# Patient Record
Sex: FEMALE | Race: WHITE | NOT HISPANIC OR LATINO | Employment: UNEMPLOYED | RURAL
[De-identification: names, ages, dates, MRNs, and addresses within clinical notes are randomized per-mention and may not be internally consistent; named-entity substitution may affect disease eponyms.]

---

## 2017-02-16 ENCOUNTER — HISTORICAL (OUTPATIENT)
Dept: ADMINISTRATIVE | Facility: HOSPITAL | Age: 52
End: 2017-02-16

## 2017-02-21 LAB
LAB AP CLINICAL INFORMATION: NORMAL
LAB AP GENERAL CAT - HISTORICAL: NORMAL
LAB AP INTERPRETATION/RESULT - HISTORICAL: NEGATIVE
LAB AP SPECIMEN ADEQUACY - HISTORICAL: NORMAL
LAB AP SPECIMEN SUBMITTED - HISTORICAL: NORMAL

## 2019-05-16 ENCOUNTER — HISTORICAL (OUTPATIENT)
Dept: ADMINISTRATIVE | Facility: HOSPITAL | Age: 54
End: 2019-05-16

## 2019-05-21 LAB
LAB AP CLINICAL INFORMATION: NORMAL
LAB AP COMMENTS: NORMAL
LAB AP GENERAL CAT - HISTORICAL: NORMAL
LAB AP INTERPRETATION/RESULT - HISTORICAL: NEGATIVE
LAB AP SPECIMEN ADEQUACY - HISTORICAL: NORMAL
LAB AP SPECIMEN SUBMITTED - HISTORICAL: NORMAL

## 2020-10-09 ENCOUNTER — HISTORICAL (OUTPATIENT)
Dept: ADMINISTRATIVE | Facility: HOSPITAL | Age: 55
End: 2020-10-09

## 2020-10-10 LAB
FLUAV AG UPPER RESP QL IA.RAPID: NEGATIVE
FLUBV AG UPPER RESP QL IA.RAPID: NEGATIVE
SARS-COV+SARS-COV-2 AG RESP QL IA.RAPID: NEGATIVE

## 2020-11-30 ENCOUNTER — HISTORICAL (OUTPATIENT)
Dept: ADMINISTRATIVE | Facility: HOSPITAL | Age: 55
End: 2020-11-30

## 2020-12-14 ENCOUNTER — HISTORICAL (OUTPATIENT)
Dept: ADMINISTRATIVE | Facility: HOSPITAL | Age: 55
End: 2020-12-14

## 2020-12-21 ENCOUNTER — HISTORICAL (OUTPATIENT)
Dept: ADMINISTRATIVE | Facility: HOSPITAL | Age: 55
End: 2020-12-21

## 2021-01-06 ENCOUNTER — HISTORICAL (OUTPATIENT)
Dept: ADMINISTRATIVE | Facility: HOSPITAL | Age: 56
End: 2021-01-06

## 2021-01-21 ENCOUNTER — HISTORICAL (OUTPATIENT)
Dept: ADMINISTRATIVE | Facility: HOSPITAL | Age: 56
End: 2021-01-21

## 2021-01-21 LAB — GLUCOSE SERPL-MCNC: 95 MG/DL (ref 70–105)

## 2021-05-12 ENCOUNTER — HISTORICAL (OUTPATIENT)
Dept: ADMINISTRATIVE | Facility: HOSPITAL | Age: 56
End: 2021-05-12

## 2021-09-03 DIAGNOSIS — Z79.4 TYPE 2 DIABETES MELLITUS WITHOUT COMPLICATION, WITH LONG-TERM CURRENT USE OF INSULIN: ICD-10-CM

## 2021-09-03 DIAGNOSIS — E78.5 HYPERLIPIDEMIA, UNSPECIFIED HYPERLIPIDEMIA TYPE: Primary | ICD-10-CM

## 2021-09-03 DIAGNOSIS — E11.9 TYPE 2 DIABETES MELLITUS WITHOUT COMPLICATION, WITH LONG-TERM CURRENT USE OF INSULIN: ICD-10-CM

## 2021-09-03 RX ORDER — METHOCARBAMOL 500 MG/1
500 TABLET, FILM COATED ORAL 3 TIMES DAILY PRN
COMMUNITY
Start: 2021-06-16 | End: 2023-01-27 | Stop reason: SDUPTHER

## 2021-09-03 RX ORDER — ATORVASTATIN CALCIUM 20 MG/1
20 TABLET, FILM COATED ORAL DAILY
COMMUNITY
Start: 2021-05-30 | End: 2021-09-03 | Stop reason: SDUPTHER

## 2021-09-03 RX ORDER — MELOXICAM 7.5 MG/1
7.5 TABLET ORAL DAILY
COMMUNITY
Start: 2021-07-28 | End: 2022-07-28

## 2021-09-03 RX ORDER — CITALOPRAM 40 MG/1
40 TABLET, FILM COATED ORAL DAILY
COMMUNITY
Start: 2021-08-13 | End: 2021-09-26 | Stop reason: SDUPTHER

## 2021-09-03 RX ORDER — METFORMIN HYDROCHLORIDE 1000 MG/1
1000 TABLET ORAL 2 TIMES DAILY
COMMUNITY
Start: 2021-07-11 | End: 2021-09-03 | Stop reason: SDUPTHER

## 2021-09-03 RX ORDER — GABAPENTIN 300 MG/1
CAPSULE ORAL
COMMUNITY
Start: 2021-08-13 | End: 2022-01-28

## 2021-09-03 RX ORDER — LEVOTHYROXINE SODIUM 112 UG/1
112 TABLET ORAL DAILY
COMMUNITY
Start: 2021-08-23 | End: 2022-01-28 | Stop reason: SDUPTHER

## 2021-09-06 RX ORDER — METFORMIN HYDROCHLORIDE 1000 MG/1
1000 TABLET ORAL 2 TIMES DAILY
Qty: 60 TABLET | Refills: 0 | Status: SHIPPED | OUTPATIENT
Start: 2021-09-06 | End: 2021-11-04 | Stop reason: SDUPTHER

## 2021-09-06 RX ORDER — ATORVASTATIN CALCIUM 20 MG/1
20 TABLET, FILM COATED ORAL DAILY
Qty: 30 TABLET | Refills: 1 | Status: SHIPPED | OUTPATIENT
Start: 2021-09-06 | End: 2021-11-04 | Stop reason: SDUPTHER

## 2021-11-04 ENCOUNTER — PATIENT MESSAGE (OUTPATIENT)
Dept: FAMILY MEDICINE | Facility: CLINIC | Age: 56
End: 2021-11-04

## 2021-11-04 DIAGNOSIS — Z79.4 TYPE 2 DIABETES MELLITUS WITHOUT COMPLICATION, WITH LONG-TERM CURRENT USE OF INSULIN: ICD-10-CM

## 2021-11-04 DIAGNOSIS — E11.9 TYPE 2 DIABETES MELLITUS WITHOUT COMPLICATION, WITH LONG-TERM CURRENT USE OF INSULIN: ICD-10-CM

## 2021-11-04 DIAGNOSIS — E78.5 HYPERLIPIDEMIA, UNSPECIFIED HYPERLIPIDEMIA TYPE: ICD-10-CM

## 2021-11-04 RX ORDER — METFORMIN HYDROCHLORIDE 1000 MG/1
1000 TABLET ORAL 2 TIMES DAILY
Qty: 60 TABLET | Refills: 1 | Status: SHIPPED | OUTPATIENT
Start: 2021-11-04 | End: 2022-01-28 | Stop reason: SDUPTHER

## 2021-11-04 RX ORDER — ATORVASTATIN CALCIUM 20 MG/1
20 TABLET, FILM COATED ORAL DAILY
Qty: 30 TABLET | Refills: 1 | Status: SHIPPED | OUTPATIENT
Start: 2021-11-04 | End: 2022-01-28 | Stop reason: SDUPTHER

## 2021-12-02 DIAGNOSIS — Z12.31 ENCOUNTER FOR SCREENING MAMMOGRAM FOR MALIGNANT NEOPLASM OF BREAST: Primary | ICD-10-CM

## 2022-01-28 ENCOUNTER — OFFICE VISIT (OUTPATIENT)
Dept: FAMILY MEDICINE | Facility: CLINIC | Age: 57
End: 2022-01-28
Payer: COMMERCIAL

## 2022-01-28 VITALS
BODY MASS INDEX: 28.19 KG/M2 | RESPIRATION RATE: 18 BRPM | OXYGEN SATURATION: 98 % | HEIGHT: 68 IN | HEART RATE: 78 BPM | WEIGHT: 186 LBS | DIASTOLIC BLOOD PRESSURE: 75 MMHG | SYSTOLIC BLOOD PRESSURE: 115 MMHG | TEMPERATURE: 98 F

## 2022-01-28 DIAGNOSIS — E03.9 ACQUIRED HYPOTHYROIDISM: ICD-10-CM

## 2022-01-28 DIAGNOSIS — Z79.4 TYPE 2 DIABETES MELLITUS WITHOUT COMPLICATION, WITH LONG-TERM CURRENT USE OF INSULIN: ICD-10-CM

## 2022-01-28 DIAGNOSIS — E11.9 TYPE 2 DIABETES MELLITUS WITHOUT COMPLICATION, WITH LONG-TERM CURRENT USE OF INSULIN: ICD-10-CM

## 2022-01-28 DIAGNOSIS — F32.A DEPRESSION, UNSPECIFIED DEPRESSION TYPE: Primary | ICD-10-CM

## 2022-01-28 DIAGNOSIS — E78.5 HYPERLIPIDEMIA, UNSPECIFIED HYPERLIPIDEMIA TYPE: ICD-10-CM

## 2022-01-28 LAB
ALBUMIN SERPL BCP-MCNC: 4.1 G/DL (ref 3.5–5)
ALBUMIN/GLOB SERPL: 1.1 {RATIO}
ALP SERPL-CCNC: 111 U/L (ref 46–118)
ALT SERPL W P-5'-P-CCNC: 23 U/L (ref 13–56)
ANION GAP SERPL CALCULATED.3IONS-SCNC: 9 MMOL/L (ref 7–16)
AST SERPL W P-5'-P-CCNC: 12 U/L (ref 15–37)
BASOPHILS # BLD AUTO: 0.08 K/UL (ref 0–0.2)
BASOPHILS NFR BLD AUTO: 0.9 % (ref 0–1)
BILIRUB SERPL-MCNC: 0.4 MG/DL (ref 0–1.2)
BUN SERPL-MCNC: 20 MG/DL (ref 7–18)
BUN/CREAT SERPL: 25 (ref 6–20)
CALCIUM SERPL-MCNC: 9 MG/DL (ref 8.5–10.1)
CHLORIDE SERPL-SCNC: 107 MMOL/L (ref 98–107)
CHOLEST SERPL-MCNC: 216 MG/DL (ref 0–200)
CHOLEST/HDLC SERPL: 5 {RATIO}
CO2 SERPL-SCNC: 26 MMOL/L (ref 21–32)
CREAT SERPL-MCNC: 0.79 MG/DL (ref 0.55–1.02)
CREAT UR-MCNC: 73 MG/DL (ref 28–219)
DIFFERENTIAL METHOD BLD: ABNORMAL
EOSINOPHIL # BLD AUTO: 0.46 K/UL (ref 0–0.5)
EOSINOPHIL NFR BLD AUTO: 4.9 % (ref 1–4)
ERYTHROCYTE [DISTWIDTH] IN BLOOD BY AUTOMATED COUNT: 12.4 % (ref 11.5–14.5)
EST. AVERAGE GLUCOSE BLD GHB EST-MCNC: 90 MG/DL
GLOBULIN SER-MCNC: 3.8 G/DL (ref 2–4)
GLUCOSE SERPL-MCNC: 84 MG/DL (ref 74–106)
HBA1C MFR BLD HPLC: 5.3 % (ref 4.5–6.6)
HCT VFR BLD AUTO: 41.5 % (ref 38–47)
HDLC SERPL-MCNC: 43 MG/DL (ref 40–60)
HGB BLD-MCNC: 14.1 G/DL (ref 12–16)
IMM GRANULOCYTES # BLD AUTO: 0.07 K/UL (ref 0–0.04)
IMM GRANULOCYTES NFR BLD: 0.7 % (ref 0–0.4)
LDLC SERPL CALC-MCNC: 105 MG/DL
LDLC/HDLC SERPL: 2.4 {RATIO}
LYMPHOCYTES # BLD AUTO: 2.87 K/UL (ref 1–4.8)
LYMPHOCYTES NFR BLD AUTO: 30.6 % (ref 27–41)
MCH RBC QN AUTO: 29.7 PG (ref 27–31)
MCHC RBC AUTO-ENTMCNC: 34 G/DL (ref 32–36)
MCV RBC AUTO: 87.6 FL (ref 80–96)
MICROALBUMIN UR-MCNC: <0.5 MG/DL (ref 0–2.8)
MICROALBUMIN/CREAT RATIO PNL UR: <6.8 MG/G (ref 0–30)
MONOCYTES # BLD AUTO: 0.53 K/UL (ref 0–0.8)
MONOCYTES NFR BLD AUTO: 5.7 % (ref 2–6)
MPC BLD CALC-MCNC: 10.5 FL (ref 9.4–12.4)
NEUTROPHILS # BLD AUTO: 5.37 K/UL (ref 1.8–7.7)
NEUTROPHILS NFR BLD AUTO: 57.2 % (ref 53–65)
NONHDLC SERPL-MCNC: 173 MG/DL
NRBC # BLD AUTO: 0 X10E3/UL
NRBC, AUTO (.00): 0 %
PLATELET # BLD AUTO: 300 K/UL (ref 150–400)
POTASSIUM SERPL-SCNC: 4.2 MMOL/L (ref 3.5–5.1)
PROT SERPL-MCNC: 7.9 G/DL (ref 6.4–8.2)
RBC # BLD AUTO: 4.74 M/UL (ref 4.2–5.4)
SODIUM SERPL-SCNC: 138 MMOL/L (ref 136–145)
TRIGL SERPL-MCNC: 339 MG/DL (ref 35–150)
TSH SERPL DL<=0.005 MIU/L-ACNC: 1.99 UIU/ML (ref 0.36–3.74)
VLDLC SERPL-MCNC: 68 MG/DL
WBC # BLD AUTO: 9.38 K/UL (ref 4.5–11)

## 2022-01-28 PROCEDURE — 80053 COMPREHEN METABOLIC PANEL: CPT | Mod: ,,, | Performed by: CLINICAL MEDICAL LABORATORY

## 2022-01-28 PROCEDURE — 82570 ASSAY OF URINE CREATININE: CPT | Mod: ,,, | Performed by: CLINICAL MEDICAL LABORATORY

## 2022-01-28 PROCEDURE — 84443 ASSAY THYROID STIM HORMONE: CPT | Mod: ,,, | Performed by: CLINICAL MEDICAL LABORATORY

## 2022-01-28 PROCEDURE — 83036 HEMOGLOBIN GLYCOSYLATED A1C: CPT | Mod: ,,, | Performed by: CLINICAL MEDICAL LABORATORY

## 2022-01-28 PROCEDURE — 80053 COMPREHENSIVE METABOLIC PANEL: ICD-10-PCS | Mod: ,,, | Performed by: CLINICAL MEDICAL LABORATORY

## 2022-01-28 PROCEDURE — 82570 MICROALBUMIN / CREATININE RATIO URINE: ICD-10-PCS | Mod: ,,, | Performed by: CLINICAL MEDICAL LABORATORY

## 2022-01-28 PROCEDURE — 85025 CBC WITH DIFFERENTIAL: ICD-10-PCS | Mod: ,,, | Performed by: CLINICAL MEDICAL LABORATORY

## 2022-01-28 PROCEDURE — 99214 OFFICE O/P EST MOD 30 MIN: CPT | Mod: ,,, | Performed by: FAMILY MEDICINE

## 2022-01-28 PROCEDURE — 82043 MICROALBUMIN / CREATININE RATIO URINE: ICD-10-PCS | Mod: ,,, | Performed by: CLINICAL MEDICAL LABORATORY

## 2022-01-28 PROCEDURE — 84443 TSH: ICD-10-PCS | Mod: ,,, | Performed by: CLINICAL MEDICAL LABORATORY

## 2022-01-28 PROCEDURE — 80061 LIPID PANEL: ICD-10-PCS | Mod: ,,, | Performed by: CLINICAL MEDICAL LABORATORY

## 2022-01-28 PROCEDURE — 82043 UR ALBUMIN QUANTITATIVE: CPT | Mod: ,,, | Performed by: CLINICAL MEDICAL LABORATORY

## 2022-01-28 PROCEDURE — 99214 PR OFFICE/OUTPT VISIT, EST, LEVL IV, 30-39 MIN: ICD-10-PCS | Mod: ,,, | Performed by: FAMILY MEDICINE

## 2022-01-28 PROCEDURE — 83036 HEMOGLOBIN A1C: ICD-10-PCS | Mod: ,,, | Performed by: CLINICAL MEDICAL LABORATORY

## 2022-01-28 PROCEDURE — 85025 COMPLETE CBC W/AUTO DIFF WBC: CPT | Mod: ,,, | Performed by: CLINICAL MEDICAL LABORATORY

## 2022-01-28 PROCEDURE — 80061 LIPID PANEL: CPT | Mod: ,,, | Performed by: CLINICAL MEDICAL LABORATORY

## 2022-01-28 RX ORDER — METFORMIN HYDROCHLORIDE 1000 MG/1
1000 TABLET ORAL 2 TIMES DAILY
Qty: 180 TABLET | Refills: 1 | Status: SHIPPED | OUTPATIENT
Start: 2022-01-28 | End: 2023-01-27 | Stop reason: SDUPTHER

## 2022-01-28 RX ORDER — GABAPENTIN 300 MG/1
300 CAPSULE ORAL 2 TIMES DAILY
COMMUNITY
End: 2023-01-27 | Stop reason: SDUPTHER

## 2022-01-28 RX ORDER — CITALOPRAM 40 MG/1
40 TABLET, FILM COATED ORAL DAILY
Qty: 90 TABLET | Refills: 1 | Status: SHIPPED | OUTPATIENT
Start: 2022-01-28 | End: 2022-07-28 | Stop reason: SDUPTHER

## 2022-01-28 RX ORDER — LEVOTHYROXINE SODIUM 112 UG/1
112 TABLET ORAL DAILY
Qty: 90 TABLET | Refills: 1 | Status: SHIPPED | OUTPATIENT
Start: 2022-01-28 | End: 2022-07-28 | Stop reason: SDUPTHER

## 2022-01-28 RX ORDER — ATORVASTATIN CALCIUM 20 MG/1
20 TABLET, FILM COATED ORAL DAILY
Qty: 90 TABLET | Refills: 1 | Status: SHIPPED | OUTPATIENT
Start: 2022-01-28 | End: 2022-02-02 | Stop reason: CLARIF

## 2022-01-28 NOTE — PROGRESS NOTES
"New Clinic Note    Ene Clemons is a 56 y.o. female     CC:   Chief Complaint   Patient presents with    Annual Exam    Diabetes    Hyperlipidemia    Anxiety     Stated she takes Celexa for her "Hot flashes" and needs renewal on all her prescriptions sent to [facility]. Patient is not fasting this am for labs.     Medication Refill        Subjective    History of Present Illness HPI   She is here for evaluation of chronic medical problems. She needs refills.     Current Outpatient Medications:     gabapentin (NEURONTIN) 300 MG capsule, Take 300 mg by mouth 2 (two) times a day., Disp: , Rfl:     meloxicam (MOBIC) 7.5 MG tablet, Take 7.5 mg by mouth once daily. For 30 days, Disp: , Rfl:     methocarbamoL (ROBAXIN) 500 MG Tab, Take 500 mg by mouth 3 (three) times daily as needed., Disp: , Rfl:     atorvastatin (LIPITOR) 20 MG tablet, Take 1 tablet (20 mg total) by mouth once daily., Disp: 90 tablet, Rfl: 1    citalopram (CELEXA) 40 MG tablet, Take 1 tablet (40 mg total) by mouth once daily., Disp: 90 tablet, Rfl: 1    EUTHYROX 112 mcg tablet, Take 1 tablet (112 mcg total) by mouth once daily., Disp: 90 tablet, Rfl: 1    metFORMIN (GLUCOPHAGE) 1000 MG tablet, Take 1 tablet (1,000 mg total) by mouth 2 (two) times daily., Disp: 180 tablet, Rfl: 1     Past Medical History:   Diagnosis Date    Arthritis     Depression     Hyperlipidemia     Hypothyroidism     Thyroid disease         Family History   Problem Relation Age of Onset    No Known Problems Mother     Arthritis Father     Heart disease Father     Cancer Father     No Known Problems Sister         Past Surgical History:   Procedure Laterality Date    ADENOIDECTOMY      CHOLECYSTECTOMY      HYSTERECTOMY      TONSILLECTOMY          Review of Systems   Constitutional: Negative for fatigue and fever.   HENT: Negative for ear pain, postnasal drip, rhinorrhea and sinus pressure/congestion.    Respiratory: Negative for cough and shortness of " "breath.    Cardiovascular: Negative for chest pain.   Gastrointestinal: Negative for abdominal pain, diarrhea, nausea and vomiting.   Genitourinary: Negative for dysuria.   Neurological: Negative for headaches.        /75 (BP Location: Left arm, Patient Position: Sitting, BP Method: Large (Automatic))   Pulse 78   Temp 98.2 °F (36.8 °C) (Oral)   Resp 18   Ht 5' 8" (1.727 m)   Wt 84.4 kg (186 lb)   SpO2 98%   BMI 28.28 kg/m²      Physical Exam  HENT:      Head: Normocephalic and atraumatic.   Cardiovascular:      Rate and Rhythm: Normal rate and regular rhythm.   Pulmonary:      Effort: Pulmonary effort is normal.      Breath sounds: Normal breath sounds.   Neurological:      Mental Status: She is alert and oriented to person, place, and time.   Psychiatric:         Mood and Affect: Mood normal.         Behavior: Behavior normal.          Assessment and Plan      ICD-10-CM ICD-9-CM   1. Depression, unspecified depression type  F32.A 311   2. Hyperlipidemia, unspecified hyperlipidemia type  E78.5 272.4   3. Type 2 diabetes mellitus without complication, with long-term current use of insulin  E11.9 250.00    Z79.4 V58.67   4. Acquired hypothyroidism  E03.9 244.9        Problem List Items Addressed This Visit    None     Visit Diagnoses     Depression, unspecified depression type    -  Primary    Relevant Medications    citalopram (CELEXA) 40 MG tablet    Hyperlipidemia, unspecified hyperlipidemia type        Relevant Medications    atorvastatin (LIPITOR) 20 MG tablet    Type 2 diabetes mellitus without complication, with long-term current use of insulin        Relevant Medications    metFORMIN (GLUCOPHAGE) 1000 MG tablet    Other Relevant Orders    Comprehensive Metabolic Panel    CBC Auto Differential    Lipid Panel    Hemoglobin A1C    Microalbumin/Creatinine Ratio, Urine    Acquired hypothyroidism        Relevant Medications    EUTHYROX 112 mcg tablet    Other Relevant Orders    TSH           Patient " Instructions   1. Check glucose outside of clinic, records numbers, and bring to follow up visit.   2. Take medications as directed.   3. Eat a diabetic diet  4. Cardiovascular exercise at least 3 times per week.          Follow up in about 6 months (around 7/28/2022).

## 2022-02-02 DIAGNOSIS — E78.5 HYPERLIPIDEMIA, UNSPECIFIED HYPERLIPIDEMIA TYPE: Primary | ICD-10-CM

## 2022-02-02 RX ORDER — SIMVASTATIN 10 MG/1
10 TABLET, FILM COATED ORAL NIGHTLY
Qty: 90 TABLET | Refills: 1 | Status: SHIPPED | OUTPATIENT
Start: 2022-02-02 | End: 2022-07-28 | Stop reason: SDUPTHER

## 2022-02-16 RX ORDER — CITALOPRAM 40 MG/1
TABLET, FILM COATED ORAL
Qty: 30 TABLET | Refills: 0 | OUTPATIENT
Start: 2022-02-16

## 2022-07-28 ENCOUNTER — OFFICE VISIT (OUTPATIENT)
Dept: FAMILY MEDICINE | Facility: CLINIC | Age: 57
End: 2022-07-28
Payer: COMMERCIAL

## 2022-07-28 VITALS
DIASTOLIC BLOOD PRESSURE: 82 MMHG | SYSTOLIC BLOOD PRESSURE: 123 MMHG | OXYGEN SATURATION: 99 % | RESPIRATION RATE: 18 BRPM | WEIGHT: 194 LBS | BODY MASS INDEX: 29.5 KG/M2 | HEART RATE: 70 BPM | TEMPERATURE: 98 F

## 2022-07-28 DIAGNOSIS — E03.9 ACQUIRED HYPOTHYROIDISM: ICD-10-CM

## 2022-07-28 DIAGNOSIS — Z79.4 TYPE 2 DIABETES MELLITUS WITHOUT COMPLICATION, WITH LONG-TERM CURRENT USE OF INSULIN: Primary | Chronic | ICD-10-CM

## 2022-07-28 DIAGNOSIS — E78.5 HYPERLIPIDEMIA, UNSPECIFIED HYPERLIPIDEMIA TYPE: ICD-10-CM

## 2022-07-28 DIAGNOSIS — E11.9 TYPE 2 DIABETES MELLITUS WITHOUT COMPLICATION, WITH LONG-TERM CURRENT USE OF INSULIN: Primary | Chronic | ICD-10-CM

## 2022-07-28 DIAGNOSIS — F32.A DEPRESSION, UNSPECIFIED DEPRESSION TYPE: ICD-10-CM

## 2022-07-28 LAB
EST. AVERAGE GLUCOSE BLD GHB EST-MCNC: 104 MG/DL
HBA1C MFR BLD HPLC: 5.7 % (ref 4.5–6.6)

## 2022-07-28 PROCEDURE — 3044F PR MOST RECENT HEMOGLOBIN A1C LEVEL <7.0%: ICD-10-PCS | Mod: CPTII,,, | Performed by: FAMILY MEDICINE

## 2022-07-28 PROCEDURE — 3066F PR DOCUMENTATION OF TREATMENT FOR NEPHROPATHY: ICD-10-PCS | Mod: CPTII,,, | Performed by: FAMILY MEDICINE

## 2022-07-28 PROCEDURE — 99214 OFFICE O/P EST MOD 30 MIN: CPT | Mod: ,,, | Performed by: FAMILY MEDICINE

## 2022-07-28 PROCEDURE — 3079F DIAST BP 80-89 MM HG: CPT | Mod: CPTII,,, | Performed by: FAMILY MEDICINE

## 2022-07-28 PROCEDURE — 3008F BODY MASS INDEX DOCD: CPT | Mod: CPTII,,, | Performed by: FAMILY MEDICINE

## 2022-07-28 PROCEDURE — 1160F RVW MEDS BY RX/DR IN RCRD: CPT | Mod: CPTII,,, | Performed by: FAMILY MEDICINE

## 2022-07-28 PROCEDURE — 3061F NEG MICROALBUMINURIA REV: CPT | Mod: CPTII,,, | Performed by: FAMILY MEDICINE

## 2022-07-28 PROCEDURE — 3044F HG A1C LEVEL LT 7.0%: CPT | Mod: CPTII,,, | Performed by: FAMILY MEDICINE

## 2022-07-28 PROCEDURE — 83036 HEMOGLOBIN GLYCOSYLATED A1C: CPT | Mod: ,,, | Performed by: CLINICAL MEDICAL LABORATORY

## 2022-07-28 PROCEDURE — 1160F PR REVIEW ALL MEDS BY PRESCRIBER/CLIN PHARMACIST DOCUMENTED: ICD-10-PCS | Mod: CPTII,,, | Performed by: FAMILY MEDICINE

## 2022-07-28 PROCEDURE — 3061F PR NEG MICROALBUMINURIA RESULT DOCUMENTED/REVIEW: ICD-10-PCS | Mod: CPTII,,, | Performed by: FAMILY MEDICINE

## 2022-07-28 PROCEDURE — 3079F PR MOST RECENT DIASTOLIC BLOOD PRESSURE 80-89 MM HG: ICD-10-PCS | Mod: CPTII,,, | Performed by: FAMILY MEDICINE

## 2022-07-28 PROCEDURE — 3074F PR MOST RECENT SYSTOLIC BLOOD PRESSURE < 130 MM HG: ICD-10-PCS | Mod: CPTII,,, | Performed by: FAMILY MEDICINE

## 2022-07-28 PROCEDURE — 3008F PR BODY MASS INDEX (BMI) DOCUMENTED: ICD-10-PCS | Mod: CPTII,,, | Performed by: FAMILY MEDICINE

## 2022-07-28 PROCEDURE — 3074F SYST BP LT 130 MM HG: CPT | Mod: CPTII,,, | Performed by: FAMILY MEDICINE

## 2022-07-28 PROCEDURE — 1159F PR MEDICATION LIST DOCUMENTED IN MEDICAL RECORD: ICD-10-PCS | Mod: CPTII,,, | Performed by: FAMILY MEDICINE

## 2022-07-28 PROCEDURE — 3066F NEPHROPATHY DOC TX: CPT | Mod: CPTII,,, | Performed by: FAMILY MEDICINE

## 2022-07-28 PROCEDURE — 1159F MED LIST DOCD IN RCRD: CPT | Mod: CPTII,,, | Performed by: FAMILY MEDICINE

## 2022-07-28 PROCEDURE — 99214 PR OFFICE/OUTPT VISIT, EST, LEVL IV, 30-39 MIN: ICD-10-PCS | Mod: ,,, | Performed by: FAMILY MEDICINE

## 2022-07-28 PROCEDURE — 83036 HEMOGLOBIN A1C: ICD-10-PCS | Mod: ,,, | Performed by: CLINICAL MEDICAL LABORATORY

## 2022-07-28 RX ORDER — CITALOPRAM 40 MG/1
40 TABLET, FILM COATED ORAL DAILY
Qty: 90 TABLET | Refills: 1 | Status: SHIPPED | OUTPATIENT
Start: 2022-07-28 | End: 2023-01-27 | Stop reason: SDUPTHER

## 2022-07-28 RX ORDER — SIMVASTATIN 10 MG/1
10 TABLET, FILM COATED ORAL NIGHTLY
Qty: 90 TABLET | Refills: 1 | Status: SHIPPED | OUTPATIENT
Start: 2022-07-28 | End: 2023-01-27 | Stop reason: SDUPTHER

## 2022-07-28 RX ORDER — LEVOTHYROXINE SODIUM 112 UG/1
112 TABLET ORAL DAILY
Qty: 90 TABLET | Refills: 1 | Status: SHIPPED | OUTPATIENT
Start: 2022-07-28 | End: 2023-01-27 | Stop reason: SDUPTHER

## 2022-07-28 RX ORDER — HYDROCODONE BITARTRATE AND ACETAMINOPHEN 7.5; 325 MG/1; MG/1
1 TABLET ORAL 2 TIMES DAILY PRN
COMMUNITY
Start: 2022-06-13

## 2022-07-28 RX ORDER — CELECOXIB 200 MG/1
200 CAPSULE ORAL DAILY
COMMUNITY
Start: 2022-05-26 | End: 2023-01-27 | Stop reason: SDUPTHER

## 2022-07-28 NOTE — PROGRESS NOTES
New Clinic Note    Ene Clemons is a 56 y.o. female     CC:   Chief Complaint   Patient presents with    Follow-up     Pt is here for 6 mth folow up and medication refill        Subjective    History of Present Illness HPI   Patient is here for evaluation of chronic medical problems.  She needs refills.  She is tolerating her medications.    Current Outpatient Medications:     celecoxib (CELEBREX) 200 MG capsule, Take 200 mg by mouth once daily., Disp: , Rfl:     gabapentin (NEURONTIN) 300 MG capsule, Take 300 mg by mouth 2 (two) times a day., Disp: , Rfl:     HYDROcodone-acetaminophen (NORCO) 7.5-325 mg per tablet, Take 1 tablet by mouth 2 (two) times daily as needed., Disp: , Rfl:     metFORMIN (GLUCOPHAGE) 1000 MG tablet, Take 1 tablet (1,000 mg total) by mouth 2 (two) times daily., Disp: 180 tablet, Rfl: 1    methocarbamoL (ROBAXIN) 500 MG Tab, Take 500 mg by mouth 3 (three) times daily as needed., Disp: , Rfl:     citalopram (CELEXA) 40 MG tablet, Take 1 tablet (40 mg total) by mouth once daily., Disp: 90 tablet, Rfl: 1    EUTHYROX 112 mcg tablet, Take 1 tablet (112 mcg total) by mouth once daily., Disp: 90 tablet, Rfl: 1    simvastatin (ZOCOR) 10 MG tablet, Take 1 tablet (10 mg total) by mouth every evening., Disp: 90 tablet, Rfl: 1     Past Medical History:   Diagnosis Date    Arthritis     Depression     Hyperlipidemia     Hypothyroidism     Thyroid disease         Family History   Problem Relation Age of Onset    No Known Problems Mother     Arthritis Father     Heart disease Father     Cancer Father     No Known Problems Sister         Past Surgical History:   Procedure Laterality Date    ADENOIDECTOMY      CHOLECYSTECTOMY      HYSTERECTOMY      TONSILLECTOMY          Review of Systems   Constitutional: Negative for fatigue and fever.   HENT: Negative for ear pain, postnasal drip, rhinorrhea and sinus pressure/congestion.    Respiratory: Negative for cough and shortness of breath.     Cardiovascular: Negative for chest pain.   Gastrointestinal: Negative for abdominal pain, diarrhea, nausea and vomiting.   Genitourinary: Negative for dysuria.   Neurological: Negative for headaches.        /82 (BP Location: Left arm, Patient Position: Sitting, BP Method: Large (Automatic))   Pulse 70   Temp 98 °F (36.7 °C)   Resp 18   Wt 88 kg (194 lb)   SpO2 99%   BMI 29.50 kg/m²      Physical Exam  HENT:      Head: Normocephalic and atraumatic.   Cardiovascular:      Rate and Rhythm: Normal rate and regular rhythm.   Pulmonary:      Effort: Pulmonary effort is normal.      Breath sounds: Normal breath sounds.   Neurological:      Mental Status: She is alert and oriented to person, place, and time.   Psychiatric:         Mood and Affect: Mood normal.         Behavior: Behavior normal.          Assessment and Plan      ICD-10-CM ICD-9-CM   1. Type 2 diabetes mellitus without complication, with long-term current use of insulin  E11.9 250.00    Z79.4 V58.67   2. Hyperlipidemia, unspecified hyperlipidemia type  E78.5 272.4   3. Acquired hypothyroidism  E03.9 244.9   4. Depression, unspecified depression type  F32.A 311        Problem List Items Addressed This Visit        Psychiatric    Depression (Chronic)    Relevant Medications    citalopram (CELEXA) 40 MG tablet       Cardiac/Vascular    Hyperlipidemia (Chronic)    Relevant Medications    simvastatin (ZOCOR) 10 MG tablet       Endocrine    Type 2 diabetes mellitus without complication, with long-term current use of insulin - Primary (Chronic)    Relevant Orders    Hemoglobin A1C    Acquired hypothyroidism (Chronic)    Relevant Medications    EUTHYROX 112 mcg tablet           Patient Instructions   1. Check glucose outside of clinic, records numbers, and bring to follow up visit.   2. Take medications as directed.   3. Eat a diabetic diet  4. Cardiovascular exercise at least 3 times per week.         Follow up in about 6 months (around 1/28/2023).

## 2022-08-08 ENCOUNTER — HOSPITAL ENCOUNTER (OUTPATIENT)
Dept: RADIOLOGY | Facility: HOSPITAL | Age: 57
Discharge: HOME | End: 2022-08-08
Attending: FAMILY MEDICINE
Payer: COMMERCIAL

## 2022-08-08 ENCOUNTER — OFFICE VISIT (OUTPATIENT)
Dept: FAMILY MEDICINE | Facility: CLINIC | Age: 57
End: 2022-08-08
Payer: COMMERCIAL

## 2022-08-08 VITALS
TEMPERATURE: 99 F | DIASTOLIC BLOOD PRESSURE: 85 MMHG | RESPIRATION RATE: 18 BRPM | OXYGEN SATURATION: 95 % | WEIGHT: 192 LBS | HEIGHT: 68 IN | SYSTOLIC BLOOD PRESSURE: 131 MMHG | HEART RATE: 88 BPM | BODY MASS INDEX: 29.1 KG/M2

## 2022-08-08 DIAGNOSIS — J40 BRONCHITIS: Primary | ICD-10-CM

## 2022-08-08 DIAGNOSIS — R05.9 COUGH: ICD-10-CM

## 2022-08-08 DIAGNOSIS — Z20.822 COUGH WITH EXPOSURE TO COVID-19 VIRUS: ICD-10-CM

## 2022-08-08 DIAGNOSIS — R05.8 COUGH WITH EXPOSURE TO COVID-19 VIRUS: ICD-10-CM

## 2022-08-08 LAB
CTP QC/QA: YES
FLUAV AG NPH QL: NEGATIVE
FLUBV AG NPH QL: NEGATIVE
SARS-COV-2 AG RESP QL IA.RAPID: NEGATIVE

## 2022-08-08 PROCEDURE — 3066F PR DOCUMENTATION OF TREATMENT FOR NEPHROPATHY: ICD-10-PCS | Mod: CPTII,,, | Performed by: FAMILY MEDICINE

## 2022-08-08 PROCEDURE — 3008F BODY MASS INDEX DOCD: CPT | Mod: CPTII,,, | Performed by: FAMILY MEDICINE

## 2022-08-08 PROCEDURE — 71046 X-RAY EXAM CHEST 2 VIEWS: CPT | Mod: TC

## 2022-08-08 PROCEDURE — 87428 POCT SARS-COV2 (COVID) WITH FLU ANTIGEN: ICD-10-PCS | Mod: QW,,, | Performed by: FAMILY MEDICINE

## 2022-08-08 PROCEDURE — 3079F DIAST BP 80-89 MM HG: CPT | Mod: CPTII,,, | Performed by: FAMILY MEDICINE

## 2022-08-08 PROCEDURE — 3061F PR NEG MICROALBUMINURIA RESULT DOCUMENTED/REVIEW: ICD-10-PCS | Mod: CPTII,,, | Performed by: FAMILY MEDICINE

## 2022-08-08 PROCEDURE — 1160F PR REVIEW ALL MEDS BY PRESCRIBER/CLIN PHARMACIST DOCUMENTED: ICD-10-PCS | Mod: CPTII,,, | Performed by: FAMILY MEDICINE

## 2022-08-08 PROCEDURE — 3044F HG A1C LEVEL LT 7.0%: CPT | Mod: CPTII,,, | Performed by: FAMILY MEDICINE

## 2022-08-08 PROCEDURE — 99213 PR OFFICE/OUTPT VISIT, EST, LEVL III, 20-29 MIN: ICD-10-PCS | Mod: 25,,, | Performed by: FAMILY MEDICINE

## 2022-08-08 PROCEDURE — 3066F NEPHROPATHY DOC TX: CPT | Mod: CPTII,,, | Performed by: FAMILY MEDICINE

## 2022-08-08 PROCEDURE — 1159F MED LIST DOCD IN RCRD: CPT | Mod: CPTII,,, | Performed by: FAMILY MEDICINE

## 2022-08-08 PROCEDURE — 1159F PR MEDICATION LIST DOCUMENTED IN MEDICAL RECORD: ICD-10-PCS | Mod: CPTII,,, | Performed by: FAMILY MEDICINE

## 2022-08-08 PROCEDURE — 3075F PR MOST RECENT SYSTOLIC BLOOD PRESS GE 130-139MM HG: ICD-10-PCS | Mod: CPTII,,, | Performed by: FAMILY MEDICINE

## 2022-08-08 PROCEDURE — 3075F SYST BP GE 130 - 139MM HG: CPT | Mod: CPTII,,, | Performed by: FAMILY MEDICINE

## 2022-08-08 PROCEDURE — 3061F NEG MICROALBUMINURIA REV: CPT | Mod: CPTII,,, | Performed by: FAMILY MEDICINE

## 2022-08-08 PROCEDURE — 3008F PR BODY MASS INDEX (BMI) DOCUMENTED: ICD-10-PCS | Mod: CPTII,,, | Performed by: FAMILY MEDICINE

## 2022-08-08 PROCEDURE — 3044F PR MOST RECENT HEMOGLOBIN A1C LEVEL <7.0%: ICD-10-PCS | Mod: CPTII,,, | Performed by: FAMILY MEDICINE

## 2022-08-08 PROCEDURE — 87428 SARSCOV & INF VIR A&B AG IA: CPT | Mod: QW,,, | Performed by: FAMILY MEDICINE

## 2022-08-08 PROCEDURE — 96372 PR INJECTION,THERAP/PROPH/DIAG2ST, IM OR SUBCUT: ICD-10-PCS | Mod: ,,, | Performed by: FAMILY MEDICINE

## 2022-08-08 PROCEDURE — 99213 OFFICE O/P EST LOW 20 MIN: CPT | Mod: 25,,, | Performed by: FAMILY MEDICINE

## 2022-08-08 PROCEDURE — 3079F PR MOST RECENT DIASTOLIC BLOOD PRESSURE 80-89 MM HG: ICD-10-PCS | Mod: CPTII,,, | Performed by: FAMILY MEDICINE

## 2022-08-08 PROCEDURE — 1160F RVW MEDS BY RX/DR IN RCRD: CPT | Mod: CPTII,,, | Performed by: FAMILY MEDICINE

## 2022-08-08 PROCEDURE — 96372 THER/PROPH/DIAG INJ SC/IM: CPT | Mod: ,,, | Performed by: FAMILY MEDICINE

## 2022-08-08 RX ORDER — AZITHROMYCIN 250 MG/1
TABLET, FILM COATED ORAL
Qty: 6 TABLET | Refills: 0 | Status: SHIPPED | OUTPATIENT
Start: 2022-08-08 | End: 2022-08-17 | Stop reason: ALTCHOICE

## 2022-08-08 RX ORDER — DEXAMETHASONE SODIUM PHOSPHATE 4 MG/ML
4 INJECTION, SOLUTION INTRA-ARTICULAR; INTRALESIONAL; INTRAMUSCULAR; INTRAVENOUS; SOFT TISSUE
Status: COMPLETED | OUTPATIENT
Start: 2022-08-08 | End: 2022-08-08

## 2022-08-08 RX ORDER — METHYLPREDNISOLONE ACETATE 40 MG/ML
40 INJECTION, SUSPENSION INTRA-ARTICULAR; INTRALESIONAL; INTRAMUSCULAR; SOFT TISSUE
Status: COMPLETED | OUTPATIENT
Start: 2022-08-08 | End: 2022-08-08

## 2022-08-08 RX ORDER — ALBUTEROL SULFATE 90 UG/1
2 AEROSOL, METERED RESPIRATORY (INHALATION) EVERY 6 HOURS PRN
Qty: 18 G | Refills: 0 | Status: SHIPPED | OUTPATIENT
Start: 2022-08-08 | End: 2022-11-10

## 2022-08-08 RX ADMIN — DEXAMETHASONE SODIUM PHOSPHATE 4 MG: 4 INJECTION, SOLUTION INTRA-ARTICULAR; INTRALESIONAL; INTRAMUSCULAR; INTRAVENOUS; SOFT TISSUE at 03:08

## 2022-08-08 RX ADMIN — METHYLPREDNISOLONE ACETATE 40 MG: 40 INJECTION, SUSPENSION INTRA-ARTICULAR; INTRALESIONAL; INTRAMUSCULAR; SOFT TISSUE at 03:08

## 2022-08-17 ENCOUNTER — HOSPITAL ENCOUNTER (OUTPATIENT)
Dept: RADIOLOGY | Facility: HOSPITAL | Age: 57
Discharge: HOME | End: 2022-08-17
Attending: FAMILY MEDICINE
Payer: COMMERCIAL

## 2022-08-17 ENCOUNTER — OFFICE VISIT (OUTPATIENT)
Dept: FAMILY MEDICINE | Facility: CLINIC | Age: 57
End: 2022-08-17
Payer: COMMERCIAL

## 2022-08-17 VITALS
WEIGHT: 191 LBS | DIASTOLIC BLOOD PRESSURE: 76 MMHG | SYSTOLIC BLOOD PRESSURE: 129 MMHG | BODY MASS INDEX: 28.95 KG/M2 | OXYGEN SATURATION: 96 % | TEMPERATURE: 99 F | RESPIRATION RATE: 18 BRPM | HEART RATE: 71 BPM | HEIGHT: 68 IN

## 2022-08-17 DIAGNOSIS — R05.8 PRODUCTIVE COUGH: ICD-10-CM

## 2022-08-17 DIAGNOSIS — R07.9 CHEST PAIN, UNSPECIFIED TYPE: ICD-10-CM

## 2022-08-17 DIAGNOSIS — J40 BRONCHITIS: Primary | ICD-10-CM

## 2022-08-17 PROCEDURE — 3044F PR MOST RECENT HEMOGLOBIN A1C LEVEL <7.0%: ICD-10-PCS | Mod: CPTII,,, | Performed by: FAMILY MEDICINE

## 2022-08-17 PROCEDURE — 3008F PR BODY MASS INDEX (BMI) DOCUMENTED: ICD-10-PCS | Mod: CPTII,,, | Performed by: FAMILY MEDICINE

## 2022-08-17 PROCEDURE — 3061F PR NEG MICROALBUMINURIA RESULT DOCUMENTED/REVIEW: ICD-10-PCS | Mod: CPTII,,, | Performed by: FAMILY MEDICINE

## 2022-08-17 PROCEDURE — 1159F MED LIST DOCD IN RCRD: CPT | Mod: CPTII,,, | Performed by: FAMILY MEDICINE

## 2022-08-17 PROCEDURE — 3008F BODY MASS INDEX DOCD: CPT | Mod: CPTII,,, | Performed by: FAMILY MEDICINE

## 2022-08-17 PROCEDURE — 3078F DIAST BP <80 MM HG: CPT | Mod: CPTII,,, | Performed by: FAMILY MEDICINE

## 2022-08-17 PROCEDURE — 99213 PR OFFICE/OUTPT VISIT, EST, LEVL III, 20-29 MIN: ICD-10-PCS | Mod: ,,, | Performed by: FAMILY MEDICINE

## 2022-08-17 PROCEDURE — 71046 X-RAY EXAM CHEST 2 VIEWS: CPT | Mod: TC

## 2022-08-17 PROCEDURE — 1159F PR MEDICATION LIST DOCUMENTED IN MEDICAL RECORD: ICD-10-PCS | Mod: CPTII,,, | Performed by: FAMILY MEDICINE

## 2022-08-17 PROCEDURE — 3061F NEG MICROALBUMINURIA REV: CPT | Mod: CPTII,,, | Performed by: FAMILY MEDICINE

## 2022-08-17 PROCEDURE — 1160F PR REVIEW ALL MEDS BY PRESCRIBER/CLIN PHARMACIST DOCUMENTED: ICD-10-PCS | Mod: CPTII,,, | Performed by: FAMILY MEDICINE

## 2022-08-17 PROCEDURE — 3074F SYST BP LT 130 MM HG: CPT | Mod: CPTII,,, | Performed by: FAMILY MEDICINE

## 2022-08-17 PROCEDURE — 3074F PR MOST RECENT SYSTOLIC BLOOD PRESSURE < 130 MM HG: ICD-10-PCS | Mod: CPTII,,, | Performed by: FAMILY MEDICINE

## 2022-08-17 PROCEDURE — 3044F HG A1C LEVEL LT 7.0%: CPT | Mod: CPTII,,, | Performed by: FAMILY MEDICINE

## 2022-08-17 PROCEDURE — 99213 OFFICE O/P EST LOW 20 MIN: CPT | Mod: ,,, | Performed by: FAMILY MEDICINE

## 2022-08-17 PROCEDURE — 3066F PR DOCUMENTATION OF TREATMENT FOR NEPHROPATHY: ICD-10-PCS | Mod: CPTII,,, | Performed by: FAMILY MEDICINE

## 2022-08-17 PROCEDURE — 3066F NEPHROPATHY DOC TX: CPT | Mod: CPTII,,, | Performed by: FAMILY MEDICINE

## 2022-08-17 PROCEDURE — 1160F RVW MEDS BY RX/DR IN RCRD: CPT | Mod: CPTII,,, | Performed by: FAMILY MEDICINE

## 2022-08-17 PROCEDURE — 3078F PR MOST RECENT DIASTOLIC BLOOD PRESSURE < 80 MM HG: ICD-10-PCS | Mod: CPTII,,, | Performed by: FAMILY MEDICINE

## 2022-08-17 RX ORDER — PREDNISONE 10 MG/1
10 TABLET ORAL DAILY
Qty: 5 TABLET | Refills: 0 | Status: SHIPPED | OUTPATIENT
Start: 2022-08-17 | End: 2022-11-10

## 2022-08-17 RX ORDER — CEFUROXIME AXETIL 500 MG/1
500 TABLET ORAL EVERY 12 HOURS
Qty: 20 TABLET | Refills: 0 | Status: SHIPPED | OUTPATIENT
Start: 2022-08-17 | End: 2022-11-10

## 2022-08-17 RX ORDER — FLUCONAZOLE 150 MG/1
150 TABLET ORAL DAILY
Qty: 2 TABLET | Refills: 0 | Status: SHIPPED | OUTPATIENT
Start: 2022-08-17 | End: 2022-08-18

## 2022-08-17 NOTE — PROGRESS NOTES
New Clinic Note    Ene Clemons is a 56 y.o. female     CC:   Chief Complaint   Patient presents with    Shortness of Breath     Stated she took a steroid shot last visit and  completed a ZPACK and felt better for about three days but her chest issues since then are getting worse and she thinks she may have pneumonia.Stated she never had pneumonia in the past. Productive green cough. Stated after ZPACK it was thick and clear and now she is back to green. Hurts in chest/back all the way through.     Headache     Bilateral earache and fullness and bad headache still.    Ear Fullness    Vaginitis     Stated after taking the Z PACK she has a raging yeast infection and OTC Monistat is not working and request Diflucan.         Subjective    History of Present Illness HPI   Patient complains of 2 weeks of shortness of breath and cough.  He has tried a Z-Jean-Paul and a Decadron shot with some relief.  Patient reports that her cough has become productive.  She wanted to be re-evaluated.    Current Outpatient Medications:     albuterol (VENTOLIN HFA) 90 mcg/actuation inhaler, Inhale 2 puffs into the lungs every 6 (six) hours as needed for Wheezing. Rescue, Disp: 18 g, Rfl: 0    celecoxib (CELEBREX) 200 MG capsule, Take 200 mg by mouth once daily., Disp: , Rfl:     citalopram (CELEXA) 40 MG tablet, Take 1 tablet (40 mg total) by mouth once daily., Disp: 90 tablet, Rfl: 1    EUTHYROX 112 mcg tablet, Take 1 tablet (112 mcg total) by mouth once daily., Disp: 90 tablet, Rfl: 1    gabapentin (NEURONTIN) 300 MG capsule, Take 300 mg by mouth 2 (two) times a day., Disp: , Rfl:     HYDROcodone-acetaminophen (NORCO) 7.5-325 mg per tablet, Take 1 tablet by mouth 2 (two) times daily as needed., Disp: , Rfl:     metFORMIN (GLUCOPHAGE) 1000 MG tablet, Take 1 tablet (1,000 mg total) by mouth 2 (two) times daily., Disp: 180 tablet, Rfl: 1    methocarbamoL (ROBAXIN) 500 MG Tab, Take 500 mg by mouth 3 (three) times daily as needed., Disp: , Rfl:  "    simvastatin (ZOCOR) 10 MG tablet, Take 1 tablet (10 mg total) by mouth every evening., Disp: 90 tablet, Rfl: 1    cefUROXime (CEFTIN) 500 MG tablet, Take 1 tablet (500 mg total) by mouth every 12 (twelve) hours., Disp: 20 tablet, Rfl: 0    predniSONE (DELTASONE) 10 MG tablet, Take 1 tablet (10 mg total) by mouth once daily., Disp: 5 tablet, Rfl: 0     Past Medical History:   Diagnosis Date    Arthritis     Depression     Hyperlipidemia     Hypothyroidism     Thyroid disease         Family History   Problem Relation Age of Onset    No Known Problems Mother     Arthritis Father     Heart disease Father     Cancer Father     No Known Problems Sister         Past Surgical History:   Procedure Laterality Date    ADENOIDECTOMY      CHOLECYSTECTOMY      HYSTERECTOMY      TONSILLECTOMY          Review of Systems   Constitutional:  Negative for fatigue and fever.   HENT:  Positive for nasal congestion, postnasal drip, rhinorrhea and sinus pressure/congestion. Negative for ear pain.    Respiratory:  Positive for cough and chest tightness. Negative for shortness of breath.    Cardiovascular:  Negative for chest pain.   Gastrointestinal:  Negative for abdominal pain, diarrhea, nausea and vomiting.   Genitourinary:  Negative for dysuria.   Neurological:  Negative for headaches.      /76 (BP Location: Left arm, Patient Position: Sitting, BP Method: Large (Automatic))   Pulse 71   Temp 99.1 °F (37.3 °C) (Oral)   Resp 18   Ht 5' 8" (1.727 m)   Wt 86.6 kg (191 lb)   SpO2 96%   BMI 29.04 kg/m²      Physical Exam  HENT:      Head: Normocephalic and atraumatic.      Nose: Rhinorrhea present.      Mouth/Throat:      Pharynx: Oropharynx is clear.   Cardiovascular:      Rate and Rhythm: Normal rate and regular rhythm.   Pulmonary:      Effort: Pulmonary effort is normal.      Breath sounds: Wheezing present.   Neurological:      Mental Status: She is alert and oriented to person, place, and time.   Psychiatric:         " Mood and Affect: Mood normal.         Behavior: Behavior normal.        Assessment and Plan      ICD-10-CM ICD-9-CM   1. Bronchitis  J40 490   2. Productive cough  R05.8 786.2   3. Chest pain, unspecified type  R07.9 786.50        Problem List Items Addressed This Visit    None  Visit Diagnoses       Bronchitis    -  Primary    Relevant Medications    predniSONE (DELTASONE) 10 MG tablet    cefUROXime (CEFTIN) 500 MG tablet    Productive cough        Relevant Orders    X-Ray Chest PA And Lateral (Completed)    Chest pain, unspecified type        Relevant Orders    X-Ray Chest PA And Lateral (Completed)             Patient Instructions   Take medications as directed  Monitor temperature, if fever begins, tylenol or ibuprofen can be used as long as you have no history of abnormal reactions to these medications. Follow the instructions on the bottle or contact clinic with questions.   Please contact clinic if symptoms begin to get worse.   Report to the ER if you feel your symptoms are severe and life threatening.       Follow up if symptoms worsen or fail to improve.

## 2022-11-10 ENCOUNTER — OFFICE VISIT (OUTPATIENT)
Dept: FAMILY MEDICINE | Facility: CLINIC | Age: 57
End: 2022-11-10
Payer: COMMERCIAL

## 2022-11-10 VITALS
HEART RATE: 71 BPM | OXYGEN SATURATION: 98 % | WEIGHT: 191 LBS | TEMPERATURE: 99 F | RESPIRATION RATE: 18 BRPM | BODY MASS INDEX: 28.95 KG/M2 | DIASTOLIC BLOOD PRESSURE: 81 MMHG | SYSTOLIC BLOOD PRESSURE: 128 MMHG | HEIGHT: 68 IN

## 2022-11-10 DIAGNOSIS — J02.9 SORE THROAT: ICD-10-CM

## 2022-11-10 DIAGNOSIS — N39.0 URINARY TRACT INFECTION WITHOUT HEMATURIA, SITE UNSPECIFIED: ICD-10-CM

## 2022-11-10 DIAGNOSIS — Z12.31 ENCOUNTER FOR SCREENING MAMMOGRAM FOR MALIGNANT NEOPLASM OF BREAST: ICD-10-CM

## 2022-11-10 DIAGNOSIS — J02.9 PHARYNGITIS, UNSPECIFIED ETIOLOGY: Primary | ICD-10-CM

## 2022-11-10 DIAGNOSIS — R82.90 CLOUDY URINE: ICD-10-CM

## 2022-11-10 PROBLEM — Z90.710 HISTORY OF TOTAL ABDOMINAL HYSTERECTOMY: Status: ACTIVE | Noted: 2022-11-10

## 2022-11-10 PROBLEM — Z90.710 HISTORY OF TOTAL ABDOMINAL HYSTERECTOMY: Chronic | Status: ACTIVE | Noted: 2022-11-10

## 2022-11-10 PROBLEM — Z85.42 HISTORY OF ENDOMETRIAL CANCER: Status: ACTIVE | Noted: 2022-11-10

## 2022-11-10 PROBLEM — Z85.42 HISTORY OF ENDOMETRIAL CANCER: Chronic | Status: ACTIVE | Noted: 2022-11-10

## 2022-11-10 LAB
BILIRUB SERPL-MCNC: NEGATIVE MG/DL
BLOOD URINE, POC: NEGATIVE
COLOR, POC UA: YELLOW
CTP QC/QA: YES
CTP QC/QA: YES
FLUAV AG NPH QL: NEGATIVE
FLUBV AG NPH QL: NEGATIVE
GLUCOSE UR QL STRIP: NEGATIVE
KETONES UR QL STRIP: NEGATIVE
LEUKOCYTE ESTERASE URINE, POC: ABNORMAL
NITRITE, POC UA: NEGATIVE
PH, POC UA: 5.5
PROTEIN, POC: NEGATIVE
S PYO RRNA THROAT QL PROBE: NEGATIVE
SARS-COV-2 AG RESP QL IA.RAPID: NEGATIVE
SPECIFIC GRAVITY, POC UA: 1.02
UROBILINOGEN, POC UA: 0.2

## 2022-11-10 PROCEDURE — 87086 URINE CULTURE/COLONY COUNT: CPT | Mod: ,,, | Performed by: CLINICAL MEDICAL LABORATORY

## 2022-11-10 PROCEDURE — 87880 STREP A ASSAY W/OPTIC: CPT | Mod: QW,,, | Performed by: FAMILY MEDICINE

## 2022-11-10 PROCEDURE — 1159F PR MEDICATION LIST DOCUMENTED IN MEDICAL RECORD: ICD-10-PCS | Mod: CPTII,,, | Performed by: FAMILY MEDICINE

## 2022-11-10 PROCEDURE — 87428 POCT SARS-COV2 (COVID) WITH FLU ANTIGEN: ICD-10-PCS | Mod: QW,,, | Performed by: FAMILY MEDICINE

## 2022-11-10 PROCEDURE — 3066F NEPHROPATHY DOC TX: CPT | Mod: CPTII,,, | Performed by: FAMILY MEDICINE

## 2022-11-10 PROCEDURE — 81003 POCT URINALYSIS W/O SCOPE: ICD-10-PCS | Mod: QW,,, | Performed by: FAMILY MEDICINE

## 2022-11-10 PROCEDURE — 3079F DIAST BP 80-89 MM HG: CPT | Mod: CPTII,,, | Performed by: FAMILY MEDICINE

## 2022-11-10 PROCEDURE — 3044F PR MOST RECENT HEMOGLOBIN A1C LEVEL <7.0%: ICD-10-PCS | Mod: CPTII,,, | Performed by: FAMILY MEDICINE

## 2022-11-10 PROCEDURE — 87880 POCT RAPID STREP A: ICD-10-PCS | Mod: QW,,, | Performed by: FAMILY MEDICINE

## 2022-11-10 PROCEDURE — 3008F PR BODY MASS INDEX (BMI) DOCUMENTED: ICD-10-PCS | Mod: CPTII,,, | Performed by: FAMILY MEDICINE

## 2022-11-10 PROCEDURE — 1160F PR REVIEW ALL MEDS BY PRESCRIBER/CLIN PHARMACIST DOCUMENTED: ICD-10-PCS | Mod: CPTII,,, | Performed by: FAMILY MEDICINE

## 2022-11-10 PROCEDURE — 81003 URINALYSIS AUTO W/O SCOPE: CPT | Mod: QW,,, | Performed by: FAMILY MEDICINE

## 2022-11-10 PROCEDURE — 3044F HG A1C LEVEL LT 7.0%: CPT | Mod: CPTII,,, | Performed by: FAMILY MEDICINE

## 2022-11-10 PROCEDURE — 87428 SARSCOV & INF VIR A&B AG IA: CPT | Mod: QW,,, | Performed by: FAMILY MEDICINE

## 2022-11-10 PROCEDURE — 3061F NEG MICROALBUMINURIA REV: CPT | Mod: CPTII,,, | Performed by: FAMILY MEDICINE

## 2022-11-10 PROCEDURE — 3074F SYST BP LT 130 MM HG: CPT | Mod: CPTII,,, | Performed by: FAMILY MEDICINE

## 2022-11-10 PROCEDURE — 3008F BODY MASS INDEX DOCD: CPT | Mod: CPTII,,, | Performed by: FAMILY MEDICINE

## 2022-11-10 PROCEDURE — 3079F PR MOST RECENT DIASTOLIC BLOOD PRESSURE 80-89 MM HG: ICD-10-PCS | Mod: CPTII,,, | Performed by: FAMILY MEDICINE

## 2022-11-10 PROCEDURE — 3061F PR NEG MICROALBUMINURIA RESULT DOCUMENTED/REVIEW: ICD-10-PCS | Mod: CPTII,,, | Performed by: FAMILY MEDICINE

## 2022-11-10 PROCEDURE — 3066F PR DOCUMENTATION OF TREATMENT FOR NEPHROPATHY: ICD-10-PCS | Mod: CPTII,,, | Performed by: FAMILY MEDICINE

## 2022-11-10 PROCEDURE — 1160F RVW MEDS BY RX/DR IN RCRD: CPT | Mod: CPTII,,, | Performed by: FAMILY MEDICINE

## 2022-11-10 PROCEDURE — 87086 CULTURE, URINE: ICD-10-PCS | Mod: ,,, | Performed by: CLINICAL MEDICAL LABORATORY

## 2022-11-10 PROCEDURE — 1159F MED LIST DOCD IN RCRD: CPT | Mod: CPTII,,, | Performed by: FAMILY MEDICINE

## 2022-11-10 PROCEDURE — 99214 PR OFFICE/OUTPT VISIT, EST, LEVL IV, 30-39 MIN: ICD-10-PCS | Mod: ,,, | Performed by: FAMILY MEDICINE

## 2022-11-10 PROCEDURE — 3074F PR MOST RECENT SYSTOLIC BLOOD PRESSURE < 130 MM HG: ICD-10-PCS | Mod: CPTII,,, | Performed by: FAMILY MEDICINE

## 2022-11-10 PROCEDURE — 99214 OFFICE O/P EST MOD 30 MIN: CPT | Mod: ,,, | Performed by: FAMILY MEDICINE

## 2022-11-10 RX ORDER — AMOXICILLIN 875 MG/1
875 TABLET, FILM COATED ORAL EVERY 12 HOURS
Qty: 14 TABLET | Refills: 0 | Status: SHIPPED | OUTPATIENT
Start: 2022-11-10 | End: 2023-01-27 | Stop reason: ALTCHOICE

## 2022-11-10 NOTE — PROGRESS NOTES
New Clinic Note    Ene Clemons is a 57 y.o. female     CC:   Chief Complaint   Patient presents with    Sore Throat     Complain of a red sore throat for 48 hours and right ear discomfort. Stated she was exposed to someone sick Monday night and wants to make sure she does not have flu or strep.     Urinary Tract Infection     Stated she saw Dr Acosta with [facility] and her Presumptive Urine Drug Screen Test showed she had a possible UTI and was instructed to follow up with her PCP.         Subjective  Patient complains of sore throat and ear pain for 2 days. She denies fever, cough or congestion. She complains of urinary frequency. She has not taken anything for her symptoms.     Diabetes, type 2:    Checks glucose outside of clinic:yes  Keeps log of glucoses: no  Eats a diabetic diet: no  Regular cardiovascular exercise: no  Monitors feet: yes  Most recent HbA1c values:   Hemoglobin A1C   Date Value Ref Range Status   07/28/2022 5.7 4.5 - 6.6 % Final     Comment:       Normal:               <5.7%  Pre-Diabetic:       5.7% to 6.4%  Diabetic:             >6.4%  Diabetic Goal:     <7%   01/28/2022 5.3 4.5 - 6.6 % Final     Comment:       Normal:               <5.7%  Pre-Diabetic:       5.7% to 6.4%  Diabetic:             >6.4%  Diabetic Goal:     <7%      Takes an aspirin: no  On a statin: yes         Current Outpatient Medications:     celecoxib (CELEBREX) 200 MG capsule, Take 200 mg by mouth once daily., Disp: , Rfl:     citalopram (CELEXA) 40 MG tablet, Take 1 tablet (40 mg total) by mouth once daily., Disp: 90 tablet, Rfl: 1    EUTHYROX 112 mcg tablet, Take 1 tablet (112 mcg total) by mouth once daily., Disp: 90 tablet, Rfl: 1    gabapentin (NEURONTIN) 300 MG capsule, Take 300 mg by mouth 2 (two) times a day., Disp: , Rfl:     HYDROcodone-acetaminophen (NORCO) 7.5-325 mg per tablet, Take 1 tablet by mouth 2 (two) times daily as needed., Disp: , Rfl:     metFORMIN (GLUCOPHAGE) 1000 MG tablet, Take 1 tablet  "(1,000 mg total) by mouth 2 (two) times daily., Disp: 180 tablet, Rfl: 1    methocarbamoL (ROBAXIN) 500 MG Tab, Take 500 mg by mouth 3 (three) times daily as needed., Disp: , Rfl:     simvastatin (ZOCOR) 10 MG tablet, Take 1 tablet (10 mg total) by mouth every evening., Disp: 90 tablet, Rfl: 1    amoxicillin (AMOXIL) 875 MG tablet, Take 1 tablet (875 mg total) by mouth every 12 (twelve) hours., Disp: 14 tablet, Rfl: 0     Past Medical History:   Diagnosis Date    Arthritis     Depression     Hyperlipidemia     Hypothyroidism     Thyroid disease         Family History   Problem Relation Age of Onset    No Known Problems Mother     Arthritis Father     Heart disease Father     Cancer Father     No Known Problems Sister         Past Surgical History:   Procedure Laterality Date    ADENOIDECTOMY      CHOLECYSTECTOMY      HYSTERECTOMY      TONSILLECTOMY          Review of Systems   Constitutional:  Negative for fatigue and fever.   HENT:  Positive for nasal congestion, ear pain, sore throat and trouble swallowing. Negative for drooling, ear discharge, postnasal drip, rhinorrhea and sinus pressure/congestion.    Respiratory:  Negative for cough, shortness of breath and stridor.    Cardiovascular:  Negative for chest pain.   Gastrointestinal:  Negative for abdominal pain, diarrhea, nausea and vomiting.   Genitourinary:  Negative for dysuria.   Musculoskeletal:  Positive for neck pain.   Neurological:  Positive for headaches.      /81 (BP Location: Left arm, Patient Position: Sitting, BP Method: Large (Automatic))   Pulse 71   Temp 98.8 °F (37.1 °C) (Oral)   Resp 18   Ht 5' 8" (1.727 m)   Wt 86.6 kg (191 lb)   SpO2 98%   BMI 29.04 kg/m²      Physical Exam  HENT:      Head: Normocephalic and atraumatic.      Mouth/Throat:      Pharynx: Posterior oropharyngeal erythema present.   Cardiovascular:      Rate and Rhythm: Normal rate and regular rhythm.   Pulmonary:      Effort: Pulmonary effort is normal.      Breath " sounds: Normal breath sounds.   Neurological:      Mental Status: She is alert and oriented to person, place, and time.   Psychiatric:         Mood and Affect: Mood normal.         Behavior: Behavior normal.        Assessment and Plan      ICD-10-CM ICD-9-CM   1. Pharyngitis, unspecified etiology  J02.9 462   2. Sore throat  J02.9 462   3. Cloudy urine  R82.90 791.9   4. Urinary tract infection without hematuria, site unspecified  N39.0 599.0   5. Encounter for screening mammogram for malignant neoplasm of breast  Z12.31 V76.12        Problem List Items Addressed This Visit    None  Visit Diagnoses       Pharyngitis, unspecified etiology    -  Primary    Relevant Medications    amoxicillin (AMOXIL) 875 MG tablet    Sore throat        Cloudy urine        Relevant Orders    Urine culture    Urinary tract infection without hematuria, site unspecified        Encounter for screening mammogram for malignant neoplasm of breast        Relevant Orders    Mammo Digital Screening Bilat             Patient Instructions   Take medications as directed  Monitor temperature, if fever begins, tylenol or ibuprofen can be used as long as you have no history of abnormal reactions to these medications. Follow the instructions on the bottle or contact clinic with questions.   Please contact clinic if symptoms begin to get worse.   Report to the ER if you feel your symptoms are severe and life threatening.       Follow up if symptoms worsen or fail to improve.

## 2022-11-12 LAB — UA COMPLETE W REFLEX CULTURE PNL UR: NORMAL

## 2022-11-30 ENCOUNTER — OFFICE VISIT (OUTPATIENT)
Dept: FAMILY MEDICINE | Facility: CLINIC | Age: 57
End: 2022-11-30
Payer: COMMERCIAL

## 2022-11-30 VITALS
OXYGEN SATURATION: 98 % | SYSTOLIC BLOOD PRESSURE: 124 MMHG | WEIGHT: 196.38 LBS | DIASTOLIC BLOOD PRESSURE: 80 MMHG | TEMPERATURE: 98 F | HEART RATE: 78 BPM | RESPIRATION RATE: 17 BRPM | BODY MASS INDEX: 29.86 KG/M2

## 2022-11-30 DIAGNOSIS — K57.92 DIVERTICULITIS: Primary | ICD-10-CM

## 2022-11-30 DIAGNOSIS — R10.30 LOWER ABDOMINAL PAIN: ICD-10-CM

## 2022-11-30 LAB
BILIRUB SERPL-MCNC: NORMAL MG/DL
BLOOD URINE, POC: NEGATIVE
COLOR, POC UA: YELLOW
GLUCOSE UR QL STRIP: NEGATIVE
KETONES UR QL STRIP: NEGATIVE
LEUKOCYTE ESTERASE URINE, POC: NORMAL
NITRITE, POC UA: NEGATIVE
PH, POC UA: 5.5
PROTEIN, POC: NEGATIVE
SPECIFIC GRAVITY, POC UA: 1.02
UROBILINOGEN, POC UA: 0.2

## 2022-11-30 PROCEDURE — 87086 CULTURE, URINE: ICD-10-PCS | Mod: ,,, | Performed by: CLINICAL MEDICAL LABORATORY

## 2022-11-30 PROCEDURE — 3044F HG A1C LEVEL LT 7.0%: CPT | Mod: CPTII,,, | Performed by: FAMILY MEDICINE

## 2022-11-30 PROCEDURE — 3079F PR MOST RECENT DIASTOLIC BLOOD PRESSURE 80-89 MM HG: ICD-10-PCS | Mod: CPTII,,, | Performed by: FAMILY MEDICINE

## 2022-11-30 PROCEDURE — 3079F DIAST BP 80-89 MM HG: CPT | Mod: CPTII,,, | Performed by: FAMILY MEDICINE

## 2022-11-30 PROCEDURE — 99213 OFFICE O/P EST LOW 20 MIN: CPT | Mod: ,,, | Performed by: FAMILY MEDICINE

## 2022-11-30 PROCEDURE — 1159F PR MEDICATION LIST DOCUMENTED IN MEDICAL RECORD: ICD-10-PCS | Mod: CPTII,,, | Performed by: FAMILY MEDICINE

## 2022-11-30 PROCEDURE — 3061F NEG MICROALBUMINURIA REV: CPT | Mod: CPTII,,, | Performed by: FAMILY MEDICINE

## 2022-11-30 PROCEDURE — 3074F PR MOST RECENT SYSTOLIC BLOOD PRESSURE < 130 MM HG: ICD-10-PCS | Mod: CPTII,,, | Performed by: FAMILY MEDICINE

## 2022-11-30 PROCEDURE — 1159F MED LIST DOCD IN RCRD: CPT | Mod: CPTII,,, | Performed by: FAMILY MEDICINE

## 2022-11-30 PROCEDURE — 87086 URINE CULTURE/COLONY COUNT: CPT | Mod: ,,, | Performed by: CLINICAL MEDICAL LABORATORY

## 2022-11-30 PROCEDURE — 3044F PR MOST RECENT HEMOGLOBIN A1C LEVEL <7.0%: ICD-10-PCS | Mod: CPTII,,, | Performed by: FAMILY MEDICINE

## 2022-11-30 PROCEDURE — 1160F PR REVIEW ALL MEDS BY PRESCRIBER/CLIN PHARMACIST DOCUMENTED: ICD-10-PCS | Mod: CPTII,,, | Performed by: FAMILY MEDICINE

## 2022-11-30 PROCEDURE — 3008F BODY MASS INDEX DOCD: CPT | Mod: CPTII,,, | Performed by: FAMILY MEDICINE

## 2022-11-30 PROCEDURE — 81003 POCT URINALYSIS W/O SCOPE: ICD-10-PCS | Mod: QW,,, | Performed by: FAMILY MEDICINE

## 2022-11-30 PROCEDURE — 1160F RVW MEDS BY RX/DR IN RCRD: CPT | Mod: CPTII,,, | Performed by: FAMILY MEDICINE

## 2022-11-30 PROCEDURE — 3074F SYST BP LT 130 MM HG: CPT | Mod: CPTII,,, | Performed by: FAMILY MEDICINE

## 2022-11-30 PROCEDURE — 3066F NEPHROPATHY DOC TX: CPT | Mod: CPTII,,, | Performed by: FAMILY MEDICINE

## 2022-11-30 PROCEDURE — 3066F PR DOCUMENTATION OF TREATMENT FOR NEPHROPATHY: ICD-10-PCS | Mod: CPTII,,, | Performed by: FAMILY MEDICINE

## 2022-11-30 PROCEDURE — 3008F PR BODY MASS INDEX (BMI) DOCUMENTED: ICD-10-PCS | Mod: CPTII,,, | Performed by: FAMILY MEDICINE

## 2022-11-30 PROCEDURE — 81003 URINALYSIS AUTO W/O SCOPE: CPT | Mod: QW,,, | Performed by: FAMILY MEDICINE

## 2022-11-30 PROCEDURE — 99213 PR OFFICE/OUTPT VISIT, EST, LEVL III, 20-29 MIN: ICD-10-PCS | Mod: ,,, | Performed by: FAMILY MEDICINE

## 2022-11-30 PROCEDURE — 3061F PR NEG MICROALBUMINURIA RESULT DOCUMENTED/REVIEW: ICD-10-PCS | Mod: CPTII,,, | Performed by: FAMILY MEDICINE

## 2022-11-30 RX ORDER — FLUCONAZOLE 150 MG/1
150 TABLET ORAL DAILY
Qty: 2 TABLET | Refills: 0 | Status: SHIPPED | OUTPATIENT
Start: 2022-11-30 | End: 2022-12-01

## 2022-11-30 RX ORDER — METRONIDAZOLE 500 MG/1
500 TABLET ORAL EVERY 12 HOURS
Qty: 14 TABLET | Refills: 0 | Status: SHIPPED | OUTPATIENT
Start: 2022-11-30 | End: 2023-01-27 | Stop reason: ALTCHOICE

## 2022-11-30 RX ORDER — CIPROFLOXACIN 500 MG/1
500 TABLET ORAL EVERY 12 HOURS
Qty: 14 TABLET | Refills: 0 | Status: SHIPPED | OUTPATIENT
Start: 2022-11-30 | End: 2023-01-27 | Stop reason: ALTCHOICE

## 2022-11-30 NOTE — PROGRESS NOTES
New Clinic Note    Ene Clemons is a 57 y.o. female     CC:   Chief Complaint   Patient presents with    Abdominal Pain     Patient states she has been having lower abdominal pain for about 5 days. States its 8/10. Patient have been taking Ibuprofen and Simethicone for it. Medication have not worked for it. States she has pain on both side but its mainly in the lower abdominal on the left side and radiating to lower back.    Medication Problem     Patient states you put her on amoxicillin last visit and she believe she recieve a yeast infection from the antibiotics. She doesn't no if that's the reason she having lower abdominal pain or not.     Breast Problem     Patient states both of her breast are tender and nipples are very hard.         Subjective    History of Present Illness   Patient complains of left lower quadrant pain for 5 days. She reports that the pain radiates to her back. She complains of fever and diarrhea. She has taken Pepto without relief.     Current Outpatient Medications:     celecoxib (CELEBREX) 200 MG capsule, Take 200 mg by mouth once daily., Disp: , Rfl:     citalopram (CELEXA) 40 MG tablet, Take 1 tablet (40 mg total) by mouth once daily., Disp: 90 tablet, Rfl: 1    EUTHYROX 112 mcg tablet, Take 1 tablet (112 mcg total) by mouth once daily., Disp: 90 tablet, Rfl: 1    gabapentin (NEURONTIN) 300 MG capsule, Take 300 mg by mouth 2 (two) times a day., Disp: , Rfl:     HYDROcodone-acetaminophen (NORCO) 7.5-325 mg per tablet, Take 1 tablet by mouth 2 (two) times daily as needed., Disp: , Rfl:     metFORMIN (GLUCOPHAGE) 1000 MG tablet, Take 1 tablet (1,000 mg total) by mouth 2 (two) times daily., Disp: 180 tablet, Rfl: 1    methocarbamoL (ROBAXIN) 500 MG Tab, Take 500 mg by mouth 3 (three) times daily as needed., Disp: , Rfl:     simvastatin (ZOCOR) 10 MG tablet, Take 1 tablet (10 mg total) by mouth every evening., Disp: 90 tablet, Rfl: 1    acyclovir (ZOVIRAX) 400 MG tablet, Take 1 tablet  (400 mg total) by mouth 2 (two) times daily., Disp: 60 tablet, Rfl: 11    amoxicillin (AMOXIL) 875 MG tablet, Take 1 tablet (875 mg total) by mouth every 12 (twelve) hours. (Patient not taking: Reported on 11/30/2022), Disp: 14 tablet, Rfl: 0    ciprofloxacin HCl (CIPRO) 500 MG tablet, Take 1 tablet (500 mg total) by mouth every 12 (twelve) hours. (Patient not taking: Reported on 12/13/2022), Disp: 14 tablet, Rfl: 0    metroNIDAZOLE (FLAGYL) 500 MG tablet, Take 1 tablet (500 mg total) by mouth every 12 (twelve) hours. (Patient not taking: Reported on 12/13/2022), Disp: 14 tablet, Rfl: 0     Past Medical History:   Diagnosis Date    Arthritis     Depression     Hyperlipidemia     Hypothyroidism     Thyroid disease         Family History   Problem Relation Age of Onset    No Known Problems Mother     Arthritis Father     Heart disease Father     Cancer Father     No Known Problems Sister         Past Surgical History:   Procedure Laterality Date    ADENOIDECTOMY      CHOLECYSTECTOMY      HYSTERECTOMY      TONSILLECTOMY      TOTAL THYROIDECTOMY          Review of Systems   Constitutional:  Positive for fever. Negative for fatigue.   HENT:  Negative for ear pain, postnasal drip, rhinorrhea and sinus pressure/congestion.    Respiratory:  Negative for cough and shortness of breath.    Cardiovascular:  Negative for chest pain.   Gastrointestinal:  Positive for abdominal pain, diarrhea and nausea. Negative for constipation and vomiting.   Genitourinary:  Negative for dysuria, frequency and hematuria.   Musculoskeletal:  Positive for arthralgias and myalgias.   Neurological:  Positive for headaches.      /80 (BP Location: Left arm, Patient Position: Sitting, BP Method: Large (Manual))   Pulse 78   Temp 98.4 °F (36.9 °C) (Oral)   Resp 17   Wt 89.1 kg (196 lb 6.4 oz)   SpO2 98%   BMI 29.86 kg/m²      Physical Exam  HENT:      Head: Normocephalic and atraumatic.      Mouth/Throat:      Pharynx: Oropharynx is clear.    Cardiovascular:      Rate and Rhythm: Normal rate and regular rhythm.   Pulmonary:      Effort: Pulmonary effort is normal.      Breath sounds: Normal breath sounds.   Abdominal:      General: Abdomen is flat. Bowel sounds are normal.      Palpations: Abdomen is soft.      Tenderness: There is abdominal tenderness in the left lower quadrant.   Neurological:      Mental Status: She is alert and oriented to person, place, and time.   Psychiatric:         Mood and Affect: Mood normal.         Behavior: Behavior normal.        Assessment and Plan      ICD-10-CM ICD-9-CM   1. Diverticulitis  K57.92 562.11   2. Lower abdominal pain  R10.30 789.09        Problem List Items Addressed This Visit    None  Visit Diagnoses       Diverticulitis    -  Primary    Relevant Medications    metroNIDAZOLE (FLAGYL) 500 MG tablet    ciprofloxacin HCl (CIPRO) 500 MG tablet    Lower abdominal pain        Relevant Orders    Urine culture (Completed)    POCT URINALYSIS W/O SCOPE (Completed)             Patient Instructions   Take meds as directed.   Drink plenty of fluids. [place] diet if tolerated.  If unable to keep fluids down, return to clinic or go to ER.       Follow up if symptoms worsen or fail to improve.

## 2022-12-01 LAB — CRC RECOMMENDATION EXT: NORMAL

## 2022-12-02 LAB — UA COMPLETE W REFLEX CULTURE PNL UR: NORMAL

## 2022-12-12 ENCOUNTER — PATIENT MESSAGE (OUTPATIENT)
Dept: FAMILY MEDICINE | Facility: CLINIC | Age: 57
End: 2022-12-12
Payer: COMMERCIAL

## 2022-12-13 ENCOUNTER — OFFICE VISIT (OUTPATIENT)
Dept: OBSTETRICS AND GYNECOLOGY | Facility: CLINIC | Age: 57
End: 2022-12-13
Payer: COMMERCIAL

## 2022-12-13 VITALS
DIASTOLIC BLOOD PRESSURE: 86 MMHG | SYSTOLIC BLOOD PRESSURE: 135 MMHG | HEART RATE: 92 BPM | BODY MASS INDEX: 30.31 KG/M2 | RESPIRATION RATE: 17 BRPM | OXYGEN SATURATION: 99 % | HEIGHT: 68 IN | WEIGHT: 200 LBS

## 2022-12-13 DIAGNOSIS — A60.00 GENITAL HERPES SIMPLEX, UNSPECIFIED SITE: ICD-10-CM

## 2022-12-13 DIAGNOSIS — R68.82 DECREASED LIBIDO: ICD-10-CM

## 2022-12-13 DIAGNOSIS — Z76.0 MEDICATION REFILL: ICD-10-CM

## 2022-12-13 DIAGNOSIS — Z01.419 WOMEN'S ANNUAL ROUTINE GYNECOLOGICAL EXAMINATION: Primary | ICD-10-CM

## 2022-12-13 DIAGNOSIS — Z11.51 SCREENING FOR HPV (HUMAN PAPILLOMAVIRUS): ICD-10-CM

## 2022-12-13 DIAGNOSIS — Z12.72 VAGINAL PAP SMEAR: ICD-10-CM

## 2022-12-13 PROCEDURE — 3008F BODY MASS INDEX DOCD: CPT | Mod: CPTII,,, | Performed by: ADVANCED PRACTICE MIDWIFE

## 2022-12-13 PROCEDURE — 3075F SYST BP GE 130 - 139MM HG: CPT | Mod: CPTII,,, | Performed by: ADVANCED PRACTICE MIDWIFE

## 2022-12-13 PROCEDURE — 3079F PR MOST RECENT DIASTOLIC BLOOD PRESSURE 80-89 MM HG: ICD-10-PCS | Mod: CPTII,,, | Performed by: ADVANCED PRACTICE MIDWIFE

## 2022-12-13 PROCEDURE — 3066F PR DOCUMENTATION OF TREATMENT FOR NEPHROPATHY: ICD-10-PCS | Mod: CPTII,,, | Performed by: ADVANCED PRACTICE MIDWIFE

## 2022-12-13 PROCEDURE — 87624 HUMAN PAPILLOMAVIRUS (HPV): ICD-10-PCS | Mod: ,,, | Performed by: CLINICAL MEDICAL LABORATORY

## 2022-12-13 PROCEDURE — 88142 CYTOPATH C/V THIN LAYER: CPT | Mod: TC,GCY | Performed by: ADVANCED PRACTICE MIDWIFE

## 2022-12-13 PROCEDURE — 3066F NEPHROPATHY DOC TX: CPT | Mod: CPTII,,, | Performed by: ADVANCED PRACTICE MIDWIFE

## 2022-12-13 PROCEDURE — 3008F PR BODY MASS INDEX (BMI) DOCUMENTED: ICD-10-PCS | Mod: CPTII,,, | Performed by: ADVANCED PRACTICE MIDWIFE

## 2022-12-13 PROCEDURE — 99386 PR PREVENTIVE VISIT,NEW,40-64: ICD-10-PCS | Mod: ,,, | Performed by: ADVANCED PRACTICE MIDWIFE

## 2022-12-13 PROCEDURE — 3075F PR MOST RECENT SYSTOLIC BLOOD PRESS GE 130-139MM HG: ICD-10-PCS | Mod: CPTII,,, | Performed by: ADVANCED PRACTICE MIDWIFE

## 2022-12-13 PROCEDURE — 3079F DIAST BP 80-89 MM HG: CPT | Mod: CPTII,,, | Performed by: ADVANCED PRACTICE MIDWIFE

## 2022-12-13 PROCEDURE — 87624 HPV HI-RISK TYP POOLED RSLT: CPT | Mod: ,,, | Performed by: CLINICAL MEDICAL LABORATORY

## 2022-12-13 PROCEDURE — 99386 PREV VISIT NEW AGE 40-64: CPT | Mod: ,,, | Performed by: ADVANCED PRACTICE MIDWIFE

## 2022-12-13 PROCEDURE — 3044F PR MOST RECENT HEMOGLOBIN A1C LEVEL <7.0%: ICD-10-PCS | Mod: CPTII,,, | Performed by: ADVANCED PRACTICE MIDWIFE

## 2022-12-13 PROCEDURE — 3061F NEG MICROALBUMINURIA REV: CPT | Mod: CPTII,,, | Performed by: ADVANCED PRACTICE MIDWIFE

## 2022-12-13 PROCEDURE — 1159F PR MEDICATION LIST DOCUMENTED IN MEDICAL RECORD: ICD-10-PCS | Mod: CPTII,,, | Performed by: ADVANCED PRACTICE MIDWIFE

## 2022-12-13 PROCEDURE — 3061F PR NEG MICROALBUMINURIA RESULT DOCUMENTED/REVIEW: ICD-10-PCS | Mod: CPTII,,, | Performed by: ADVANCED PRACTICE MIDWIFE

## 2022-12-13 PROCEDURE — 3044F HG A1C LEVEL LT 7.0%: CPT | Mod: CPTII,,, | Performed by: ADVANCED PRACTICE MIDWIFE

## 2022-12-13 PROCEDURE — 1159F MED LIST DOCD IN RCRD: CPT | Mod: CPTII,,, | Performed by: ADVANCED PRACTICE MIDWIFE

## 2022-12-13 RX ORDER — ACYCLOVIR 400 MG/1
400 TABLET ORAL 2 TIMES DAILY
Qty: 60 TABLET | Refills: 11 | Status: SHIPPED | OUTPATIENT
Start: 2022-12-13 | End: 2023-12-13

## 2022-12-13 NOTE — PROGRESS NOTES
Patient ID: Ene Clemons is a 57 y.o. female     Chief Complaint:   Chief Complaint   Patient presents with    Annual Exam       HPI: Ene Clemons is a 57 y.o. female who presents for well woman exam with Pap. She has had a hysterectomy d/t endometrial cancer. Last Pap done by BRITTNI Monroy 5/17/2019. C/O decreased libido, was told to not take HRT d/t increased cancer risk, currently on Celexa for management of menopausal symptoms. Will review non-hormonal management option to improve libido and refer if needed. Requesting refill on acyclovir which she uses at least once yearly for herpes outbreak.    LMP: No LMP recorded. Patient has had a hysterectomy.  Sexually active: yes, , declines STD testing  Contraceptive: n/a  Mammogram: Completed 12/9/2022 at [facility]    Past Medical History:   Diagnosis Date    Arthritis     Depression     Hyperlipidemia     Hypothyroidism     Thyroid disease        Past Surgical History:   Procedure Laterality Date    ADENOIDECTOMY      CHOLECYSTECTOMY      HYSTERECTOMY      TONSILLECTOMY      TOTAL THYROIDECTOMY         Social History     Socioeconomic History    Marital status:      Spouse name: Hector    Number of children: 0   Occupational History    Occupation: retired   Tobacco Use    Smoking status: Every Day     Types: Cigarettes     Passive exposure: Current    Smokeless tobacco: Never   Substance and Sexual Activity    Alcohol use: Not Currently    Drug use: Never    Sexual activity: Yes     Partners: Male     Social Determinants of Health     Financial Resource Strain: Low Risk     Difficulty of Paying Living Expenses: Not hard at all   Food Insecurity: No Food Insecurity    Worried About Running Out of Food in the Last Year: Never true    Ran Out of Food in the Last Year: Never true   Transportation Needs: No Transportation Needs    Lack of Transportation (Medical): No    Lack of Transportation (Non-Medical): No  "  Physical Activity: Inactive    Days of Exercise per Week: 0 days    Minutes of Exercise per Session: 0 min   Stress: No Stress Concern Present    Feeling of Stress : Not at all   Social Connections: Moderately Isolated    Frequency of Communication with Friends and Family: More than three times a week    Frequency of Social Gatherings with Friends and Family: More than three times a week    Attends Denominational Services: Never    Active Member of Clubs or Organizations: No    Attends Club or Organization Meetings: Never    Marital Status:    Housing Stability: Low Risk     Unable to Pay for Housing in the Last Year: No    Number of Places Lived in the Last Year: 0    Unstable Housing in the Last Year: No       Family History   Problem Relation Age of Onset    No Known Problems Mother     Arthritis Father     Heart disease Father     Cancer Father     No Known Problems Sister        OB History          0    Para   0    Term   0       0    AB   0    Living   0         SAB   0    IAB   0    Ectopic   0    Multiple   0    Live Births   0                 /86 (BP Location: Right arm)   Pulse 92   Resp 17   Ht 5' 8" (1.727 m)   Wt 90.7 kg (200 lb)   SpO2 99%   BMI 30.41 kg/m²     Wt Readings from Last 3 Encounters:   22 90.7 kg (200 lb)   22 89.1 kg (196 lb 6.4 oz)   11/10/22 86.6 kg (191 lb)        ROS:  Review of Systems   Constitutional: Negative.    Eyes: Negative.    Respiratory: Negative.     Cardiovascular: Negative.    Gastrointestinal: Negative.    Endocrine: Negative.    Genitourinary:  Positive for decreased libido.   Musculoskeletal: Negative.    Integumentary:  Negative.   Neurological: Negative.    Hematological: Negative.    Psychiatric/Behavioral: Negative.     Breast: negative.       PHYSICAL EXAM:  Physical Exam  Constitutional:       Appearance: Normal appearance. She is obese.   HENT:      Head: Normocephalic.      Nose: Nose normal.   Eyes: "      Extraocular Movements: Extraocular movements intact.   Cardiovascular:      Rate and Rhythm: Normal rate and regular rhythm.      Pulses: Normal pulses.      Heart sounds: Normal heart sounds.   Pulmonary:      Effort: Pulmonary effort is normal.      Breath sounds: Normal breath sounds.   Abdominal:      Palpations: Abdomen is soft.      Hernia: There is no hernia in the left inguinal area or right inguinal area.   Genitourinary:     General: Normal vulva.      Exam position: Lithotomy position.      Pubic Area: No rash.       Labia:         Right: No rash.         Left: No rash.       Urethra: No prolapse.      Vagina: Normal.      Uterus: Absent.       Adnexa:         Right: No mass or tenderness.          Left: No mass or tenderness.        Comments: Uterus with cervix surgically absent. Mild vaginal atrophy noted.   Musculoskeletal:         General: Normal range of motion.      Cervical back: Normal range of motion and neck supple.   Skin:     General: Skin is warm and dry.   Neurological:      Mental Status: She is alert and oriented to person, place, and time.   Psychiatric:         Mood and Affect: Mood normal.         Behavior: Behavior normal.         Thought Content: Thought content normal.         Judgment: Judgment normal.        Assessment:  Women's annual routine gynecological examination  -     ThinPrep Pap Test; Future; Expected date: 12/13/2022    Vaginal Pap smear  -     ThinPrep Pap Test; Future; Expected date: 12/13/2022    Screening for HPV (human papillomavirus)  -     ThinPrep Pap Test; Future; Expected date: 12/13/2022    Genital herpes simplex, unspecified site  -     acyclovir (ZOVIRAX) 400 MG tablet; Take 1 tablet (400 mg total) by mouth 2 (two) times daily.  Dispense: 60 tablet; Refill: 11    Medication refill  -     acyclovir (ZOVIRAX) 400 MG tablet; Take 1 tablet (400 mg total) by mouth 2 (two) times daily.  Dispense: 60 tablet; Refill: 11    BMI 30.0-30.9,adult    Decreased  libido          ICD-10-CM ICD-9-CM    1. Women's annual routine gynecological examination  Z01.419 V72.31 ThinPrep Pap Test      ThinPrep Pap Test      2. Vaginal Pap smear  Z12.72 V76.47 ThinPrep Pap Test      ThinPrep Pap Test      3. Screening for HPV (human papillomavirus)  Z11.51 V73.81 ThinPrep Pap Test      ThinPrep Pap Test      4. Genital herpes simplex, unspecified site  A60.00 054.10 acyclovir (ZOVIRAX) 400 MG tablet      5. Medication refill  Z76.0 V68.1 acyclovir (ZOVIRAX) 400 MG tablet      6. BMI 30.0-30.9,adult  Z68.30 V85.30       7. Decreased libido  R68.82 799.81           Plan:  Annual exam completed. Vag Pap collected  Will call with results  Patient was counseled today on ASCCP Pap guidelines and recommendations for yearly pelvic exams   Encouraged healthy dietary choices, increasing water intake, and exercising at least 3 x weekly  RX acyclovir refills sent as requested  Will send for gyn consult if desires further care for decreased libido  Discussed Bonafide product hormone free Ristela    Follow up in about 1 year (around 12/13/2023) for annual gyn exam.

## 2022-12-14 PROBLEM — R68.82 DECREASED LIBIDO: Status: ACTIVE | Noted: 2022-12-14

## 2022-12-15 LAB
GH SERPL-MCNC: NORMAL NG/ML
INSULIN SERPL-ACNC: NORMAL U[IU]/ML
LAB AP CLINICAL INFORMATION: NORMAL
LAB AP COMMENTS: NORMAL
LAB AP GYN INTERPRETATION: NEGATIVE
LAB AP PAP DISCLAIMER COMMENTS: NORMAL
RENIN PLAS-CCNC: NORMAL NG/ML/H

## 2022-12-19 LAB
HPV 16: NEGATIVE
HPV 18: NEGATIVE
HPV OTHER: NEGATIVE

## 2022-12-23 NOTE — PATIENT INSTRUCTIONS
Take meds as directed.   Drink plenty of fluids. Mobile diet if tolerated.  If unable to keep fluids down, return to clinic or go to ER.

## 2022-12-30 LAB — BCS RECOMMENDATION EXT: NORMAL

## 2023-01-27 ENCOUNTER — OFFICE VISIT (OUTPATIENT)
Dept: FAMILY MEDICINE | Facility: CLINIC | Age: 58
End: 2023-01-27
Payer: COMMERCIAL

## 2023-01-27 VITALS
BODY MASS INDEX: 29.73 KG/M2 | RESPIRATION RATE: 17 BRPM | HEIGHT: 68 IN | HEART RATE: 72 BPM | WEIGHT: 196.19 LBS | TEMPERATURE: 98 F | DIASTOLIC BLOOD PRESSURE: 76 MMHG | OXYGEN SATURATION: 95 % | SYSTOLIC BLOOD PRESSURE: 128 MMHG

## 2023-01-27 DIAGNOSIS — E03.9 ACQUIRED HYPOTHYROIDISM: ICD-10-CM

## 2023-01-27 DIAGNOSIS — Z79.4 TYPE 2 DIABETES MELLITUS WITHOUT COMPLICATION, WITH LONG-TERM CURRENT USE OF INSULIN: Primary | ICD-10-CM

## 2023-01-27 DIAGNOSIS — E78.2 MIXED HYPERLIPIDEMIA: ICD-10-CM

## 2023-01-27 DIAGNOSIS — F33.9 RECURRENT MAJOR DEPRESSIVE DISORDER, REMISSION STATUS UNSPECIFIED: ICD-10-CM

## 2023-01-27 DIAGNOSIS — E11.9 TYPE 2 DIABETES MELLITUS WITHOUT COMPLICATION, WITH LONG-TERM CURRENT USE OF INSULIN: Primary | ICD-10-CM

## 2023-01-27 LAB
ALBUMIN SERPL BCP-MCNC: 4.2 G/DL (ref 3.5–5)
ALBUMIN/GLOB SERPL: 1.1 {RATIO}
ALP SERPL-CCNC: 115 U/L (ref 46–118)
ALT SERPL W P-5'-P-CCNC: 26 U/L (ref 13–56)
ANION GAP SERPL CALCULATED.3IONS-SCNC: 6 MMOL/L (ref 7–16)
AST SERPL W P-5'-P-CCNC: 11 U/L (ref 15–37)
BASOPHILS # BLD AUTO: 0.1 K/UL (ref 0–0.2)
BASOPHILS NFR BLD AUTO: 1.2 % (ref 0–1)
BILIRUB SERPL-MCNC: 0.5 MG/DL (ref ?–1.2)
BUN SERPL-MCNC: 18 MG/DL (ref 7–18)
BUN/CREAT SERPL: 20 (ref 6–20)
CALCIUM SERPL-MCNC: 10.5 MG/DL (ref 8.5–10.1)
CHLORIDE SERPL-SCNC: 107 MMOL/L (ref 98–107)
CHOLEST SERPL-MCNC: 173 MG/DL (ref 0–200)
CHOLEST/HDLC SERPL: 3.8 {RATIO}
CO2 SERPL-SCNC: 31 MMOL/L (ref 21–32)
CREAT SERPL-MCNC: 0.88 MG/DL (ref 0.55–1.02)
CREAT UR-MCNC: 138 MG/DL (ref 28–219)
DIFFERENTIAL METHOD BLD: ABNORMAL
EGFR (NO RACE VARIABLE) (RUSH/TITUS): 76 ML/MIN/1.73M²
EOSINOPHIL # BLD AUTO: 0.49 K/UL (ref 0–0.5)
EOSINOPHIL NFR BLD AUTO: 5.6 % (ref 1–4)
ERYTHROCYTE [DISTWIDTH] IN BLOOD BY AUTOMATED COUNT: 12.8 % (ref 11.5–14.5)
EST. AVERAGE GLUCOSE BLD GHB EST-MCNC: 114 MG/DL
GLOBULIN SER-MCNC: 3.9 G/DL (ref 2–4)
GLUCOSE SERPL-MCNC: 110 MG/DL (ref 74–106)
HBA1C MFR BLD HPLC: 6 % (ref 4.5–6.6)
HCT VFR BLD AUTO: 43.8 % (ref 38–47)
HDLC SERPL-MCNC: 46 MG/DL (ref 40–60)
HGB BLD-MCNC: 14.2 G/DL (ref 12–16)
IMM GRANULOCYTES # BLD AUTO: 0.05 K/UL (ref 0–0.04)
IMM GRANULOCYTES NFR BLD: 0.6 % (ref 0–0.4)
LDLC SERPL CALC-MCNC: 86 MG/DL
LDLC/HDLC SERPL: 1.9 {RATIO}
LYMPHOCYTES # BLD AUTO: 2.22 K/UL (ref 1–4.8)
LYMPHOCYTES NFR BLD AUTO: 25.6 % (ref 27–41)
MCH RBC QN AUTO: 28.9 PG (ref 27–31)
MCHC RBC AUTO-ENTMCNC: 32.4 G/DL (ref 32–36)
MCV RBC AUTO: 89 FL (ref 80–96)
MICROALBUMIN UR-MCNC: 0.8 MG/DL (ref 0–2.8)
MICROALBUMIN/CREAT RATIO PNL UR: 5.8 MG/G (ref 0–30)
MONOCYTES # BLD AUTO: 0.58 K/UL (ref 0–0.8)
MONOCYTES NFR BLD AUTO: 6.7 % (ref 2–6)
MPC BLD CALC-MCNC: 11.4 FL (ref 9.4–12.4)
NEUTROPHILS # BLD AUTO: 5.24 K/UL (ref 1.8–7.7)
NEUTROPHILS NFR BLD AUTO: 60.3 % (ref 53–65)
NONHDLC SERPL-MCNC: 127 MG/DL
NRBC # BLD AUTO: 0 X10E3/UL
NRBC, AUTO (.00): 0 %
PLATELET # BLD AUTO: 268 K/UL (ref 150–400)
POTASSIUM SERPL-SCNC: 4.1 MMOL/L (ref 3.5–5.1)
PROT SERPL-MCNC: 8.1 G/DL (ref 6.4–8.2)
RBC # BLD AUTO: 4.92 M/UL (ref 4.2–5.4)
SODIUM SERPL-SCNC: 140 MMOL/L (ref 136–145)
TRIGL SERPL-MCNC: 205 MG/DL (ref 35–150)
TSH SERPL DL<=0.005 MIU/L-ACNC: 2.69 UIU/ML (ref 0.36–3.74)
VLDLC SERPL-MCNC: 41 MG/DL
WBC # BLD AUTO: 8.68 K/UL (ref 4.5–11)

## 2023-01-27 PROCEDURE — 82043 UR ALBUMIN QUANTITATIVE: CPT | Mod: ,,, | Performed by: CLINICAL MEDICAL LABORATORY

## 2023-01-27 PROCEDURE — 90471 FLU VACCINE (QUAD) GREATER THAN OR EQUAL TO 3YO PRESERVATIVE FREE IM: ICD-10-PCS | Mod: ,,, | Performed by: FAMILY MEDICINE

## 2023-01-27 PROCEDURE — 82570 MICROALBUMIN / CREATININE RATIO URINE: ICD-10-PCS | Mod: ,,, | Performed by: CLINICAL MEDICAL LABORATORY

## 2023-01-27 PROCEDURE — 3078F DIAST BP <80 MM HG: CPT | Mod: ,,, | Performed by: FAMILY MEDICINE

## 2023-01-27 PROCEDURE — 1160F RVW MEDS BY RX/DR IN RCRD: CPT | Mod: ,,, | Performed by: FAMILY MEDICINE

## 2023-01-27 PROCEDURE — 80061 LIPID PANEL: ICD-10-PCS | Mod: ,,, | Performed by: CLINICAL MEDICAL LABORATORY

## 2023-01-27 PROCEDURE — 80061 LIPID PANEL: CPT | Mod: ,,, | Performed by: CLINICAL MEDICAL LABORATORY

## 2023-01-27 PROCEDURE — 3074F SYST BP LT 130 MM HG: CPT | Mod: ,,, | Performed by: FAMILY MEDICINE

## 2023-01-27 PROCEDURE — 3074F PR MOST RECENT SYSTOLIC BLOOD PRESSURE < 130 MM HG: ICD-10-PCS | Mod: ,,, | Performed by: FAMILY MEDICINE

## 2023-01-27 PROCEDURE — 1160F PR REVIEW ALL MEDS BY PRESCRIBER/CLIN PHARMACIST DOCUMENTED: ICD-10-PCS | Mod: ,,, | Performed by: FAMILY MEDICINE

## 2023-01-27 PROCEDURE — 82043 MICROALBUMIN / CREATININE RATIO URINE: ICD-10-PCS | Mod: ,,, | Performed by: CLINICAL MEDICAL LABORATORY

## 2023-01-27 PROCEDURE — 83036 HEMOGLOBIN A1C: ICD-10-PCS | Mod: ,,, | Performed by: CLINICAL MEDICAL LABORATORY

## 2023-01-27 PROCEDURE — 1159F PR MEDICATION LIST DOCUMENTED IN MEDICAL RECORD: ICD-10-PCS | Mod: ,,, | Performed by: FAMILY MEDICINE

## 2023-01-27 PROCEDURE — 90686 FLU VACCINE (QUAD) GREATER THAN OR EQUAL TO 3YO PRESERVATIVE FREE IM: ICD-10-PCS | Mod: ,,, | Performed by: FAMILY MEDICINE

## 2023-01-27 PROCEDURE — 82570 ASSAY OF URINE CREATININE: CPT | Mod: ,,, | Performed by: CLINICAL MEDICAL LABORATORY

## 2023-01-27 PROCEDURE — 90686 IIV4 VACC NO PRSV 0.5 ML IM: CPT | Mod: ,,, | Performed by: FAMILY MEDICINE

## 2023-01-27 PROCEDURE — 80050 GENERAL HEALTH PANEL: CPT | Mod: ,,, | Performed by: CLINICAL MEDICAL LABORATORY

## 2023-01-27 PROCEDURE — 99214 PR OFFICE/OUTPT VISIT, EST, LEVL IV, 30-39 MIN: ICD-10-PCS | Mod: 25,,, | Performed by: FAMILY MEDICINE

## 2023-01-27 PROCEDURE — 83036 HEMOGLOBIN GLYCOSYLATED A1C: CPT | Mod: ,,, | Performed by: CLINICAL MEDICAL LABORATORY

## 2023-01-27 PROCEDURE — 3008F BODY MASS INDEX DOCD: CPT | Mod: ,,, | Performed by: FAMILY MEDICINE

## 2023-01-27 PROCEDURE — 3078F PR MOST RECENT DIASTOLIC BLOOD PRESSURE < 80 MM HG: ICD-10-PCS | Mod: ,,, | Performed by: FAMILY MEDICINE

## 2023-01-27 PROCEDURE — 3008F PR BODY MASS INDEX (BMI) DOCUMENTED: ICD-10-PCS | Mod: ,,, | Performed by: FAMILY MEDICINE

## 2023-01-27 PROCEDURE — 1159F MED LIST DOCD IN RCRD: CPT | Mod: ,,, | Performed by: FAMILY MEDICINE

## 2023-01-27 PROCEDURE — 80050 PR  GENERAL HEALTH PANEL: ICD-10-PCS | Mod: ,,, | Performed by: CLINICAL MEDICAL LABORATORY

## 2023-01-27 PROCEDURE — 99214 OFFICE O/P EST MOD 30 MIN: CPT | Mod: 25,,, | Performed by: FAMILY MEDICINE

## 2023-01-27 PROCEDURE — 90471 IMMUNIZATION ADMIN: CPT | Mod: ,,, | Performed by: FAMILY MEDICINE

## 2023-01-27 RX ORDER — METHOCARBAMOL 500 MG/1
500 TABLET, FILM COATED ORAL 3 TIMES DAILY PRN
Qty: 90 TABLET | Refills: 1 | Status: SHIPPED | OUTPATIENT
Start: 2023-01-27

## 2023-01-27 RX ORDER — LEVOTHYROXINE SODIUM 112 UG/1
112 TABLET ORAL DAILY
Qty: 90 TABLET | Refills: 1 | Status: SHIPPED | OUTPATIENT
Start: 2023-01-27 | End: 2023-07-17 | Stop reason: SDUPTHER

## 2023-01-27 RX ORDER — GABAPENTIN 300 MG/1
300 CAPSULE ORAL 2 TIMES DAILY
Qty: 180 CAPSULE | Refills: 1 | Status: SHIPPED | OUTPATIENT
Start: 2023-01-27 | End: 2023-10-20 | Stop reason: SDUPTHER

## 2023-01-27 RX ORDER — CITALOPRAM 40 MG/1
40 TABLET, FILM COATED ORAL DAILY
Qty: 90 TABLET | Refills: 1 | Status: SHIPPED | OUTPATIENT
Start: 2023-01-27 | End: 2023-07-17 | Stop reason: SDUPTHER

## 2023-01-27 RX ORDER — METFORMIN HYDROCHLORIDE 1000 MG/1
1000 TABLET ORAL 2 TIMES DAILY
Qty: 180 TABLET | Refills: 1 | Status: SHIPPED | OUTPATIENT
Start: 2023-01-27 | End: 2024-01-06 | Stop reason: SDUPTHER

## 2023-01-27 RX ORDER — SIMVASTATIN 10 MG/1
10 TABLET, FILM COATED ORAL NIGHTLY
Qty: 90 TABLET | Refills: 1 | Status: SHIPPED | OUTPATIENT
Start: 2023-01-27 | End: 2023-07-17 | Stop reason: SDUPTHER

## 2023-01-27 RX ORDER — CELECOXIB 200 MG/1
200 CAPSULE ORAL DAILY
Qty: 90 CAPSULE | Refills: 1 | Status: SHIPPED | OUTPATIENT
Start: 2023-01-27 | End: 2023-10-20 | Stop reason: SDUPTHER

## 2023-01-27 NOTE — PROGRESS NOTES
New Clinic Note    Ene Clemons is a 57 y.o. female     CC:   Chief Complaint   Patient presents with    Follow-up     Patient in for 6 month follow up.         Subjective    History of Present Illness HPI   Patient is here for evaluation of chronic medical problems. She needs refills. She is tolerating her medications well.     Current Outpatient Medications:     HYDROcodone-acetaminophen (NORCO) 7.5-325 mg per tablet, Take 1 tablet by mouth 2 (two) times daily as needed., Disp: , Rfl:     acyclovir (ZOVIRAX) 400 MG tablet, Take 1 tablet (400 mg total) by mouth 2 (two) times daily., Disp: 60 tablet, Rfl: 11    celecoxib (CELEBREX) 200 MG capsule, Take 1 capsule (200 mg total) by mouth once daily., Disp: 90 capsule, Rfl: 1    citalopram (CELEXA) 40 MG tablet, Take 1 tablet (40 mg total) by mouth once daily., Disp: 90 tablet, Rfl: 1    EUTHYROX 112 mcg tablet, Take 1 tablet (112 mcg total) by mouth once daily., Disp: 90 tablet, Rfl: 1    gabapentin (NEURONTIN) 300 MG capsule, Take 1 capsule (300 mg total) by mouth 2 (two) times a day., Disp: 180 capsule, Rfl: 1    metFORMIN (GLUCOPHAGE) 1000 MG tablet, Take 1 tablet (1,000 mg total) by mouth 2 (two) times daily., Disp: 180 tablet, Rfl: 1    methocarbamoL (ROBAXIN) 500 MG Tab, Take 1 tablet (500 mg total) by mouth 3 (three) times daily as needed., Disp: 90 tablet, Rfl: 1    simvastatin (ZOCOR) 10 MG tablet, Take 1 tablet (10 mg total) by mouth every evening., Disp: 90 tablet, Rfl: 1     Past Medical History:   Diagnosis Date    Arthritis     Depression     Hyperlipidemia     Hypothyroidism     Thyroid disease         Family History   Problem Relation Age of Onset    No Known Problems Mother     Arthritis Father     Heart disease Father     Cancer Father     No Known Problems Sister         Past Surgical History:   Procedure Laterality Date    ADENOIDECTOMY      CHOLECYSTECTOMY      HYSTERECTOMY      TONSILLECTOMY      TOTAL THYROIDECTOMY          Review of Systems  "  Constitutional:  Negative for fatigue and fever.   HENT:  Negative for ear pain, postnasal drip, rhinorrhea and sinus pressure/congestion.    Respiratory:  Negative for cough and shortness of breath.    Cardiovascular:  Negative for chest pain.   Gastrointestinal:  Negative for abdominal pain, diarrhea, nausea and vomiting.   Genitourinary:  Negative for dysuria.   Neurological:  Negative for headaches.      /76 (BP Location: Left arm, Patient Position: Sitting, BP Method: Large (Manual))   Pulse 72   Temp 98.4 °F (36.9 °C) (Oral)   Resp 17   Ht 5' 8" (1.727 m)   Wt 89 kg (196 lb 3.2 oz)   SpO2 95%   BMI 29.83 kg/m²      Physical Exam  HENT:      Head: Normocephalic and atraumatic.      Mouth/Throat:      Pharynx: Oropharynx is clear.   Cardiovascular:      Rate and Rhythm: Normal rate and regular rhythm.   Pulmonary:      Effort: Pulmonary effort is normal.      Breath sounds: Normal breath sounds.   Neurological:      Mental Status: She is alert and oriented to person, place, and time.   Psychiatric:         Mood and Affect: Mood normal.         Behavior: Behavior normal.        Assessment and Plan      ICD-10-CM ICD-9-CM   1. Type 2 diabetes mellitus without complication, with long-term current use of insulin  E11.9 250.00    Z79.4 V58.67   2. Acquired hypothyroidism  E03.9 244.9   3. Recurrent major depressive disorder, remission status unspecified  F33.9 296.30   4. Mixed hyperlipidemia  E78.2 272.2        Problem List Items Addressed This Visit          Psychiatric    Depression (Chronic)    Relevant Medications    citalopram (CELEXA) 40 MG tablet       Cardiac/Vascular    Hyperlipidemia (Chronic)    Relevant Medications    simvastatin (ZOCOR) 10 MG tablet       Endocrine    Type 2 diabetes mellitus without complication, with long-term current use of insulin - Primary (Chronic)    Relevant Medications    metFORMIN (GLUCOPHAGE) 1000 MG tablet    Other Relevant Orders    Comprehensive Metabolic " Panel    CBC Auto Differential    Lipid Panel    Hemoglobin A1C    Microalbumin/Creatinine Ratio, Urine    Ambulatory referral/consult to Optometry    Acquired hypothyroidism (Chronic)    Relevant Medications    EUTHYROX 112 mcg tablet    Other Relevant Orders    TSH         Follow up in about 6 months (around 7/27/2023), or if symptoms worsen or fail to improve.

## 2023-02-27 LAB
LEFT EYE DM RETINOPATHY: NEGATIVE
RIGHT EYE DM RETINOPATHY: NEGATIVE

## 2023-05-28 NOTE — PROGRESS NOTES
"New Clinic Note    Ene Clemons is a 56 y.o. female     CC:   Chief Complaint   Patient presents with    Sinusitis     Symptoms started 1.5 weeks ago. Stated her home Covid test was negative.     Headache    Cough     Took Amoxicillin for one week that she had gotten from [place]. Stated she took it because her phelgm was green and after taking this it went clear.     Ear Fullness    Diarrhea    Fatigue        Subjective  Patient complains of cough and congestion for 1 1/2 weeks. She has taken OTC meds and "left over" amoxicillin without relief. She denies fever. She complains of headache.     Presents to clinic for follow up on chronic health problems    Hypertension:    Takes medications as directed: yes  Checks blood pressure outside of clinic: yes  On a statin: yes  Cardiovascular exercise: no  Heart healthy diet: no    Diabetes, type 2:    Checks glucose outside of clinic:yes  Keeps log of glucoses: no  Eats a diabetic diet: no  Regular cardiovascular exercise: no  Monitors feet: yes  Most recent HbA1c values:   Hemoglobin A1C   Date Value Ref Range Status   07/28/2022 5.7 4.5 - 6.6 % Final     Comment:       Normal:               <5.7%  Pre-Diabetic:       5.7% to 6.4%  Diabetic:             >6.4%  Diabetic Goal:     <7%   01/28/2022 5.3 4.5 - 6.6 % Final     Comment:       Normal:               <5.7%  Pre-Diabetic:       5.7% to 6.4%  Diabetic:             >6.4%  Diabetic Goal:     <7%      Takes an aspirin: no  On a statin: yes  History of Present Illness HPI       Current Outpatient Medications:     celecoxib (CELEBREX) 200 MG capsule, Take 200 mg by mouth once daily., Disp: , Rfl:     citalopram (CELEXA) 40 MG tablet, Take 1 tablet (40 mg total) by mouth once daily., Disp: 90 tablet, Rfl: 1    EUTHYROX 112 mcg tablet, Take 1 tablet (112 mcg total) by mouth once daily., Disp: 90 tablet, Rfl: 1    gabapentin (NEURONTIN) 300 MG capsule, Take 300 mg by mouth 2 (two) times a day., Disp: , Rfl:     " "HYDROcodone-acetaminophen (NORCO) 7.5-325 mg per tablet, Take 1 tablet by mouth 2 (two) times daily as needed., Disp: , Rfl:     metFORMIN (GLUCOPHAGE) 1000 MG tablet, Take 1 tablet (1,000 mg total) by mouth 2 (two) times daily., Disp: 180 tablet, Rfl: 1    methocarbamoL (ROBAXIN) 500 MG Tab, Take 500 mg by mouth 3 (three) times daily as needed., Disp: , Rfl:     simvastatin (ZOCOR) 10 MG tablet, Take 1 tablet (10 mg total) by mouth every evening., Disp: 90 tablet, Rfl: 1    albuterol (VENTOLIN HFA) 90 mcg/actuation inhaler, Inhale 2 puffs into the lungs every 6 (six) hours as needed for Wheezing. Rescue, Disp: 18 g, Rfl: 0    azithromycin (ZITHROMAX Z-DELMY) 250 MG tablet, Take 2 tabs on Day 1. Take 1 tab on day 2-5, Disp: 6 tablet, Rfl: 0  No current facility-administered medications for this visit.     Past Medical History:   Diagnosis Date    Arthritis     Depression     Hyperlipidemia     Hypothyroidism     Thyroid disease         Family History   Problem Relation Age of Onset    No Known Problems Mother     Arthritis Father     Heart disease Father     Cancer Father     No Known Problems Sister         Past Surgical History:   Procedure Laterality Date    ADENOIDECTOMY      CHOLECYSTECTOMY      HYSTERECTOMY      TONSILLECTOMY          Review of Systems   Constitutional: Negative for fatigue and fever.   HENT: Positive for nasal congestion, postnasal drip, rhinorrhea and sinus pressure/congestion. Negative for ear pain.    Respiratory: Positive for cough. Negative for shortness of breath.    Cardiovascular: Negative for chest pain.   Gastrointestinal: Positive for diarrhea. Negative for abdominal pain, nausea and vomiting.   Genitourinary: Negative for dysuria.   Neurological: Positive for headaches.        /85 (BP Location: Left arm, Patient Position: Sitting, BP Method: Large (Automatic))   Pulse 88   Temp 99.1 °F (37.3 °C) (Oral)   Resp 18   Ht 5' 8" (1.727 m)   Wt 87.1 kg (192 " lb)   SpO2 95%   BMI 29.19 kg/m²      Physical Exam  HENT:      Head: Normocephalic and atraumatic.   Cardiovascular:      Rate and Rhythm: Normal rate and regular rhythm.   Pulmonary:      Effort: Pulmonary effort is normal.      Breath sounds: Rhonchi present.   Neurological:      Mental Status: She is alert and oriented to person, place, and time.   Psychiatric:         Mood and Affect: Mood normal.         Behavior: Behavior normal.          Assessment and Plan      ICD-10-CM ICD-9-CM   1. Bronchitis  J40 490   2. Cough with exposure to COVID-19 virus  R05.8 786.2    Z20.822 V01.79   3. Cough  R05.9 786.2        Problem List Items Addressed This Visit    None     Visit Diagnoses     Bronchitis    -  Primary    Relevant Medications    albuterol (VENTOLIN HFA) 90 mcg/actuation inhaler    methylPREDNISolone acetate injection 40 mg (Completed)    dexamethasone injection 4 mg (Completed)    azithromycin (ZITHROMAX Z-DELMY) 250 MG tablet    Cough with exposure to COVID-19 virus        Relevant Orders    POCT SARS-COV2 (COVID) with Flu Antigen (Completed)    Cough        Relevant Orders    X-Ray Chest PA And Lateral (Completed)           Patient Instructions   1. Take medications as directed  2. Monitor temperature, if fever begins, tylenol or ibuprofen can be used as long as you have no history of abnormal reactions to these medications. Follow the instructions on the bottle or contact clinic with questions.   3. Please contact clinic if symptoms begin to get worse.   4. Report to the ER if you feel your symptoms are severe and life threatening.         Follow up if symptoms worsen or fail to improve.          [id number]

## 2023-07-17 ENCOUNTER — OFFICE VISIT (OUTPATIENT)
Dept: FAMILY MEDICINE | Facility: CLINIC | Age: 58
End: 2023-07-17
Payer: COMMERCIAL

## 2023-07-17 VITALS
HEART RATE: 64 BPM | SYSTOLIC BLOOD PRESSURE: 123 MMHG | DIASTOLIC BLOOD PRESSURE: 83 MMHG | HEIGHT: 68 IN | TEMPERATURE: 98 F | RESPIRATION RATE: 18 BRPM | OXYGEN SATURATION: 98 % | BODY MASS INDEX: 30.01 KG/M2 | WEIGHT: 198 LBS

## 2023-07-17 DIAGNOSIS — F33.9 RECURRENT MAJOR DEPRESSIVE DISORDER, REMISSION STATUS UNSPECIFIED: ICD-10-CM

## 2023-07-17 DIAGNOSIS — E03.9 ACQUIRED HYPOTHYROIDISM: ICD-10-CM

## 2023-07-17 DIAGNOSIS — Z79.4 TYPE 2 DIABETES MELLITUS WITHOUT COMPLICATION, WITH LONG-TERM CURRENT USE OF INSULIN: Primary | Chronic | ICD-10-CM

## 2023-07-17 DIAGNOSIS — E78.2 MIXED HYPERLIPIDEMIA: ICD-10-CM

## 2023-07-17 DIAGNOSIS — E11.9 TYPE 2 DIABETES MELLITUS WITHOUT COMPLICATION, WITH LONG-TERM CURRENT USE OF INSULIN: Primary | Chronic | ICD-10-CM

## 2023-07-17 LAB
CHOLEST SERPL-MCNC: 201 MG/DL (ref 0–200)
CHOLEST/HDLC SERPL: 3.3 {RATIO}
EST. AVERAGE GLUCOSE BLD GHB EST-MCNC: 124 MG/DL
HBA1C MFR BLD HPLC: 6.3 % (ref 4.5–6.6)
HDLC SERPL-MCNC: 61 MG/DL (ref 40–60)
LDLC SERPL CALC-MCNC: 109 MG/DL
LDLC/HDLC SERPL: 1.8 {RATIO}
NONHDLC SERPL-MCNC: 140 MG/DL
TRIGL SERPL-MCNC: 154 MG/DL (ref 35–150)
TSH SERPL DL<=0.005 MIU/L-ACNC: 4.52 UIU/ML (ref 0.36–3.74)
VLDLC SERPL-MCNC: 31 MG/DL

## 2023-07-17 PROCEDURE — 3066F PR DOCUMENTATION OF TREATMENT FOR NEPHROPATHY: ICD-10-PCS | Mod: ,,, | Performed by: FAMILY MEDICINE

## 2023-07-17 PROCEDURE — 1160F PR REVIEW ALL MEDS BY PRESCRIBER/CLIN PHARMACIST DOCUMENTED: ICD-10-PCS | Mod: ,,, | Performed by: FAMILY MEDICINE

## 2023-07-17 PROCEDURE — 3074F PR MOST RECENT SYSTOLIC BLOOD PRESSURE < 130 MM HG: ICD-10-PCS | Mod: ,,, | Performed by: FAMILY MEDICINE

## 2023-07-17 PROCEDURE — 3044F HG A1C LEVEL LT 7.0%: CPT | Mod: ,,, | Performed by: FAMILY MEDICINE

## 2023-07-17 PROCEDURE — 99214 PR OFFICE/OUTPT VISIT, EST, LEVL IV, 30-39 MIN: ICD-10-PCS | Mod: ,,, | Performed by: FAMILY MEDICINE

## 2023-07-17 PROCEDURE — 3066F NEPHROPATHY DOC TX: CPT | Mod: ,,, | Performed by: FAMILY MEDICINE

## 2023-07-17 PROCEDURE — 3008F PR BODY MASS INDEX (BMI) DOCUMENTED: ICD-10-PCS | Mod: ,,, | Performed by: FAMILY MEDICINE

## 2023-07-17 PROCEDURE — 1160F RVW MEDS BY RX/DR IN RCRD: CPT | Mod: ,,, | Performed by: FAMILY MEDICINE

## 2023-07-17 PROCEDURE — 3079F PR MOST RECENT DIASTOLIC BLOOD PRESSURE 80-89 MM HG: ICD-10-PCS | Mod: ,,, | Performed by: FAMILY MEDICINE

## 2023-07-17 PROCEDURE — 3074F SYST BP LT 130 MM HG: CPT | Mod: ,,, | Performed by: FAMILY MEDICINE

## 2023-07-17 PROCEDURE — 3044F PR MOST RECENT HEMOGLOBIN A1C LEVEL <7.0%: ICD-10-PCS | Mod: ,,, | Performed by: FAMILY MEDICINE

## 2023-07-17 PROCEDURE — 3008F BODY MASS INDEX DOCD: CPT | Mod: ,,, | Performed by: FAMILY MEDICINE

## 2023-07-17 PROCEDURE — 3061F NEG MICROALBUMINURIA REV: CPT | Mod: ,,, | Performed by: FAMILY MEDICINE

## 2023-07-17 PROCEDURE — 83036 HEMOGLOBIN GLYCOSYLATED A1C: CPT | Mod: ,,, | Performed by: CLINICAL MEDICAL LABORATORY

## 2023-07-17 PROCEDURE — 1159F MED LIST DOCD IN RCRD: CPT | Mod: ,,, | Performed by: FAMILY MEDICINE

## 2023-07-17 PROCEDURE — 99214 OFFICE O/P EST MOD 30 MIN: CPT | Mod: ,,, | Performed by: FAMILY MEDICINE

## 2023-07-17 PROCEDURE — 83036 HEMOGLOBIN A1C: ICD-10-PCS | Mod: ,,, | Performed by: CLINICAL MEDICAL LABORATORY

## 2023-07-17 PROCEDURE — 80061 LIPID PANEL: CPT | Mod: ,,, | Performed by: CLINICAL MEDICAL LABORATORY

## 2023-07-17 PROCEDURE — 84443 ASSAY THYROID STIM HORMONE: CPT | Mod: ,,, | Performed by: CLINICAL MEDICAL LABORATORY

## 2023-07-17 PROCEDURE — 1159F PR MEDICATION LIST DOCUMENTED IN MEDICAL RECORD: ICD-10-PCS | Mod: ,,, | Performed by: FAMILY MEDICINE

## 2023-07-17 PROCEDURE — 80061 LIPID PANEL: ICD-10-PCS | Mod: ,,, | Performed by: CLINICAL MEDICAL LABORATORY

## 2023-07-17 PROCEDURE — 3079F DIAST BP 80-89 MM HG: CPT | Mod: ,,, | Performed by: FAMILY MEDICINE

## 2023-07-17 PROCEDURE — 3061F PR NEG MICROALBUMINURIA RESULT DOCUMENTED/REVIEW: ICD-10-PCS | Mod: ,,, | Performed by: FAMILY MEDICINE

## 2023-07-17 PROCEDURE — 84443 TSH: ICD-10-PCS | Mod: ,,, | Performed by: CLINICAL MEDICAL LABORATORY

## 2023-07-17 RX ORDER — LEVOTHYROXINE SODIUM 112 UG/1
112 TABLET ORAL DAILY
Qty: 90 TABLET | Refills: 1 | Status: SHIPPED | OUTPATIENT
Start: 2023-07-17 | End: 2023-10-20 | Stop reason: SDUPTHER

## 2023-07-17 RX ORDER — SIMVASTATIN 10 MG/1
10 TABLET, FILM COATED ORAL NIGHTLY
Qty: 90 TABLET | Refills: 1 | Status: SHIPPED | OUTPATIENT
Start: 2023-07-17 | End: 2023-10-20 | Stop reason: SDUPTHER

## 2023-07-17 RX ORDER — CITALOPRAM 40 MG/1
40 TABLET, FILM COATED ORAL DAILY
Qty: 90 TABLET | Refills: 1 | Status: SHIPPED | OUTPATIENT
Start: 2023-07-17 | End: 2023-10-20 | Stop reason: SDUPTHER

## 2023-07-17 NOTE — PROGRESS NOTES
New Clinic Note    Ene Clemons is a 57 y.o. female     CC:   Chief Complaint   Patient presents with    Annual Exam     Patient stated she is here for a general 6 month check up, needs refills sent to [facility] and is not fasting for labs.     Diabetes    Hyperlipidemia    Hypothyroidism    Medication Refill        Subjective    History of Present Illness   Patient is for evaluation of chronic medical problems. Patient needs refills. Ene  is tolerating medications well without side effects.       Current Outpatient Medications:     acyclovir (ZOVIRAX) 400 MG tablet, Take 1 tablet (400 mg total) by mouth 2 (two) times daily., Disp: 60 tablet, Rfl: 11    celecoxib (CELEBREX) 200 MG capsule, Take 1 capsule (200 mg total) by mouth once daily., Disp: 90 capsule, Rfl: 1    gabapentin (NEURONTIN) 300 MG capsule, Take 1 capsule (300 mg total) by mouth 2 (two) times a day., Disp: 180 capsule, Rfl: 1    HYDROcodone-acetaminophen (NORCO) 7.5-325 mg per tablet, Take 1 tablet by mouth 2 (two) times daily as needed., Disp: , Rfl:     metFORMIN (GLUCOPHAGE) 1000 MG tablet, Take 1 tablet (1,000 mg total) by mouth 2 (two) times daily., Disp: 180 tablet, Rfl: 1    methocarbamoL (ROBAXIN) 500 MG Tab, Take 1 tablet (500 mg total) by mouth 3 (three) times daily as needed., Disp: 90 tablet, Rfl: 1    citalopram (CELEXA) 40 MG tablet, Take 1 tablet (40 mg total) by mouth once daily., Disp: 90 tablet, Rfl: 1    EUTHYROX 112 mcg tablet, Take 1 tablet (112 mcg total) by mouth once daily., Disp: 90 tablet, Rfl: 1    simvastatin (ZOCOR) 10 MG tablet, Take 1 tablet (10 mg total) by mouth every evening., Disp: 90 tablet, Rfl: 1     Past Medical History:   Diagnosis Date    Arthritis     Depression     Hyperlipidemia     Hypothyroidism     Thyroid disease         Family History   Problem Relation Age of Onset    No Known Problems Mother     Arthritis Father     Heart disease Father     Cancer Father     No Known Problems Sister      "    Past Surgical History:   Procedure Laterality Date    ADENOIDECTOMY      CHOLECYSTECTOMY      HYSTERECTOMY      TONSILLECTOMY      TOTAL THYROIDECTOMY          Review of Systems   Constitutional:  Negative for fatigue and fever.   HENT:  Negative for ear pain, postnasal drip, rhinorrhea and sinus pressure/congestion.    Respiratory:  Negative for cough and shortness of breath.    Cardiovascular:  Negative for chest pain.   Gastrointestinal:  Negative for abdominal pain, diarrhea, nausea and vomiting.   Genitourinary:  Negative for dysuria.   Neurological:  Negative for headaches.      /83 (BP Location: Left arm, Patient Position: Sitting, BP Method: Large (Automatic))   Pulse 64   Temp 98.4 °F (36.9 °C) (Oral)   Resp 18   Ht 5' 8" (1.727 m)   Wt 89.8 kg (198 lb)   SpO2 98%   BMI 30.11 kg/m²      Physical Exam  HENT:      Head: Normocephalic and atraumatic.   Cardiovascular:      Rate and Rhythm: Normal rate and regular rhythm.   Pulmonary:      Effort: Pulmonary effort is normal.      Breath sounds: Normal breath sounds.   Neurological:      Mental Status: She is alert and oriented to person, place, and time.   Psychiatric:         Mood and Affect: Mood normal.         Behavior: Behavior normal.        Assessment and Plan      ICD-10-CM ICD-9-CM   1. Type 2 diabetes mellitus without complication, with long-term current use of insulin  E11.9 250.00    Z79.4 V58.67   2. Recurrent major depressive disorder, remission status unspecified  F33.9 296.30   3. Acquired hypothyroidism  E03.9 244.9   4. Mixed hyperlipidemia  E78.2 272.2   5. BMI 30.0-30.9,adult  Z68.30 V85.30        1. Type 2 diabetes mellitus without complication, with long-term current use of insulin  The current medical regimen is effective;  continue present plan and medications.  -     Hemoglobin A1C; Future; Expected date: 07/17/2023    2. Recurrent major depressive disorder, remission status unspecified  The current medical regimen is " effective;  continue present plan and medications.  -     citalopram (CELEXA) 40 MG tablet; Take 1 tablet (40 mg total) by mouth once daily.  Dispense: 90 tablet; Refill: 1    3. Acquired hypothyroidism  The current medical regimen is effective;  continue present plan and medications.  -     EUTHYROX 112 mcg tablet; Take 1 tablet (112 mcg total) by mouth once daily.  Dispense: 90 tablet; Refill: 1  -     TSH; Future; Expected date: 07/17/2023    4. Mixed hyperlipidemia  The current medical regimen is effective;  continue present plan and medications.  -     simvastatin (ZOCOR) 10 MG tablet; Take 1 tablet (10 mg total) by mouth every evening.  Dispense: 90 tablet; Refill: 1  -     Lipid Panel; Future; Expected date: 07/17/2023    5. BMI 30.0-30.9,adult  Diet and educational handouts given. Patient has been unable to keep her weight down without ozempic. She would like to try this again. Will recheck A1C today.          Follow up in about 6 months (around 1/17/2024).

## 2023-09-27 ENCOUNTER — PATIENT MESSAGE (OUTPATIENT)
Dept: FAMILY MEDICINE | Facility: CLINIC | Age: 58
End: 2023-09-27
Payer: COMMERCIAL

## 2023-10-12 ENCOUNTER — PATIENT MESSAGE (OUTPATIENT)
Dept: FAMILY MEDICINE | Facility: CLINIC | Age: 58
End: 2023-10-12
Payer: COMMERCIAL

## 2023-10-12 DIAGNOSIS — M54.50 LOW BACK PAIN, UNSPECIFIED BACK PAIN LATERALITY, UNSPECIFIED CHRONICITY, UNSPECIFIED WHETHER SCIATICA PRESENT: ICD-10-CM

## 2023-10-12 DIAGNOSIS — G89.4 CHRONIC PAIN SYNDROME: Primary | ICD-10-CM

## 2023-10-12 NOTE — TELEPHONE ENCOUNTER
Mackenzie Luque we have been trying to play catch up after Dr. Hargrove was out of the office. DR. Hargrove said it is ok to refer to Dr. Neff and I am putting an order in to have it scheduled now.

## 2023-10-19 ENCOUNTER — PATIENT MESSAGE (OUTPATIENT)
Dept: FAMILY MEDICINE | Facility: CLINIC | Age: 58
End: 2023-10-19
Payer: COMMERCIAL

## 2023-10-19 DIAGNOSIS — Z13.1 ENCOUNTER FOR SCREENING FOR DIABETES MELLITUS: ICD-10-CM

## 2023-10-19 DIAGNOSIS — Z13.220 ENCOUNTER FOR SCREENING FOR LIPID DISORDER: ICD-10-CM

## 2023-10-19 DIAGNOSIS — E11.9 TYPE 2 DIABETES MELLITUS WITHOUT COMPLICATION, WITH LONG-TERM CURRENT USE OF INSULIN: Primary | Chronic | ICD-10-CM

## 2023-10-19 DIAGNOSIS — Z79.4 TYPE 2 DIABETES MELLITUS WITHOUT COMPLICATION, WITH LONG-TERM CURRENT USE OF INSULIN: Primary | Chronic | ICD-10-CM

## 2023-10-20 ENCOUNTER — OFFICE VISIT (OUTPATIENT)
Dept: FAMILY MEDICINE | Facility: CLINIC | Age: 58
End: 2023-10-20
Payer: COMMERCIAL

## 2023-10-20 VITALS
WEIGHT: 196.19 LBS | TEMPERATURE: 99 F | BODY MASS INDEX: 29.73 KG/M2 | OXYGEN SATURATION: 97 % | SYSTOLIC BLOOD PRESSURE: 138 MMHG | HEART RATE: 93 BPM | RESPIRATION RATE: 16 BRPM | DIASTOLIC BLOOD PRESSURE: 89 MMHG | HEIGHT: 68 IN

## 2023-10-20 DIAGNOSIS — K21.9 GASTROESOPHAGEAL REFLUX DISEASE, UNSPECIFIED WHETHER ESOPHAGITIS PRESENT: ICD-10-CM

## 2023-10-20 DIAGNOSIS — Z23 ENCOUNTER FOR IMMUNIZATION: ICD-10-CM

## 2023-10-20 DIAGNOSIS — R73.01 IMPAIRED FASTING GLUCOSE: ICD-10-CM

## 2023-10-20 DIAGNOSIS — E66.3 OVERWEIGHT WITH BODY MASS INDEX (BMI) OF 29 TO 29.9 IN ADULT: ICD-10-CM

## 2023-10-20 DIAGNOSIS — E78.2 MIXED HYPERLIPIDEMIA: ICD-10-CM

## 2023-10-20 DIAGNOSIS — E03.9 ACQUIRED HYPOTHYROIDISM: ICD-10-CM

## 2023-10-20 DIAGNOSIS — M54.16 LUMBAR RADICULOPATHY: ICD-10-CM

## 2023-10-20 DIAGNOSIS — F33.9 RECURRENT MAJOR DEPRESSIVE DISORDER, REMISSION STATUS UNSPECIFIED: ICD-10-CM

## 2023-10-20 DIAGNOSIS — E11.9 TYPE 2 DIABETES MELLITUS WITHOUT COMPLICATION, WITH LONG-TERM CURRENT USE OF INSULIN: ICD-10-CM

## 2023-10-20 DIAGNOSIS — Z79.4 TYPE 2 DIABETES MELLITUS WITHOUT COMPLICATION, WITH LONG-TERM CURRENT USE OF INSULIN: ICD-10-CM

## 2023-10-20 DIAGNOSIS — Z00.01 ANNUAL VISIT FOR GENERAL ADULT MEDICAL EXAMINATION WITH ABNORMAL FINDINGS: Primary | ICD-10-CM

## 2023-10-20 PROCEDURE — 90686 IIV4 VACC NO PRSV 0.5 ML IM: CPT | Mod: ,,, | Performed by: FAMILY MEDICINE

## 2023-10-20 PROCEDURE — 90471 FLU VACCINE (QUAD) GREATER THAN OR EQUAL TO 3YO PRESERVATIVE FREE IM: ICD-10-PCS | Mod: ,,, | Performed by: FAMILY MEDICINE

## 2023-10-20 PROCEDURE — 90472 PR IMMUNIZ,ADMIN,EACH ADDL: ICD-10-PCS | Mod: ,,, | Performed by: FAMILY MEDICINE

## 2023-10-20 PROCEDURE — 99396 PR PREVENTIVE VISIT,EST,40-64: ICD-10-PCS | Mod: 25,,, | Performed by: FAMILY MEDICINE

## 2023-10-20 PROCEDURE — 99396 PREV VISIT EST AGE 40-64: CPT | Mod: 25,,, | Performed by: FAMILY MEDICINE

## 2023-10-20 PROCEDURE — 90677 PR PNEUMOCOCCAL CONJ VACCINE, 20 VALENT, IM: ICD-10-PCS | Mod: ,,, | Performed by: FAMILY MEDICINE

## 2023-10-20 PROCEDURE — 90471 IMMUNIZATION ADMIN: CPT | Mod: ,,, | Performed by: FAMILY MEDICINE

## 2023-10-20 PROCEDURE — 90677 PCV20 VACCINE IM: CPT | Mod: ,,, | Performed by: FAMILY MEDICINE

## 2023-10-20 PROCEDURE — 90686 FLU VACCINE (QUAD) GREATER THAN OR EQUAL TO 3YO PRESERVATIVE FREE IM: ICD-10-PCS | Mod: ,,, | Performed by: FAMILY MEDICINE

## 2023-10-20 PROCEDURE — 90472 IMMUNIZATION ADMIN EACH ADD: CPT | Mod: ,,, | Performed by: FAMILY MEDICINE

## 2023-10-20 RX ORDER — SIMVASTATIN 10 MG/1
10 TABLET, FILM COATED ORAL NIGHTLY
Qty: 90 TABLET | Refills: 1 | Status: SHIPPED | OUTPATIENT
Start: 2023-10-20 | End: 2024-10-19

## 2023-10-20 RX ORDER — CELECOXIB 200 MG/1
200 CAPSULE ORAL DAILY
Qty: 90 CAPSULE | Refills: 1 | Status: SHIPPED | OUTPATIENT
Start: 2023-10-20 | End: 2024-01-06 | Stop reason: SDUPTHER

## 2023-10-20 RX ORDER — OMEPRAZOLE 40 MG/1
40 CAPSULE, DELAYED RELEASE ORAL DAILY
Qty: 30 CAPSULE | Refills: 11 | Status: SHIPPED | OUTPATIENT
Start: 2023-10-20 | End: 2024-01-11 | Stop reason: SDUPTHER

## 2023-10-20 RX ORDER — CITALOPRAM 40 MG/1
40 TABLET, FILM COATED ORAL DAILY
Qty: 90 TABLET | Refills: 1 | Status: SHIPPED | OUTPATIENT
Start: 2023-10-20

## 2023-10-20 RX ORDER — GABAPENTIN 300 MG/1
300 CAPSULE ORAL 2 TIMES DAILY
Qty: 180 CAPSULE | Refills: 1 | Status: SHIPPED | OUTPATIENT
Start: 2023-10-20 | End: 2024-01-06 | Stop reason: SDUPTHER

## 2023-10-20 RX ORDER — LEVOTHYROXINE SODIUM 112 UG/1
112 TABLET ORAL DAILY
Qty: 90 TABLET | Refills: 1 | Status: SHIPPED | OUTPATIENT
Start: 2023-10-20 | End: 2024-01-06 | Stop reason: SDUPTHER

## 2023-10-20 NOTE — LETTER
AUTHORIZATION FOR RELEASE OF   CONFIDENTIAL INFORMATION    Dear Dr. Rell Lowery,    We are seeing Ene Clemons, date of birth 1965, in the clinic at [facility]. Lawanda Hargrove MD is the patient's PCP. Ene Clemons has an outstanding lab/procedure at the time we reviewed her chart. In order to help keep her health information updated, she has authorized us to request the following medical record(s):        (  )  MAMMOGRAM                                      (  )  COLONOSCOPY      (  )  PAP SMEAR                                          (  )  OUTSIDE LAB RESULTS     (  )  DEXA SCAN                                          ( x )  EYE EXAM            (  )  FOOT EXAM                                          (  )  ENTIRE RECORD     (  )  OUTSIDE IMMUNIZATIONS                 (  )  _______________         Please fax records to [facility], [place], Krista L., MD, [phone].     If you have any questions, please contact [facility] at [phone].          Patient Name: Ene Clemons  : 1965  Patient Phone #: [phone]

## 2023-10-20 NOTE — PATIENT INSTRUCTIONS
"Patient Education       Type 2 Diabetes   The Basics   Written by the doctors and editors at [facility]   What is type 2 diabetes? -- Type 2 diabetes is a disorder that disrupts the way your body uses sugar. It is sometimes called type 2 diabetes mellitus.  All the cells in your body need sugar to work normally. Sugar gets into the cells with the help of a hormone called insulin. Insulin is made by the pancreas, an organ in the belly. If there is not enough insulin, or if your body stops responding to insulin, sugar builds up in the blood. That is what happens to people with diabetes.  There are two different types of diabetes:   Type 1 diabetes - In type 1 diabetes, the pancreas does not make insulin or makes very little insulin.  Type 2 diabetes - In most people with type 2 diabetes, the body stops responding to insulin normally. Then, over time, the pancreas stops making enough insulin.   Being overweight or obese increases a person's risk of developing type 2 diabetes. But people who are not overweight can get diabetes, too.  What are the symptoms of type 2 diabetes? -- Type 2 diabetes usually causes no symptoms. When symptoms do occur, they include:  Needing to urinate often  Intense thirst  Blurry vision  Can diabetes lead to other health problems? -- Yes. Type 2 diabetes might not make you feel sick. But if it is not managed, it can lead to serious problems over time, such as:  Heart attacks  Strokes  Kidney disease  Vision problems (or even blindness)  Pain or loss of feeling in the hands and feet  Needing to have fingers, toes, or other body parts removed (amputated)  How do I know if I have type 2 diabetes? -- To find out if you have type 2 diabetes, your doctor or nurse can do a blood test. There are 2 tests that can be used for this. Both involve measuring the amount of sugar in your blood, called your "blood sugar" or "blood glucose":   One of the tests measures your blood sugar at the time the blood " "sample is taken. This test is done in the morning. You can't eat or drink anything except water for at least 8 hours before the test.   The other test shows what your average blood sugar has been for the past 2 to 3 months. This blood test is called "hemoglobin A1C" or just "A1C." It can be checked at any time of the day, even if you have recently eaten.  How is type 2 diabetes treated? -- The goals of treatment are to control your blood sugar and lower the risk of future problems that can happen in people with diabetes. An important part of managing diabetes is to eat healthy foods and get plenty of physical activity.  There are a few medicines that help control blood sugar. Some people need to take pills that help the body make more insulin or that help insulin do its job. Others need insulin shots.  Depending on what medicines you take, you might need to check your blood sugar regularly at home. But not everyone with type 2 diabetes needs to do this. Your doctor or nurse will tell you if you should be checking your blood sugar, and when and how to do this.  Sometimes, people with type 2 diabetes also need medicines to reduce the problems caused by the disease. For instance, medicines used to lower blood pressure can reduce the chances of a heart attack or stroke.  It's also important to get certain vaccines, such as vaccines to protect against the flu and coronavirus disease 2019 (COVID-19). Some people also need a vaccine to prevent pneumonia.  Can type 2 diabetes be prevented? -- Yes. To lower your chances of getting type 2 diabetes, the most important thing you can do is eat a healthy diet and get plenty of physical activity. This can help you lose weight if you are overweight. But eating well and being active are also good for your overall health. Even gentle activity, like walking, has benefits.  If you smoke, quitting can also lower your risk of type 2 diabetes. Quitting smoking can be hard to do, but your " doctor or nurse can help.  All topics are updated as new evidence becomes available and our peer review process is complete.  This topic retrieved from [employer] on: Sep 21, 2021.  Topic 38834 Version 14.0  Release: 29.4.2 - C29.263  © 2021 UpToDate, Inc. and/or its affiliates. All rights reserved.  Consumer Information Use and Disclaimer   This information is not specific medical advice and does not replace information you receive from your health care provider. This is only a brief summary of general information. It does NOT include all information about conditions, illnesses, injuries, tests, procedures, treatments, therapies, discharge instructions or life-style choices that may apply to you. You must talk with your health care provider for complete information about your health and treatment options. This information should not be used to decide whether or not to accept your health care provider's advice, instructions or recommendations. Only your health care provider has the knowledge and training to provide advice that is right for you. The use of this information is governed by the [employer] End User License Agreement, available at https://www.[employer]/en/solutions/[employer]/about/cyrus.The use of [employer] content is governed by the [employer] Terms of Use. ©2021 UpToDate, Inc. All rights reserved.  Copyright   © 2021 UpToDate, Inc. and/or its affiliates. All rights reserved.    Patient Education       Diet and Health   The Basics   Written by the doctors and editors at [employer]   Why is it important to eat a healthy diet? -- It's important to eat a healthy diet because eating the right foods can keep you healthy now and later on in life.  Which foods are especially healthy? -- Foods that are especially healthy include:  Fruits and vegetables - Eating a diet with lots of fruits and vegetables can help prevent heart disease and strokes. It might also help prevent certain types of cancers. Try to eat fruits  "and vegetables at each meal and also for snacks. If you don't have fresh fruits and vegetables available, you can eat frozen or canned ones instead. Doctors recommend eating at least 2 1/2 servings of vegetables and 2 servings of fruits each day.  Foods with fiber - Eating foods with a lot of fiber can help prevent heart disease and strokes. If you have type 2 diabetes, it can also help control your blood sugar. Foods that have a lot of fiber include vegetables, fruits, beans, nuts, oatmeal, and whole grain breads and cereals. You can tell how much fiber is in a food by reading the nutrition label (figure 1). Doctors recommend eating 25 to 36 grams of fiber each day.  Foods with folate (also called folic acid) - Folate is a vitamin that is important for pregnant people, since it helps prevent certain birth defects. Anyone who could get pregnant should get at least 400 micrograms of folic acid daily, whether or not they are actively trying to get pregnant. Folate is found in many breakfast cereals, oranges, orange juice, and green leafy vegetables.  Foods with calcium and vitamin D - Babies, children, and adults need calcium and vitamin D to help keep their bones strong. Adults also need calcium and vitamin D to help prevent osteoporosis. Osteoporosis is a condition that causes bones to get "thin" and break more easily than usual. Different foods and drinks have calcium and vitamin D in them (figure 2). People who don't get enough calcium and vitamin D in their diet might need to take a supplement.  Foods with protein - Protein helps your muscles stay strong. Healthy foods with a lot of protein include chicken, fish, eggs, beans, nuts, and soy products. Red meat also has a lot of protein, but it also contains fats, which can be unhealthy.  Some experts recommend a "Mediterranean diet." This involves eating a lot of fruits, vegetables, nuts, whole grains, and olive oil. It also includes some fish, poultry, and dairy " "products, but not a lot of red meat. Eating this way can help your overall health, and might even lower your risk of having a stroke.  What foods should I avoid or limit? -- To eat a healthy diet, there are some things you should avoid or limit. They include:   Fats - There are different types of fats. Some types of fats are better for your body than others.  Trans fats are especially unhealthy. They are found in margarines, many fast foods, and some store-bought baked goods. Trans fats can raise your cholesterol level and your increase your chance of getting heart disease. You should avoid eating foods with these types of fats.  The type of "polyunsaturated" fats found in fish seems to be healthy and can reduce your chance of getting heart disease. Other polyunsaturated fats might also be good for your health. When you cook, it's best to use oils with healthier fats, such as olive oil and canola oil.  Sugar - To have a healthy diet, it's important to limit or avoid sugar, sweets, and refined grains. Refined grains are found in white bread, white rice, most forms of pasta, and most packaged "snack" foods. Whole grains, such as whole-wheat bread and brown rice, have more fiber and are better for your health.  Avoiding sugar-sweetened beverages, like soda and sports drinks, can also help improve your health.  Red meat - Studies have shown that eating a lot of red meat can increase your risk of certain health problems, including heart disease and cancer. You should limit the amount of red meat that you eat.  Can I drink alcohol as part of a healthy diet? -- People who drink a small amount of alcohol each day might have a lower chance of getting heart disease. But drinking alcohol can lead to problems. For example, it can raise a person's chances of getting liver disease and certain types of cancers. In women, even 1 drink a day can increase the risk of getting breast cancer.  Most doctors recommend that adult women not " "have more than 1 drink a day and that adult men not have more than 2 drinks a day. The limits are different because most women's bodies take longer to break down alcohol.  How many calories do I need each day? -- The number of calories you need each day depends on your weight, height, age, sex, and how active you are.  Your doctor or nurse can tell you how many calories you should eat each day. If you are trying to lose weight, you should eat fewer calories each day.  What if I have questions? -- If you have questions about which foods you should or should not eat, ask your doctor or nurse. The right diet for you will depend, in part, on your health and any medical conditions you have.  All topics are updated as new evidence becomes available and our peer review process is complete.  This topic retrieved from [employer] on: Sep 21, 2021.  Topic 93387 Version 20.0  Release: 29.4.2 - C29.263  © 2021 UpToDate, Inc. and/or its affiliates. All rights reserved.  figure 1: Nutrition label - fiber     This is an example of a nutrition label. To figure out how much fiber is in a food, look for the line that says "Dietary Fiber." It's also important to look at the serving size. This food has 7 grams of fiber in each serving, and each serving is 1 cup.  Graphic 28562 Version 7.0    figure 2: Foods and drinks with calcium and vitamin D     Foods rich in calcium include ice cream, soy milk, breads, kale, broccoli, milk, cheese, cottage cheese, almonds, yogurt, ready-to-eat cereals, beans, and tofu. Foods rich in vitamin D include milk, fortified plant-based "milks" (soy, almond), canned tuna fish, cod liver oil, yogurt, ready-to-eat-cereals, cooked salmon, canned sardines, mackerel, and eggs. Some of these foods are rich in both.  Graphic 42667 Version 4.0    Consumer Information Use and Disclaimer   This information is not specific medical advice and does not replace information you receive from your health care provider. This is " only a brief summary of general information. It does NOT include all information about conditions, illnesses, injuries, tests, procedures, treatments, therapies, discharge instructions or life-style choices that may apply to you. You must talk with your health care provider for complete information about your health and treatment options. This information should not be used to decide whether or not to accept your health care provider's advice, instructions or recommendations. Only your health care provider has the knowledge and training to provide advice that is right for you. The use of this information is governed by the [employer] End User License Agreement, available at https://www.[employer]/en/solutions/[employer]/about/cyrus.The use of [employer] content is governed by the [employer] Terms of Use. ©2021 [employer] All rights reserved.  Copyright   © 2021 UpToDate, Inc. and/or its affiliates. All rights reserved.

## 2023-10-20 NOTE — PROGRESS NOTES
Subjective     Patient ID: Ene Clemons is a 57 y.o. female.    Chief Complaint: Healthy You (Patient is here for her health you today. ), Gastroesophageal Reflux (Patient reports having acid reflux around six weeks ago that comes and goes. ), and Immunizations (Patient would like to get her influenza injection today. )    Patient is here for a [employer] healthy you. Patient needs refills. She complains of acid reflux. She takes OTC prilosec without relief.         Review of Systems   Constitutional:  Negative for fatigue and fever.   HENT:  Negative for ear pain, postnasal drip, rhinorrhea and sinus pressure/congestion.    Respiratory:  Negative for cough and shortness of breath.    Cardiovascular:  Negative for chest pain.   Gastrointestinal:  Positive for reflux. Negative for abdominal pain, diarrhea, nausea and vomiting.   Genitourinary:  Negative for dysuria.   Neurological:  Negative for headaches.       Tobacco Use: Medium Risk (10/20/2023)    Patient History     Smoking Tobacco Use: Former     Smokeless Tobacco Use: Never     Passive Exposure: Current     Review of patient's allergies indicates:   Allergen Reactions    Doxycycline     Sulfa (sulfonamide antibiotics)      Current Outpatient Medications   Medication Instructions    acyclovir (ZOVIRAX) 400 mg, Oral, 2 times daily    celecoxib (CELEBREX) 200 mg, Oral, Daily    citalopram (CELEXA) 40 mg, Oral, Daily    EUTHYROX 112 mcg, Oral, Daily    gabapentin (NEURONTIN) 300 mg, Oral, 2 times daily    HYDROcodone-acetaminophen (NORCO) 7.5-325 mg per tablet 1 tablet, Oral, 2 times daily PRN    metFORMIN (GLUCOPHAGE) 1,000 mg, Oral, 2 times daily    methocarbamoL (ROBAXIN) 500 mg, Oral, 3 times daily PRN    omeprazole (PRILOSEC) 40 mg, Oral, Daily    simvastatin (ZOCOR) 10 mg, Oral, Nightly     Medications Discontinued During This Encounter   Medication Reason    celecoxib (CELEBREX) 200 MG capsule Reorder    gabapentin (NEURONTIN) 300 MG capsule  "Reorder    citalopram (CELEXA) 40 MG tablet Reorder    EUTHYROX 112 mcg tablet Reorder    simvastatin (ZOCOR) 10 MG tablet Reorder       Past Medical History:   Diagnosis Date    Arthritis     Depression     Hyperlipidemia     Hypothyroidism     Thyroid disease      Health Maintenance Topics with due status: Not Due       Topic Last Completion Date    Colorectal Cancer Screening 11/26/2019    Diabetes Urine Screening 01/27/2023    Low Dose Statin 10/20/2023    Lipid Panel 10/20/2023    Hemoglobin A1c 10/23/2023     Immunization History   Administered Date(s) Administered    Influenza 09/26/2021    Influenza - Quadrivalent - PF *Preferred* (6 months and older) 01/27/2023, 10/20/2023    Pneumococcal Conjugate - 20 Valent 10/20/2023       Objective     Body mass index is 29.83 kg/m².  Wt Readings from Last 3 Encounters:   10/20/23 89 kg (196 lb 3.2 oz)   07/17/23 89.8 kg (198 lb)   01/27/23 89 kg (196 lb 3.2 oz)     Ht Readings from Last 3 Encounters:   10/20/23 5' 8" (1.727 m)   07/17/23 5' 8" (1.727 m)   01/27/23 5' 8" (1.727 m)     BP Readings from Last 3 Encounters:   10/20/23 138/89   07/17/23 123/83   01/27/23 128/76     Temp Readings from Last 3 Encounters:   10/20/23 98.6 °F (37 °C) (Oral)   07/17/23 98.4 °F (36.9 °C) (Oral)   01/27/23 98.4 °F (36.9 °C) (Oral)     Pulse Readings from Last 3 Encounters:   10/20/23 93   07/17/23 64   01/27/23 72     Resp Readings from Last 3 Encounters:   10/20/23 16   07/17/23 18   01/27/23 17     PF Readings from Last 3 Encounters:   No data found for PF       Physical Exam  HENT:      Head: Normocephalic and atraumatic.   Cardiovascular:      Rate and Rhythm: Normal rate and regular rhythm.   Pulmonary:      Effort: Pulmonary effort is normal.      Breath sounds: Normal breath sounds.   Neurological:      Mental Status: She is alert and oriented to person, place, and time.   Psychiatric:         Mood and Affect: Mood normal.         Behavior: Behavior normal. "         Assessment and Plan     Problem List Items Addressed This Visit          Psychiatric    Depression (Chronic)    Relevant Medications    citalopram (CELEXA) 40 MG tablet       Cardiac/Vascular    Hyperlipidemia (Chronic)    Relevant Medications    simvastatin (ZOCOR) 10 MG tablet       Endocrine    Type 2 diabetes mellitus without complication, with long-term current use of insulin (Chronic)    Acquired hypothyroidism (Chronic)    Relevant Medications    EUTHYROX 112 mcg tablet     Other Visit Diagnoses       Gastroesophageal reflux disease, unspecified whether esophagitis present    -  Primary    Relevant Medications    omeprazole (PRILOSEC) 40 MG capsule    Lumbar radiculopathy        Relevant Medications    celecoxib (CELEBREX) 200 MG capsule    gabapentin (NEURONTIN) 300 MG capsule    Other Relevant Orders    Ambulatory referral/consult to Pain Clinic    Encounter for immunization        Relevant Medications    pneumoc 20-ross conj-dip cr(PF) (PREVNAR-20 (PF)) injection Syrg 0.5 mL (Completed)    Impaired fasting glucose                Plan: Refill medications. Labs done. Educational material given.  Increase omeprazole to 40mg for uncontrolled GERD.   Patient sees Dr. Brunner for pain treatment. She requests a referral to Dr. Neff.       I have reviewed the medications, allergies, and problem list.     Goal Actions:    What type of visit is the patient here for today?: Healthy You  Does the patient consent to enroll in [facility] Healthy?: Yes  Is this a Wellness Follow Up?: No  What is your overall wellness goal? (select at least one): Lifestyle modifications, Lose weight, Understand disease process, Improve overall health  Choose 3: Biometric, Lifestyle, Nutrition, Exercise, Tobacco  Biometric Actions: Attend regularly scheduled office visits, SBP<120 and DBP<80, LDL Chol<100, Monitor and record \report B\P log, Monitor, record and report glucose levels, BMI>30 lose 1-2 lbs per week  Lifestyle Actions :  Obtain yearly mammogram, Develop good support system, Take medications as prescribed  Nurtrition Actions: Avoid adding table salt, Avoid carbonated beverages, Read nutrition labels, drink 8-10 glasses of water per day, Recommend weight loss, Eat a well-balanced diet, DASH diet, Eat heart healthy diet, Decrease intake of fast food  Exercise Actions: Recommend physical activity 30 minutes per day 3-5 times/week  Tobacco Actions: Patient will not stop using tobacco recommend reducing amt of consumption per day, Avoid all tobacco products including 2nd and 3rd hand exposure

## 2023-10-23 ENCOUNTER — PATIENT OUTREACH (OUTPATIENT)
Dept: ADMINISTRATIVE | Facility: HOSPITAL | Age: 58
End: 2023-10-23

## 2023-10-23 DIAGNOSIS — R73.01 IMPAIRED FASTING GLUCOSE: Primary | ICD-10-CM

## 2023-10-23 DIAGNOSIS — R73.9 HYPERGLYCEMIA: Primary | ICD-10-CM

## 2023-10-23 LAB
EST. AVERAGE GLUCOSE BLD GHB EST-MCNC: NORMAL MG/DL
HBA1C MFR BLD HPLC: 5.5 %

## 2023-10-23 PROCEDURE — 83036 HEMOGLOBIN A1C: ICD-10-PCS | Mod: ,,, | Performed by: CLINICAL MEDICAL LABORATORY

## 2023-10-23 PROCEDURE — 83036 HEMOGLOBIN GLYCOSYLATED A1C: CPT | Mod: ,,, | Performed by: CLINICAL MEDICAL LABORATORY

## 2023-10-23 NOTE — PROGRESS NOTES
Post visit Population Health review of encounter with date of service  10/20/23 with Richie.  Not all required HY components in encounter due to chart is opened. Needs a1c and message sent to lab  also needs  primary dx as z00.00 or z00.01 in place of Gastroesophageal reflux disease,   Followup appt for:  10/25/24 HY10/23/23 above changes made and a1c done. Lizeth

## 2023-11-06 ENCOUNTER — PATIENT OUTREACH (OUTPATIENT)
Dept: ADMINISTRATIVE | Facility: HOSPITAL | Age: 58
End: 2023-11-06

## 2023-11-06 NOTE — PROGRESS NOTES
Population Health Chart Review & Patient Outreach Details    Updates Requested / Reviewed:  [x]  Care Team Updated    Health Maintenance Topics Addressed and Outreach Outcomes / Actions Taken:          Diabetic Eye Exam [x]  Updated with February 2023 Eye Exam (Dr. Lowery). Care Team and Medical History Updated.

## 2023-12-06 ENCOUNTER — OFFICE VISIT (OUTPATIENT)
Dept: PAIN MEDICINE | Facility: CLINIC | Age: 58
End: 2023-12-06
Payer: COMMERCIAL

## 2023-12-06 ENCOUNTER — HOSPITAL ENCOUNTER (OUTPATIENT)
Dept: RADIOLOGY | Facility: HOSPITAL | Age: 58
Discharge: HOME | End: 2023-12-06
Attending: PAIN MEDICINE
Payer: COMMERCIAL

## 2023-12-06 VITALS
RESPIRATION RATE: 18 BRPM | BODY MASS INDEX: 30.31 KG/M2 | SYSTOLIC BLOOD PRESSURE: 125 MMHG | DIASTOLIC BLOOD PRESSURE: 78 MMHG | HEART RATE: 82 BPM | WEIGHT: 200 LBS | HEIGHT: 68 IN

## 2023-12-06 DIAGNOSIS — M46.1 BILATERAL SACROILIITIS: ICD-10-CM

## 2023-12-06 DIAGNOSIS — M54.16 LUMBAR RADICULOPATHY: ICD-10-CM

## 2023-12-06 DIAGNOSIS — Z79.899 ENCOUNTER FOR LONG-TERM (CURRENT) USE OF OTHER MEDICATIONS: Primary | ICD-10-CM

## 2023-12-06 LAB
CTP QC/QA: YES
POC (AMP) AMPHETAMINE: NEGATIVE
POC (BAR) BARBITURATES: NEGATIVE
POC (BUP) BUPRENORPHINE: NEGATIVE
POC (BZO) BENZODIAZEPINES: ABNORMAL
POC (COC) COCAINE: NEGATIVE
POC (MDMA) METHYLENEDIOXYMETHAMPHETAMINE 3,4: NEGATIVE
POC (MET) METHAMPHETAMINE: NEGATIVE
POC (MOP) OPIATES: NEGATIVE
POC (MTD) METHADONE: NEGATIVE
POC (OXY) OXYCODONE: NEGATIVE
POC (PCP) PHENCYCLIDINE: NEGATIVE
POC (TCA) TRICYCLIC ANTIDEPRESSANTS: NEGATIVE
POC TEMPERATURE (URINE): 94
POC THC: NEGATIVE

## 2023-12-06 PROCEDURE — 72114 XR LUMBAR SPINE 5 VIEW WITH FLEX AND EXT: ICD-10-PCS | Mod: 26,,, | Performed by: RADIOLOGY

## 2023-12-06 PROCEDURE — 99215 OFFICE O/P EST HI 40 MIN: CPT | Mod: PBBFAC | Performed by: PAIN MEDICINE

## 2023-12-06 PROCEDURE — 99999PBSHW POCT URINE DRUG SCREEN PRESUMP: Mod: PBBFAC,,,

## 2023-12-06 PROCEDURE — G0480 PR DRUG TEST DEF 1-7 CLASSES: ICD-10-PCS | Mod: 90,59,, | Performed by: CLINICAL MEDICAL LABORATORY

## 2023-12-06 PROCEDURE — 72114 X-RAY EXAM L-S SPINE BENDING: CPT | Mod: TC

## 2023-12-06 PROCEDURE — 72202 X-RAY EXAM SI JOINTS 3/> VWS: CPT | Mod: 26,,, | Performed by: RADIOLOGY

## 2023-12-06 PROCEDURE — G0480 DRUG TEST DEF 1-7 CLASSES: HCPCS | Mod: 90,59,, | Performed by: CLINICAL MEDICAL LABORATORY

## 2023-12-06 PROCEDURE — 80307 DRUG TEST PRSMV CHEM ANLYZR: CPT | Mod: 90,,, | Performed by: CLINICAL MEDICAL LABORATORY

## 2023-12-06 PROCEDURE — 80307 MAYO GENERIC ORDERABLE: ICD-10-PCS | Mod: 90,,, | Performed by: CLINICAL MEDICAL LABORATORY

## 2023-12-06 PROCEDURE — 72202 XR SACROILIAC JOINTS COMPLETE: ICD-10-PCS | Mod: 26,,, | Performed by: RADIOLOGY

## 2023-12-06 PROCEDURE — 73522 XR HIP 3 OR 4 VIEWS BILATERAL: ICD-10-PCS | Mod: 26,,, | Performed by: RADIOLOGY

## 2023-12-06 PROCEDURE — 99204 OFFICE O/P NEW MOD 45 MIN: CPT | Mod: S$PBB,,, | Performed by: PAIN MEDICINE

## 2023-12-06 PROCEDURE — 80305 DRUG TEST PRSMV DIR OPT OBS: CPT | Mod: PBBFAC | Performed by: PAIN MEDICINE

## 2023-12-06 PROCEDURE — 72114 X-RAY EXAM L-S SPINE BENDING: CPT | Mod: 26,,, | Performed by: RADIOLOGY

## 2023-12-06 PROCEDURE — 99999PBSHW POCT URINE DRUG SCREEN PRESUMP: ICD-10-PCS | Mod: PBBFAC,,,

## 2023-12-06 PROCEDURE — 73522 X-RAY EXAM HIPS BI 3-4 VIEWS: CPT | Mod: 26,,, | Performed by: RADIOLOGY

## 2023-12-06 PROCEDURE — 72202 X-RAY EXAM SI JOINTS 3/> VWS: CPT | Mod: TC

## 2023-12-06 PROCEDURE — 73522 X-RAY EXAM HIPS BI 3-4 VIEWS: CPT | Mod: TC

## 2023-12-06 PROCEDURE — 99204 PR OFFICE/OUTPT VISIT, NEW, LEVL IV, 45-59 MIN: ICD-10-PCS | Mod: S$PBB,,, | Performed by: PAIN MEDICINE

## 2023-12-06 RX ORDER — NAPROXEN SODIUM 550 MG/1
550 TABLET ORAL 2 TIMES DAILY WITH MEALS
Qty: 60 TABLET | Refills: 1 | Status: SHIPPED | OUTPATIENT
Start: 2023-12-06 | End: 2024-01-07

## 2023-12-06 NOTE — PROGRESS NOTES
Chronic Pain - New Consult    Referring Physician: Lawanda Hargrove MD       SUBJECTIVE: Disclaimer: This note has been generated using voice-recognition software. There may be typographical errors that have been missed during proof-reading      Initial encounter:    Ene Clemons presents for evaluation of low back pain.  She was treated by Dr. Brunner from 2017 until 2 months ago with SI joint injections X 2 with 100% pain relief for the duration of the local anesthetics.  A SI radiofrequency procedure was denied per patient's insurance September 23, 2023 and unfortunately she failed to receive a response from his office.  A previous sacroiliac radiofrequency procedure greater than 1 year ago provided 80% pain relief for close to a year. Symptoms have been present for 6 years and include radicular symptoms to the hips, groin, bilateral lower extremities, feet and SI joints.  She notes weakness of the lower extremities but  denies falling, bowel or bladder involvement.. Initial inciting event: none. Symptoms are worst: At no particnoneular time. Alleviating factors identifiable by patient are  medication and heat . Exacerbating factors identifiable by patient are bending backwards, bending forwards, bending sideways, standing, and walking. Previous lower back problems:  None prior to initial onset in 2017 . Previous workup:  She has not received an MRI of the lumbar spine but x-rays of the lumbar spine revealed degenerative changes from 2021 . Previous treatments:  SI joint injection and radiofrequency of the SI joints.         Pain Assessment  Pain Assessment: 0-10  Pain Score:   7  Pain Location: Back  Pain Descriptors: Sharp, Aching  Pain Frequency: Constant/continuous  Pain Onset: Gradual  Aggravating Factors: Bending, Standing, Walking  Pain Intervention(s): Medication (See eMAR), Home medication, Heat applied      Physical Therapy/Home Exercise: yes, greater than 5 years ago in [facility]  "      Pain Medications:  has a current medication list which includes the following prescription(s): acyclovir, celecoxib, citalopram, euthyrox, gabapentin, metformin, methocarbamol, omeprazole, simvastatin, hydrocodone-acetaminophen, and naproxen sodium.      Tried in Past:  NSAIDS-yes  SNRI-no  Anti-convulsants-yes  Muscle Relaxants-yes  Opioids-yes  Benzodiazepines-no     4A"s of Opioid Risk Assessment  Activity: Patient can perform  ADL  Analgesia:  Patient's pain is partially controlled by current medication.   Aberrant Behavior:  reviewed with no aberrant drug seeking/taking behavior     report:  Reviewed and consistent with medication use as prescribed.    Patient denies suicidal or homicidal ideations    Pain interventional therapy-yes, Dr. Brunner    Chiropractor -yes, 3-4 years ago  Acupuncture - no  TENS unit -no  Spinal decompression -no  Joint replacement -no     Review of Systems   Constitutional: Negative.    HENT: Negative.     Eyes: Negative.    Respiratory: Negative.     Cardiovascular: Negative.    Gastrointestinal: Negative.    Endocrine: Negative.    Genitourinary: Negative.    Musculoskeletal:  Positive for arthralgias (Bilateral hips) and back pain.   Integumentary:  Negative.   Neurological: Negative.    Hematological: Negative.    Psychiatric/Behavioral: Negative.               X-Ray Lumbar Complete Including Flex And Ext  Narrative: EXAMINATION:  XR LUMBAR SPINE AP and lateral VIEW WITH FLEX AND EXT    CLINICAL HISTORY:  Radiculopathy, lumbar region    TECHNIQUE:  AP and lateral views of the lumbar spine plus flexion extension views were performed.    COMPARISON:  05/12/2021    FINDINGS:  The vertebral body heights are maintained.  There is mild degree of anterolisthesis a 3-4 mm of L4 on L5.  Remainder of the sagittal alignment is maintained.  No significant change in alignment on dynamic imaging.  Mild to moderate degenerative endplate changes are seen most notably at L3-4 where " there is endplate sclerosis, disc height loss, and osteophyte formation.  Moderate facet degeneration L3-4 through L5-S1 also with sclerosis and osteophyte formation.  Impression: Multilevel degenerative changes.    Electronically signed by: Jovanny Ariza  Date:    12/06/2023  Time:    13:05  X-Ray Hip 3 or 4 views Bilateral  Narrative: EXAMINATION:  XR SACROILIAC JOINTS COMPLETE; XR HIP 3 OR 4 VIEWS BILATERAL    CLINICAL HISTORY:  Sacroiliitis, not elsewhere classified    TECHNIQUE:  AP pelvis, AP and frogleg lateral of the right and left hip, AP and oblique views of the right and left sacroiliac joint.    COMPARISON:  None    FINDINGS:  No fracture.  There are mild-to-moderate degenerative changes seen of both sacroiliac joints and of both hips.  No dislocation.  Soft tissues unremarkable.  Impression: Mild-to-moderate degenerative changes of the sacroiliac joints and of both hips.    Electronically signed by: Jovanny Ariza  Date:    12/06/2023  Time:    13:04  X-Ray Sacroiliac Joints Complete  Narrative: EXAMINATION:  XR SACROILIAC JOINTS COMPLETE; XR HIP 3 OR 4 VIEWS BILATERAL    CLINICAL HISTORY:  Sacroiliitis, not elsewhere classified    TECHNIQUE:  AP pelvis, AP and frogleg lateral of the right and left hip, AP and oblique views of the right and left sacroiliac joint.    COMPARISON:  None    FINDINGS:  No fracture.  There are mild-to-moderate degenerative changes seen of both sacroiliac joints and of both hips.  No dislocation.  Soft tissues unremarkable.  Impression: Mild-to-moderate degenerative changes of the sacroiliac joints and of both hips.    Electronically signed by: Jovanny Ariza  Date:    12/06/2023  Time:    13:04         Past Medical History:   Diagnosis Date    Arthritis     Depression     Diabetic eye exam 02/27/2023    Dr. Dueñas [facility] [place]    Hyperlipidemia     Hypothyroidism     Thyroid disease      Past Surgical History:   Procedure Laterality Date    ADENOIDECTOMY      CHOLECYSTECTOMY       HYSTERECTOMY      TONSILLECTOMY      TOTAL THYROIDECTOMY       Social History     Socioeconomic History    Marital status:      Spouse name: Hector    Number of children: 0   Occupational History    Occupation: retired   Tobacco Use    Smoking status: Former     Current packs/day: 0.00     Average packs/day: 0.5 packs/day for 39.0 years (19.5 ttl pk-yrs)     Types: Cigarettes     Start date:      Quit date:      Years since quittin.9     Passive exposure: Current    Smokeless tobacco: Never   Substance and Sexual Activity    Alcohol use: Not Currently    Drug use: Never    Sexual activity: Yes     Partners: Male     Social Determinants of Health     Financial Resource Strain: Low Risk  (2022)    Overall Financial Resource Strain (CARDIA)     Difficulty of Paying Living Expenses: Not hard at all   Food Insecurity: No Food Insecurity (2022)    Hunger Vital Sign     Worried About Running Out of Food in the Last Year: Never true     Ran Out of Food in the Last Year: Never true   Transportation Needs: No Transportation Needs (2022)    PRAPARE - Transportation     Lack of Transportation (Medical): No     Lack of Transportation (Non-Medical): No   Physical Activity: Inactive (2022)    Exercise Vital Sign     Days of Exercise per Week: 0 days     Minutes of Exercise per Session: 0 min   Stress: No Stress Concern Present (2022)    [place] [employer] - Occupational Stress Questionnaire     Feeling of Stress : Not at all   Social Connections: Moderately Isolated (2022)    Social Connection and Isolation Panel [NHANES]     Frequency of Communication with Friends and Family: More than three times a week     Frequency of Social Gatherings with Friends and Family: More than three times a week     Attends Sabianist Services: Never     Active Member of Clubs or Organizations: No     Attends Club or Organization Meetings: Never     Marital Status:   "  Housing Stability: Low Risk  (1/28/2022)    Housing Stability Vital Sign     Unable to Pay for Housing in the Last Year: No     Number of Places Lived in the Last Year: 0     Unstable Housing in the Last Year: No     Family History   Problem Relation Age of Onset    No Known Problems Mother     Arthritis Father     Heart disease Father     Cancer Father     No Known Problems Sister      Review of patient's allergies indicates:   Allergen Reactions    Doxycycline     Sulfa (sulfonamide antibiotics)          OBJECTIVE:  Vitals:    12/06/23 1029   BP: 125/78   Pulse: 82   Resp: 18     /78   Pulse 82   Resp 18   Ht 5' 8" (1.727 m)   Wt 90.7 kg (200 lb)   BMI 30.41 kg/m²   Physical Exam  Vitals and nursing note reviewed.   Constitutional:       General: She is not in acute distress.     Appearance: Normal appearance. She is not ill-appearing, toxic-appearing or diaphoretic.   HENT:      Head: Normocephalic and atraumatic.      Nose: Nose normal.      Mouth/Throat:      Mouth: Mucous membranes are moist.   Eyes:      Extraocular Movements: Extraocular movements intact.      Pupils: Pupils are equal, round, and reactive to light.   Cardiovascular:      Rate and Rhythm: Normal rate and regular rhythm.      Heart sounds: Normal heart sounds.   Pulmonary:      Effort: Pulmonary effort is normal. No respiratory distress.      Breath sounds: Normal breath sounds. No stridor. No wheezing or rhonchi.   Abdominal:      General: Bowel sounds are normal.      Palpations: Abdomen is soft.   Musculoskeletal:         General: No swelling or deformity.      Cervical back: Normal and normal range of motion. No spasms or tenderness. No pain with movement. Normal range of motion.      Thoracic back: Normal.      Lumbar back: Tenderness and bony tenderness present. No spasms. Decreased range of motion. Negative right straight leg raise test and negative left straight leg raise test. No scoliosis.      Right lower leg: No edema. "      Left lower leg: No edema.      Comments: Lumbar pain with flexion, extension lateral rotation.  SI Joint Exam:     Palpation of the bilateral SI joint is painful.  ALECIA test is positive bilaterally  Gaenslen test is positive bilaterally  Thigh thrust test is positive Bilaterally      Skin:     General: Skin is warm.   Neurological:      General: No focal deficit present.      Mental Status: She is alert and oriented to person, place, and time. Mental status is at baseline.      Cranial Nerves: No cranial nerve deficit.      Sensory: Sensation is intact. No sensory deficit.      Motor: No weakness.      Coordination: Coordination normal.      Gait: Gait normal.      Deep Tendon Reflexes: Reflexes are normal and symmetric.   Psychiatric:         Mood and Affect: Mood normal.         Behavior: Behavior normal.            ASSESSMENT: 58 y.o. year old female with pain, consistent with     Encounter Diagnoses   Name Primary?    Lumbar radiculopathy     Encounter for long-term (current) use of other medications Yes    Bilateral sacroiliitis         PLAN:   1. reviewed  2..Addiction, Dependency, Tolerance, Opioid abuse-misuse, Death, Diversion Discussed. Overdose reversal drug Naloxone discussed  2.UDS point of care obtained for new patient evaluation and consultation. We will obtain a definitve UDS for confirmation.  3. Opioid contract signed today  4.Refill/ Continue medications for pain control and function       Requested Prescriptions     Signed Prescriptions Disp Refills    naproxen sodium (ANAPROX) 550 MG tablet 60 tablet 1     Sig: Take 1 tablet (550 mg total) by mouth 2 (two) times daily with meals.     5. Obtain x-ray of the SI joint, both hips and lumbar spine  6. Start physical therapy 2 to 3 times week x6 weeks for lower back pain and sacroiliitis  7.Urine drug screen and confirmation testing was ordered as documented on the requisition form in order to monitor for compliance with prescribed opiates  and to ensure that there was no misuse/abuse of other non-prescribed opiates or illicit drugs.  I will determine if the patient is suitable for continuation of opioid therapy after obtaining  the definitive urine drug screen for confirmation.    Orders Placed This Encounter   Procedures    X-Ray Sacroiliac Joints Complete     Standing Status:   Future     Number of Occurrences:   1     Standing Expiration Date:   12/6/2024     Order Specific Question:   May the Radiologist modify the order per protocol to meet the clinical needs of the patient?     Answer:   Yes     Order Specific Question:   Release to patient     Answer:   Immediate    X-Ray Hip 3 or 4 views Bilateral     Standing Status:   Future     Number of Occurrences:   1     Standing Expiration Date:   12/6/2024     Order Specific Question:   May the Radiologist modify the order per protocol to meet the clinical needs of the patient?     Answer:   Yes     Order Specific Question:   Release to patient     Answer:   Immediate    X-Ray Lumbar Complete Including Flex And Ext     Standing Status:   Future     Number of Occurrences:   1     Standing Expiration Date:   12/6/2024     Order Specific Question:   May the Radiologist modify the order per protocol to meet the clinical needs of the patient?     Answer:   Yes    Drug Screen Definitive 14, Urine     Standing Status:   Future     Number of Occurrences:   1     Standing Expiration Date:   2/3/2025     Order Specific Question:   Specimen Source     Answer:   Urine    [place] GENERIC ORDERABLE CSMPU - Overview: Controlled Substance Monitoring Panel, Random, Urine     Order Specific Question:   Miguel A Parsons Key and Test Code     Answer:   CSMPU - Overview: Controlled Substance Monitoring Panel, Random, Urine    Ambulatory referral/consult to Physical/Occupational Therapy     Standing Status:   Future     Standing Expiration Date:   1/6/2025     Referral Priority:   Routine     Referral Type:   Physical Medicine      Referral Reason:   Specialty Services Required     Requested Specialty:   Physical Therapy     Number of Visits Requested:   1    POCT Urine Drug Screen ([facility])     Interpretive Information:     Negative:  No drug detected at the cut off level.   Positive:  This result represents presumptive positive for the   tested drug, other substances may yield a positive response other   than the analyte of interest. This result should be utilized for   diagnostic purpose only. Confirmation testing will be performed upon physician request only.         8. Consider MRI of the lumbar spine after completion of physical therapy if indicated  9. Consider repeating SI joint injection #3 after completing physical therapy for bilateral sacroiliitis  10. Return in 6 weeks for re-evaluation and treatment options    The total time spent for evaluation and management on 12/07/2023 including reviewing separately obtained history, performing a medically appropriate exam and evaluation, documenting clinical information in the health record, independently interpreting results and communicating them to the patient/family/caregiver, and ordering medications/tests/procedures was between 15-29 minutes.    The above plan and management options were discussed at length with patient. Patient is in agreement with the above and verbalized understanding. It will be communicated with the referring physician via electronic record, fax, or mail.    Radhika Neff  12/07/2023

## 2023-12-11 LAB — MAYO GENERIC ORDERABLE RESULT: NORMAL

## 2023-12-14 ENCOUNTER — CLINICAL SUPPORT (OUTPATIENT)
Dept: REHABILITATION | Facility: HOSPITAL | Age: 58
End: 2023-12-14
Payer: COMMERCIAL

## 2023-12-14 DIAGNOSIS — M46.1 BILATERAL SACROILIITIS: ICD-10-CM

## 2023-12-14 DIAGNOSIS — M54.16 LUMBAR RADICULOPATHY: ICD-10-CM

## 2023-12-14 PROCEDURE — 97162 PT EVAL MOD COMPLEX 30 MIN: CPT

## 2023-12-14 NOTE — PLAN OF CARE
[facility]   Physical Therapy Initial Evaluation      Name: Ene Clemons  Clinic Number: [id number]    Therapy Diagnosis:   Encounter Diagnoses   Name Primary?    Lumbar radiculopathy     Bilateral sacroiliitis         Physician: Radhika Neff MD    Physician Orders: PT Eval and Treat   Medical Diagnosis from Referral:   M54.16 (ICD-10-CM) - Lumbar radiculopathy   M46.1 (ICD-10-CM) - Bilateral sacroiliitis     Evaluation Date: 12/14/2023  Plan of Care Expiration: 1/25/2024  Progress Note Due: 1/12/2024  Date of Surgery: N/A  Visit # / Visits authorized: 1/ 12   FOTO: 43/ 100    Precautions: Standard     Time In: 12:30 PM  Time Out: 1:00 PM  Total Billable Time: 30 minutes    Subjective     Date of onset: N/a    History of current condition - Ene reports: pain started in lower back. She has had injections and a nerve burn with the last injection being in September but she states she only had about 3 weeks of relief after that. She states the pain has now migrated. She states the pain is worst in her hip and notes that she has radicular pain that extends along the sides of her thighs and down the front of her lower leg. Sitting slouched relieves pain. Walking, standing for over 5 minutes, and performing household chore which require her to bend over seem to increase her pain.    Falls: None    Imaging:     Prior Therapy: Yes more than 5 years ago  Social History:  lives with spouse  Occupation: Retired  Prior Level of Function: Independent  Current Level of Function:     Pain:  Current 7/10, worst 9/10, best 5/10   Location: bilateral hips and lower back  right side more pronounced  Description: Aching  Aggravating Factors: Standing, Bending, and Walking  Easing Factors: pain medication    Patients goals: Pain relief     Medical History:   Past Medical History:   Diagnosis Date    Arthritis     Depression     Diabetic eye exam 02/27/2023    Dr. Spenser [facility] [place]     Hyperlipidemia     Hypothyroidism     Thyroid disease        Surgical History:   Ene Clemons  has a past surgical history that includes Adenoidectomy; Cholecystectomy; Hysterectomy; Tonsillectomy; and Total thyroidectomy.    Medications:   Ene has a current medication list which includes the following prescription(s): acyclovir, celecoxib, citalopram, euthyrox, gabapentin, hydrocodone-acetaminophen, metformin, methocarbamol, naproxen sodium, omeprazole, and simvastatin.    Allergies:   Review of patient's allergies indicates:   Allergen Reactions    Doxycycline     Sulfa (sulfonamide antibiotics)         Objective      Right moves short to long with longsitting  Posture:  Standing lordosis: Decreased  Sitting lordosis: Decreased  Iliac crest height:  equal  PSIS height:  equal  Pelvic rotation/torsion: No  Scoliosis: No  Lateral shift: No  Comments:    Forward bend: WFL  Back bend: WFL  Lateral trunk lean: right 32 left 32  Trunk rotation: right 55 left 55    MANUAL MUSCLE TEST  Right left   Hip flexion MMT number: 4/5 MMT strength: 4/5   Hip abduction MMT strength: 4/5 MMT strength: 4/5   Knee extension MMT strength: 4/5 MMT strength: 4/5   Knee flexion  MMT strength: 4+/5 MMT strength: 4+/5   Ankle dorsiflexion MMT strength: 5/5 MMT strength: 5/5   Ankle plantar flexion  MMT strength: 5/5 MMT strength: 5/5   Extensor hallucis longus MMT strength: 5/5 MMT strength: 5/5     ROM/flexibility right left   Hip flexion (120) 115 115   Internal rotation (45) 20 20   External rotation (45) 35 35   Hamstring 90/90 (-10) 0 0   Rectus femoris (120) 110 110   Other     Other       Special test Right  Left    SLR test < 60 degrees Negative Negative   SLR test > 60 degrees Positive Positive   Sitting slump test Negative Negative   Piriformis test Positive Positive   ALECIA test Positive Positive   SI forward bend Negative Negative   SI distraction Negative Negative   SI compression Negative Negative     Spinal  mobility:  Segment:     Other test/information: Repeated Extension increased pain, LLD: Right moves short to long with longsitting muscle activation corrects      Patient Education and Home Exercises     Education provided:   - on correct performance of therapeutic interventions and evaluation findings    Written Home Exercises Provided:  on next treatment visit .    Assessment     Ene is a 58 y.o. female referred to outpatient Physical Therapy with a medical diagnosis of lumbar radiculopathy. Patient presents with lumbar pain, limited pain free range of motion, mild piriformis syndrome.    Patient prognosis is Excellent.   Patient will benefit from skilled outpatient Physical Therapy to address the deficits stated above and in the chart below, provide patient /family education, and to maximize patientt's level of independence.     Plan of care discussed with patient: Yes  Patient's spiritual, cultural and educational needs considered and patient is agreeable to the plan of care and goals as stated below:     Anticipated Barriers for therapy: none    Goals:  Short Term Goals: 4 weeks   1. Patient will be Independent with Home Exercise Program   2. Patient will demonstrate with improved Posture and Body Mechanics  3. Patient will increase Lumbosacral Range of Motion by 25% pain free  4. Patient will increase Lower Extremity and Core Strength to grossly 4+/5  5. Patient will decrease complaints of pain with activity to Less than or Equal to 5/10     Long Term Goals: 6 weeks   1. Patient will tolerate 30 minutes of activity with complaints of Pain at Less Than or Equal to 4/10    Plan     Plan of care Certification: 12/14/2023 to 1/25/2024 .    Outpatient Physical Therapy 2 times weekly for 6 weeks to include the following interventions: Electrical Stimulation IFC, Manual Therapy, Moist Heat/ Ice, Neuromuscular Re-ed, Patient Education, and Therapeutic Exercise.     Demario Figueroa PT        Physician's Signature:  _________________________________________ Date: ________________

## 2023-12-22 NOTE — PROGRESS NOTES
[facility]   Physical Therapy Treatment Note      Name: Ene Clemons  Clinic Number: [id number]    Therapy Diagnosis: No diagnosis found.  Physician: Radhika Neff MD    Visit Date: 12/26/2023    Physician Orders: PT Eval and Treat   Medical Diagnosis from Referral:   M54.16 (ICD-10-CM) - Lumbar radiculopathy   M46.1 (ICD-10-CM) - Bilateral sacroiliitis      Evaluation Date: 12/14/2023  Plan of Care Expiration: 1/25/2024  Progress Note Due: 1/12/2024  Plan of care Certification: 12/14/2023 to 1/25/2024           .  Date of Surgery: N/A  Visit # / Visits authorized: 2/ 12   FOTO: 43/ 100     Precautions: Standard      Time In: 1220 PM  Time Out: 1310    PM  Total Billable Time:   50   minutes    PTA Visit #: 1/5       Subjective     Patient reports: increased bilateral pain in hips  She was compliant with home exercise program.      Pain: 7/10 (hip pain today)  Location: bilateral back/hip    Objective      Objective Measures updated at progress report unless specified.     Treatment     Ene received the treatments listed below:      therapeutic exercises to develop strength, endurance, ROM, and flexibility for  50   minutes including:  Sci Fit Stepper x 5 min  Bridging 1x15 with 3 sec hold  Pelvic tilts 1x15 with 3 sec holds  Lumbar rotation with swiss ball 1x10 with 3 sec holds   SKTC 5 x 10 sec each   Sciatic nerve glide (not today)  Hip ADD with ball 2x10 with 3 sec holds  Hip ABD with TB 2x10 with TB  Clams 1x10   Piriformis stretch 3x10 sec each  HS stretch 3x15 sec each  Seated flexion stretch (not this visit)  Seated ball roll outs 5 x 5 sec holds  Prayer stretch 5x5 sec holds    HEP demonstration and education x 5 min    (Not this visit)  manual therapy techniques were applied to the bilateral lumbar paraspinals  for  minutes, including:        (Not this visit)  supervised modalities after being cleared for contradictions: IFC Electrical Stimulation:  Ene received IFC  Electrical Stimulation for pain control applied to the     . Pt received stimulation at 100 % scan at a frequency of       for       minutes. Ene tolderated treatment well without any adverse effects.      hot pack for     minutes to .    cold pack for       minutes to .    Patient Education and Home Exercises       Education provided: yes    Written Home Exercises Provided: yes. Exercises were reviewed and Ene was able to demonstrate them prior to the end of the session.  Ene demonstrated good  understanding of the education provided. See Electronic Medical Record under Patient Instructions for exercises provided during therapy sessions    Assessment     Pt tolerated Tx well today to promote increased lumbar ROM and strengthening.  PTA provided skilled cues for correct posture and positioning.  PTA provided HEP education and demonstration with pt verbalizing understanding.     Ene Is progressing well towards her goals.   Patient prognosis is Excellent.     Patient will continue to benefit from skilled outpatient physical therapy to address the deficits listed in the problem list box on initial evaluation, provide pt/family education and to maximize pt's level of independence in the home and community environment.     Patient's spiritual, cultural and educational needs considered and pt agreeable to plan of care and goals.     Anticipated barriers to physical therapy: none    Goals:   Goals:  Short Term Goals: 4 weeks   1. Patient will be Independent with Home Exercise Program   2. Patient will demonstrate with improved Posture and Body Mechanics  3. Patient will increase Lumbosacral Range of Motion by 25% pain free  4. Patient will increase Lower Extremity and Core Strength to grossly 4+/5  5. Patient will decrease complaints of pain with activity to Less than or Equal to 5/10      Long Term Goals: 6 weeks   1. Patient will tolerate 30 minutes of activity with complaints of Pain at Less Than or Equal to  4/10      Plan     Pt will continue with POC.    Karime Corbin, PTA

## 2023-12-26 ENCOUNTER — CLINICAL SUPPORT (OUTPATIENT)
Dept: REHABILITATION | Facility: HOSPITAL | Age: 58
End: 2023-12-26
Payer: COMMERCIAL

## 2023-12-26 DIAGNOSIS — M54.16 LUMBAR RADICULOPATHY: Primary | ICD-10-CM

## 2023-12-26 PROCEDURE — 97110 THERAPEUTIC EXERCISES: CPT | Mod: CQ

## 2023-12-29 ENCOUNTER — CLINICAL SUPPORT (OUTPATIENT)
Dept: REHABILITATION | Facility: HOSPITAL | Age: 58
End: 2023-12-29
Payer: COMMERCIAL

## 2023-12-29 DIAGNOSIS — M54.16 LUMBAR RADICULOPATHY: Primary | ICD-10-CM

## 2023-12-29 PROCEDURE — 97110 THERAPEUTIC EXERCISES: CPT

## 2023-12-29 NOTE — PROGRESS NOTES
[facility]   Physical Therapy Treatment Note      Name: Ene Clemons  Clinic Number: [id number]    Therapy Diagnosis:   Encounter Diagnosis   Name Primary?    Lumbar radiculopathy Yes     Physician: Radhika Neff MD    Visit Date: 12/29/2023    Physician Orders: PT Eval and Treat   Medical Diagnosis from Referral:   M54.16 (ICD-10-CM) - Lumbar radiculopathy   M46.1 (ICD-10-CM) - Bilateral sacroiliitis      Evaluation Date: 12/14/2023  Plan of Care Expiration: 1/25/2024  Progress Note Due: 1/12/2024  Plan of care Certification: 12/14/2023 to 1/25/2024           .  Date of Surgery: N/A  Visit # / Visits authorized: 3/ 12   FOTO: 43/ 100     Precautions: Standard      Time In: 1:15 PM  Time Out: 2:00    PM  Total Billable Time:   45   minutes    PTA Visit #: 0/5       Subjective     Patient reports: mild pain in center of lumbar spine  She was compliant with home exercise program.      Pain: 5/10 (hip pain today)  Location: bilateral back/hip    Objective      Objective Measures updated at progress report unless specified.     Treatment     Ene received the treatments listed below:      therapeutic exercises to develop strength, endurance, ROM, and flexibility for  50   minutes including:  Sci Fit Stepper x 5 min  Bird Dogs x30   Open Books x10 BL   Dead Bugs x10 BL (increased pain in groin)  Bridging w/ piriformis stretch x15 BL  Pelvic tilts 1x15 with 3 sec holds  Lumbar rotation with swiss ball 1x10 with 3 sec holds   Swiss Ball Rolls (supine) (flexion) x20  Sciatic nerve glide (not today)  Hip ADD with ball 2x10 with 3 sec holds  Hip ABD with TB 2x10 with TB  [place] Stretch 4x20 secs    HEP demonstration and education x 5 min    (Not this visit)  manual therapy techniques were applied to the bilateral lumbar paraspinals  for  minutes, including:        (Not this visit)  supervised modalities after being cleared for contradictions: IFC Electrical Stimulation:  Ene received IFC  Electrical Stimulation for pain control applied to the     . Pt received stimulation at 100 % scan at a frequency of       for       minutes. Ene tolderated treatment well without any adverse effects.      hot pack for     minutes to .    cold pack for       minutes to .    Patient Education and Home Exercises       Education provided: yes    Written Home Exercises Provided: yes. Exercises were reviewed and Ene was able to demonstrate them prior to the end of the session.  Ene demonstrated good  understanding of the education provided. See Electronic Medical Record under Patient Instructions for exercises provided during therapy sessions    Assessment     Pt tolerated Tx well today to promote increased lumbar ROM and strengthening.      Ene Is progressing well towards her goals.   Patient prognosis is Excellent.     Patient will continue to benefit from skilled outpatient physical therapy to address the deficits listed in the problem list box on initial evaluation, provide pt/family education and to maximize pt's level of independence in the home and community environment.     Patient's spiritual, cultural and educational needs considered and pt agreeable to plan of care and goals.     Anticipated barriers to physical therapy: none    Goals:   Goals:  Short Term Goals: 4 weeks   1. Patient will be Independent with Home Exercise Program   2. Patient will demonstrate with improved Posture and Body Mechanics  3. Patient will increase Lumbosacral Range of Motion by 25% pain free  4. Patient will increase Lower Extremity and Core Strength to grossly 4+/5  5. Patient will decrease complaints of pain with activity to Less than or Equal to 5/10      Long Term Goals: 6 weeks   1. Patient will tolerate 30 minutes of activity with complaints of Pain at Less Than or Equal to 4/10      Plan     Pt will continue with POC.    Demario Figueroa, PT

## 2024-01-02 ENCOUNTER — CLINICAL SUPPORT (OUTPATIENT)
Dept: REHABILITATION | Facility: HOSPITAL | Age: 59
End: 2024-01-02
Payer: COMMERCIAL

## 2024-01-02 DIAGNOSIS — M54.16 LUMBAR RADICULOPATHY: Primary | ICD-10-CM

## 2024-01-02 PROCEDURE — 97110 THERAPEUTIC EXERCISES: CPT | Mod: CQ

## 2024-01-02 NOTE — PROGRESS NOTES
[facility]   Physical Therapy Treatment Note      Name: Ene Clemons  Clinic Number: [id number]    Therapy Diagnosis:   Encounter Diagnosis   Name Primary?    Lumbar radiculopathy Yes     Physician: Radhika Neff MD    Visit Date: 1/2/2024    Physician Orders: PT Eval and Treat   Medical Diagnosis from Referral:   M54.16 (ICD-10-CM) - Lumbar radiculopathy   M46.1 (ICD-10-CM) - Bilateral sacroiliitis      Evaluation Date: 12/14/2023  Plan of Care Expiration: 1/25/2024  Progress Note Due: 1/12/2024  Plan of care Certification: 12/14/2023 to 1/25/2024           .  Date of Surgery: N/A  Visit # / Visits authorized: 4/ 12   FOTO: 43/ 100     Precautions: Standard      Time In: 1230 PM  Time Out: 1310   PM  Total Billable Time:  40  minutes    PTA Visit #: 1/5       Subjective     Patient reports: mild pain in center of lumbar spine  She was compliant with home exercise program.      Pain: 7/10 R hip and lower lumbar   Location: bilateral back/hip    Objective      Objective Measures updated at progress report unless specified.     Treatment     Ene received the treatments listed below:      therapeutic exercises to develop strength, endurance, ROM, and flexibility for  40   minutes including:  Sci Fit Stepper x 5 min  Bird Dogs x30   Cat/cow 1x10   Open Books x10 BL   Dead Bugs x10 BL (increased pain in groin)  Bridging w/ piriformis stretch x15 BL  Pelvic tilts 1x15 with 3 sec holds  Lumbar rotation with swiss ball 1x15 with 3 sec holds   Swiss Ball Rolls (supine) (flexion) x20  Sciatic nerve glide (not today)  Hip ADD with ball 2x10 with 3 sec holds  Hip ABD with TB 2x10 with TB  Milan Stretch 4x20 secs  Seated ball roll outs 5 x10  Seated piriformis stretch/hip flexor stretch 5x10       (Not this visit)  manual therapy techniques were applied to the bilateral lumbar paraspinals  for  minutes, including:        (Not this visit)  supervised modalities after being cleared for  contradictions: IFC Electrical Stimulation:  Ene received IFC Electrical Stimulation for pain control applied to the     . Pt received stimulation at 100 % scan at a frequency of       for       minutes. Ene tolderated treatment well without any adverse effects.      hot pack for     minutes to .    cold pack for       minutes to .    Patient Education and Home Exercises       Education provided: yes    Written Home Exercises Provided: yes. Exercises were reviewed and Ene was able to demonstrate them prior to the end of the session.  Ene demonstrated good  understanding of the education provided. See Electronic Medical Record under Patient Instructions for exercises provided during therapy sessions    Assessment     Pt tolerated Tx well today to promote increased lumbar ROM and strengthening.      Ene Is progressing well towards her goals.   Patient prognosis is Excellent.     Patient will continue to benefit from skilled outpatient physical therapy to address the deficits listed in the problem list box on initial evaluation, provide pt/family education and to maximize pt's level of independence in the home and community environment.     Patient's spiritual, cultural and educational needs considered and pt agreeable to plan of care and goals.     Anticipated barriers to physical therapy: none    Goals:   Goals:  Short Term Goals: 4 weeks   1. Patient will be Independent with Home Exercise Program   2. Patient will demonstrate with improved Posture and Body Mechanics  3. Patient will increase Lumbosacral Range of Motion by 25% pain free  4. Patient will increase Lower Extremity and Core Strength to grossly 4+/5  5. Patient will decrease complaints of pain with activity to Less than or Equal to 5/10      Long Term Goals: 6 weeks   1. Patient will tolerate 30 minutes of activity with complaints of Pain at Less Than or Equal to 4/10      Plan     Pt will continue with POC.    Karime Corbin, PTA

## 2024-01-03 ENCOUNTER — PATIENT MESSAGE (OUTPATIENT)
Dept: FAMILY MEDICINE | Facility: CLINIC | Age: 59
End: 2024-01-03
Payer: COMMERCIAL

## 2024-01-06 DIAGNOSIS — M54.16 LUMBAR RADICULOPATHY: ICD-10-CM

## 2024-01-06 DIAGNOSIS — Z79.4 TYPE 2 DIABETES MELLITUS WITHOUT COMPLICATION, WITH LONG-TERM CURRENT USE OF INSULIN: ICD-10-CM

## 2024-01-06 DIAGNOSIS — E03.9 ACQUIRED HYPOTHYROIDISM: ICD-10-CM

## 2024-01-06 DIAGNOSIS — E11.9 TYPE 2 DIABETES MELLITUS WITHOUT COMPLICATION, WITH LONG-TERM CURRENT USE OF INSULIN: ICD-10-CM

## 2024-01-06 NOTE — TELEPHONE ENCOUNTER
Patient left a message on patient portal that her insurance has been changed to [employer] and her and her spouse need their medications changed and sent to [facility].  Good morning !  Sorry this is leydi long.  Our new insurance makes us use [employer].   No longer can use "[employer]  [facility].   So the following scripts I need sent to [facility] so I can order refillls.      Ene  Celecoxib 200mg  Euthyrox 113mg  Metformin 1000mg  Gabapentin 300mg 4 pills a day.       Hector matos 70  Celecoxib 200mg  Simvastatin 10mg  Citalopram 10mg  Losartan 100mg     If you have any questions its best to reach me on our home phone [phone].  Thank you so much !!

## 2024-01-07 RX ORDER — LEVOTHYROXINE SODIUM 112 UG/1
112 TABLET ORAL DAILY
Qty: 90 TABLET | Refills: 1 | Status: SHIPPED | OUTPATIENT
Start: 2024-01-07

## 2024-01-07 RX ORDER — CELECOXIB 200 MG/1
200 CAPSULE ORAL DAILY
Qty: 90 CAPSULE | Refills: 1 | Status: SHIPPED | OUTPATIENT
Start: 2024-01-07

## 2024-01-07 RX ORDER — GABAPENTIN 300 MG/1
300 CAPSULE ORAL 2 TIMES DAILY
Qty: 180 CAPSULE | Refills: 1 | Status: SHIPPED | OUTPATIENT
Start: 2024-01-07

## 2024-01-07 RX ORDER — METFORMIN HYDROCHLORIDE 1000 MG/1
1000 TABLET ORAL 2 TIMES DAILY
Qty: 180 TABLET | Refills: 1 | Status: SHIPPED | OUTPATIENT
Start: 2024-01-07

## 2024-01-08 DIAGNOSIS — M54.16 LUMBAR RADICULOPATHY: ICD-10-CM

## 2024-01-08 RX ORDER — CELECOXIB 200 MG/1
200 CAPSULE ORAL
Refills: 1 | OUTPATIENT
Start: 2024-01-08

## 2024-01-10 ENCOUNTER — PATIENT MESSAGE (OUTPATIENT)
Dept: FAMILY MEDICINE | Facility: CLINIC | Age: 59
End: 2024-01-10
Payer: COMMERCIAL

## 2024-01-11 ENCOUNTER — PATIENT MESSAGE (OUTPATIENT)
Dept: FAMILY MEDICINE | Facility: CLINIC | Age: 59
End: 2024-01-11
Payer: COMMERCIAL

## 2024-01-11 DIAGNOSIS — K21.9 GASTROESOPHAGEAL REFLUX DISEASE, UNSPECIFIED WHETHER ESOPHAGITIS PRESENT: ICD-10-CM

## 2024-01-11 RX ORDER — OMEPRAZOLE 40 MG/1
40 CAPSULE, DELAYED RELEASE ORAL DAILY
Qty: 90 CAPSULE | Refills: 1 | Status: SHIPPED | OUTPATIENT
Start: 2024-01-11 | End: 2024-07-09

## 2024-01-11 NOTE — TELEPHONE ENCOUNTER
Patient needs omeprazole sent in to her mail order pharmacy. [facility].

## 2024-01-12 ENCOUNTER — CLINICAL SUPPORT (OUTPATIENT)
Dept: REHABILITATION | Facility: HOSPITAL | Age: 59
End: 2024-01-12
Payer: COMMERCIAL

## 2024-01-12 DIAGNOSIS — M54.16 LUMBAR RADICULOPATHY: Primary | ICD-10-CM

## 2024-01-12 PROCEDURE — 97110 THERAPEUTIC EXERCISES: CPT

## 2024-01-16 NOTE — PROGRESS NOTES
She Disclaimer: This note has been generated using voice-recognition software. There may be typographical errors that have been missed during proof-reading        Patient ID: Ene Clemons is a 58 y.o. female.      Chief Complaint: Hip Pain (right) and Low-back Pain (By tailbone)      58-year-old female returns for re-evaluation of chronic lower back, hip and SI joint pain.  The pain started several years ago and  she was treated by Dr. Brunner from 2017 until 2023.  She received 2 SI joint injections with 100% pain relief for the duration of the local anesthetic but the SI radiofrequency procedure was denied per patient's insurance September 23, 2023.  A previous SI radiofrequency procedure 1 year ago provided greater than 80% pain relief for nearly a year.  Her symptoms have worsened and now radiates from the lower back to the groin, hips and lower extremities.  She notes weakness but denies falling, bowel or bladder involvement.  She has completed 5 sessions of PT,  but with worsening sacrum pain and difficulty sitting.  Her mobility has improved since physical therapy and she is able to walk and stand for longer periods of time.  X-rays revealed multilevel disc bulges, degenerative changes and anterolisthesis at L4-5, and degenerative arthritis of the hips and SI joints.  She has not received an updated MRI of the lumbar spine.  A 3rd SI joint injection was recommended after completion of physical therapy and if she receives a positive response then I will reschedule  a radiofrequency procedure.  I will also consider bilateral hip injections for bilateral hip pain and we discussed epidural steroid injection for lower back pain and radiculopathy.            Pain Assessment  Pain Assessment: 0-10  Pain Score:   7  Pain Location: Hip  Pain Orientation: Right  Pain Descriptors: Sharp  Pain Frequency: Constant/continuous  Pain Onset: Gradual  Clinical Progression: Gradually worsening  Aggravating Factors: Standing,  Walking, Other (Comment) (sitting)  Pain Intervention(s): Medication (See eMAR), Home medication, Heat applied      A's of Opioid Risk Assessment  Activity:Patient has difficulty performing ADL.   Analgesia:Patients pain is partially controlled by current medication.   Adverse Effects: Patient denies constipation or sedation.  Aberrant Behavior:  reviewed with no aberrant drug seeking/taking behavior.      Patient denies any suicidal or homicidal ideations    Physical Therapy/Home Exercise: yes 3 weeks,2-3 times per week     X-Ray Lumbar Complete Including Flex And Ext  Narrative: EXAMINATION:  XR LUMBAR SPINE AP and lateral VIEW WITH FLEX AND EXT    CLINICAL HISTORY:  Radiculopathy, lumbar region    TECHNIQUE:  AP and lateral views of the lumbar spine plus flexion extension views were performed.    COMPARISON:  05/12/2021    FINDINGS:  The vertebral body heights are maintained.  There is mild degree of anterolisthesis a 3-4 mm of L4 on L5.  Remainder of the sagittal alignment is maintained.  No significant change in alignment on dynamic imaging.  Mild to moderate degenerative endplate changes are seen most notably at L3-4 where there is endplate sclerosis, disc height loss, and osteophyte formation.  Moderate facet degeneration L3-4 through L5-S1 also with sclerosis and osteophyte formation.  Impression: Multilevel degenerative changes.    Electronically signed by: Jovanny Ariza  Date:    12/06/2023  Time:    13:05  X-Ray Hip 3 or 4 views Bilateral  Narrative: EXAMINATION:  XR SACROILIAC JOINTS COMPLETE; XR HIP 3 OR 4 VIEWS BILATERAL    CLINICAL HISTORY:  Sacroiliitis, not elsewhere classified    TECHNIQUE:  AP pelvis, AP and frogleg lateral of the right and left hip, AP and oblique views of the right and left sacroiliac joint.    COMPARISON:  None    FINDINGS:  No fracture.  There are mild-to-moderate degenerative changes seen of both sacroiliac joints and of both hips.  No dislocation.  Soft tissues  unremarkable.  Impression: Mild-to-moderate degenerative changes of the sacroiliac joints and of both hips.    Electronically signed by: Jovanny Ariza  Date:    12/06/2023  Time:    13:04  X-Ray Sacroiliac Joints Complete  Narrative: EXAMINATION:  XR SACROILIAC JOINTS COMPLETE; XR HIP 3 OR 4 VIEWS BILATERAL    CLINICAL HISTORY:  Sacroiliitis, not elsewhere classified    TECHNIQUE:  AP pelvis, AP and frogleg lateral of the right and left hip, AP and oblique views of the right and left sacroiliac joint.    COMPARISON:  None    FINDINGS:  No fracture.  There are mild-to-moderate degenerative changes seen of both sacroiliac joints and of both hips.  No dislocation.  Soft tissues unremarkable.  Impression: Mild-to-moderate degenerative changes of the sacroiliac joints and of both hips.    Electronically signed by: Jovanny Ariza  Date:    12/06/2023  Time:    13:04      Review of Systems   Constitutional: Negative.    HENT: Negative.     Eyes: Negative.    Respiratory: Negative.     Cardiovascular: Negative.    Gastrointestinal: Negative.    Endocrine: Negative.    Genitourinary: Negative.    Musculoskeletal:  Positive for arthralgias (Bilateral hips), back pain, gait problem and leg pain (BLE).   Integumentary:  Negative.   Hematological: Negative.    Psychiatric/Behavioral: Negative.               Past Medical History:   Diagnosis Date    Arthritis     Depression     Diabetic eye exam 02/27/2023    Dr. Dueñas [facility] [place]    Hyperlipidemia     Hypothyroidism     Thyroid disease      Past Surgical History:   Procedure Laterality Date    ADENOIDECTOMY      CHOLECYSTECTOMY      HYSTERECTOMY      TONSILLECTOMY      TOTAL THYROIDECTOMY       Social History     Socioeconomic History    Marital status:      Spouse name: Hector    Number of children: 0   Occupational History    Occupation: retired   Tobacco Use    Smoking status: Former     Current packs/day: 0.00     Average packs/day: 0.5 packs/day for 39.0 years  (19.5 ttl pk-yrs)     Types: Cigarettes     Start date:      Quit date:      Years since quittin.0     Passive exposure: Current    Smokeless tobacco: Never   Substance and Sexual Activity    Alcohol use: Not Currently    Drug use: Never    Sexual activity: Yes     Partners: Male     Social Determinants of Health     Financial Resource Strain: Low Risk  (2022)    Overall Financial Resource Strain (CARDIA)     Difficulty of Paying Living Expenses: Not hard at all   Food Insecurity: No Food Insecurity (2022)    Hunger Vital Sign     Worried About Running Out of Food in the Last Year: Never true     Ran Out of Food in the Last Year: Never true   Transportation Needs: No Transportation Needs (2022)    PRAPARE - Transportation     Lack of Transportation (Medical): No     Lack of Transportation (Non-Medical): No   Physical Activity: Inactive (2022)    Exercise Vital Sign     Days of Exercise per Week: 0 days     Minutes of Exercise per Session: 0 min   Stress: No Stress Concern Present (2022)    [employer] - Occupational Stress Questionnaire     Feeling of Stress : Not at all   Social Connections: Moderately Isolated (2022)    Social Connection and Isolation Panel [NHANES]     Frequency of Communication with Friends and Family: More than three times a week     Frequency of Social Gatherings with Friends and Family: More than three times a week     Attends Jew Services: Never     Active Member of Clubs or Organizations: No     Attends Club or Organization Meetings: Never     Marital Status:    Housing Stability: Low Risk  (2022)    Housing Stability Vital Sign     Unable to Pay for Housing in the Last Year: No     Number of Places Lived in the Last Year: 0     Unstable Housing in the Last Year: No     Family History   Problem Relation Age of Onset    No Known Problems Mother     Arthritis Father     Heart disease Father     Cancer  Father     No Known Problems Sister      Review of patient's allergies indicates:   Allergen Reactions    Doxycycline     Sulfa (sulfonamide antibiotics)      has a current medication list which includes the following prescription(s): celecoxib, citalopram, euthyrox, gabapentin, methocarbamol, omeprazole, simvastatin, acyclovir, duloxetine, gabapentin, hydrocodone-acetaminophen, and metformin.      Objective:  Vitals:    01/17/24 1110   BP: (!) 151/86   Pulse: 78   Resp: 18        Physical Exam  Vitals and nursing note reviewed.   Constitutional:       General: She is not in acute distress.     Appearance: Normal appearance. She is not ill-appearing, toxic-appearing or diaphoretic.   HENT:      Head: Normocephalic and atraumatic.      Nose: Nose normal.      Mouth/Throat:      Mouth: Mucous membranes are moist.   Eyes:      Extraocular Movements: Extraocular movements intact.      Pupils: Pupils are equal, round, and reactive to light.   Cardiovascular:      Rate and Rhythm: Normal rate and regular rhythm.      Heart sounds: Normal heart sounds.   Pulmonary:      Effort: Pulmonary effort is normal. No respiratory distress.      Breath sounds: Normal breath sounds. No stridor. No wheezing or rhonchi.   Abdominal:      General: Bowel sounds are normal.      Palpations: Abdomen is soft.   Musculoskeletal:         General: No swelling or deformity.      Cervical back: Normal and normal range of motion. No spasms or tenderness. No pain with movement. Normal range of motion.      Thoracic back: Normal.      Lumbar back: Tenderness and bony tenderness present. No spasms. Decreased range of motion. Negative right straight leg raise test and negative left straight leg raise test. No scoliosis.      Right lower leg: No edema.      Left lower leg: No edema.      Comments: Palpation of the bilateral  SI joint is painful.  ALECIA/ Dominic's test is positive bilaterally  Gaenslen/Yoeman's test is positive bilaterally2  Thigh  thrust test is positive bilaterally     Skin:     General: Skin is warm.   Neurological:      General: No focal deficit present.      Mental Status: She is alert and oriented to person, place, and time. Mental status is at baseline.      Cranial Nerves: No cranial nerve deficit.      Sensory: Sensation is intact. No sensory deficit.      Motor: No weakness.      Coordination: Coordination normal.      Gait: Gait normal.      Deep Tendon Reflexes: Reflexes are normal and symmetric.   Psychiatric:         Mood and Affect: Mood normal.         Behavior: Behavior normal.           Assessment:      1. Lumbar radiculopathy    2. Bilateral sacroiliitis    3. Chronic bilateral low back pain with bilateral sciatica          Plan:  1. reviewed  2.Addiction, Dependency, Tolerance, Opioid abuse-misuse, Death, Diversion Discussed. Overdose reversal drug Naloxone discussed.  3.Refill/Continue medications for pain control and function.  Increase Neurontin to 300 mg 2 tablets 3 times a day and Cymbalta 30 mg a day and adjust as tolerated for degenerative changes       Requested Prescriptions     Signed Prescriptions Disp Refills    DULoxetine (CYMBALTA) 30 MG capsule 90 capsule 1     Sig: Take 1 capsule (30 mg total) by mouth once daily.    gabapentin (NEURONTIN) 300 MG capsule 270 capsule 1     Sig: Take 2 capsules (600 mg total) by mouth 3 (three) times daily.     4. Continue physical therapy as prescribed 2 to 3 times week   5. Consider bilateral SI joint injections after completion of physical therapy, bilateral hip injections for bilateral hip pain and L3-4 epidural steroid injection for lumbar radiculopathy     report:  Reviewed and consistent with medication use as prescribed.      The total time spent for evaluation and management on 01/17/2024 including reviewing separately obtained history, performing a medically appropriate exam and evaluation, documenting clinical information in the health record, independently  interpreting results and communicating them to the patient/family/caregiver, and ordering medications/tests/procedures was between 15-29 minutes.    The above plan and management options were discussed at length with patient. Patient is in agreement with the above and verbalized understanding. It will be communicated with the referring physician via electronic record, fax, or mail.

## 2024-01-17 ENCOUNTER — OFFICE VISIT (OUTPATIENT)
Dept: PAIN MEDICINE | Facility: CLINIC | Age: 59
End: 2024-01-17
Payer: COMMERCIAL

## 2024-01-17 VITALS
RESPIRATION RATE: 18 BRPM | BODY MASS INDEX: 31.37 KG/M2 | HEART RATE: 78 BPM | SYSTOLIC BLOOD PRESSURE: 151 MMHG | DIASTOLIC BLOOD PRESSURE: 86 MMHG | WEIGHT: 207 LBS | HEIGHT: 68 IN

## 2024-01-17 DIAGNOSIS — G89.29 CHRONIC BILATERAL LOW BACK PAIN WITH BILATERAL SCIATICA: Chronic | ICD-10-CM

## 2024-01-17 DIAGNOSIS — M54.41 CHRONIC BILATERAL LOW BACK PAIN WITH BILATERAL SCIATICA: Chronic | ICD-10-CM

## 2024-01-17 DIAGNOSIS — M46.1 BILATERAL SACROILIITIS: Chronic | ICD-10-CM

## 2024-01-17 DIAGNOSIS — M54.16 LUMBAR RADICULOPATHY: Primary | Chronic | ICD-10-CM

## 2024-01-17 DIAGNOSIS — M54.42 CHRONIC BILATERAL LOW BACK PAIN WITH BILATERAL SCIATICA: Chronic | ICD-10-CM

## 2024-01-17 PROCEDURE — 99214 OFFICE O/P EST MOD 30 MIN: CPT | Mod: PBBFAC | Performed by: PAIN MEDICINE

## 2024-01-17 PROCEDURE — 99214 OFFICE O/P EST MOD 30 MIN: CPT | Mod: S$PBB,,, | Performed by: PAIN MEDICINE

## 2024-01-17 RX ORDER — DULOXETIN HYDROCHLORIDE 30 MG/1
30 CAPSULE, DELAYED RELEASE ORAL DAILY
Qty: 90 CAPSULE | Refills: 1 | Status: SHIPPED | OUTPATIENT
Start: 2024-01-17 | End: 2024-04-04 | Stop reason: SDUPTHER

## 2024-01-17 RX ORDER — GABAPENTIN 300 MG/1
600 CAPSULE ORAL 3 TIMES DAILY
Qty: 270 CAPSULE | Refills: 1 | Status: SHIPPED | OUTPATIENT
Start: 2024-01-17 | End: 2024-04-04 | Stop reason: SDUPTHER

## 2024-01-19 ENCOUNTER — CLINICAL SUPPORT (OUTPATIENT)
Dept: REHABILITATION | Facility: HOSPITAL | Age: 59
End: 2024-01-19
Payer: COMMERCIAL

## 2024-01-19 DIAGNOSIS — M54.16 LUMBAR RADICULOPATHY: Primary | ICD-10-CM

## 2024-01-19 PROCEDURE — 97110 THERAPEUTIC EXERCISES: CPT | Mod: CQ

## 2024-01-19 NOTE — PROGRESS NOTES
[facility]   Physical Therapy Treatment Note      Name: Ene Clemons  Clinic Number: [id number]    Therapy Diagnosis:   Encounter Diagnosis   Name Primary?    Lumbar radiculopathy Yes       Physician: Radhika Neff MD    Visit Date: 1/19/2024    Physician Orders: PT Eval and Treat   Medical Diagnosis from Referral:   M54.16 (ICD-10-CM) - Lumbar radiculopathy   M46.1 (ICD-10-CM) - Bilateral sacroiliitis      Evaluation Date: 12/14/2023  Plan of Care Expiration: 1/25/2024  Progress Note Due: 1/12/2024  Plan of care Certification: 12/14/2023 to 1/25/2024           .  Date of Surgery: N/A  Visit # / Visits authorized: 6/ 12   FOTO: 43/ 100     Precautions: Standard      Time In: 12:20 PM  Time Out: 1300  PM  Total Billable Time:40  minutes    PTA Visit #: 1/5       Subjective     Patient reports: mild pain in center of lumbar spine  She was compliant with home exercise program.      Pain: 5/10 R hip and lower lumbar   Location: bilateral back/hip    Objective      Objective Measures updated at progress report unless specified.     Treatment     Ene received the treatments listed below:      therapeutic exercises to develop strength, endurance, ROM, and flexibility for  40   minutes including:  Sci Fit Stepper x 5 min  Bird Dogs x30 (increased center of lower back pain) (completed x 10 today)  Cat/cow 1x10   Sciatic Nerve Glides x40 each LE  Open Books x10 BL   Dead Bugs x10 BL   Bridging w/ piriformis stretch x15 BL  Pelvic tilts 2x10 with 3 sec holds  Lumbar rotation with swiss ball 1x15 with 3 sec holds   Swiss Ball Rolls (supine) (flexion) x20  Hip ADD with ball 2x10 with 3 sec holds  Hip ABD with TB 2x10 with TB  [place] Stretch 4x20 secs  Seated ball roll outs 5 x10  Seated piriformis stretch/hip flexor stretch 5x10       (Not this visit)  manual therapy techniques were applied to the bilateral lumbar paraspinals  for  minutes, including:        (Not this visit)  supervised  modalities after being cleared for contradictions: IFC Electrical Stimulation:  Ene received IFC Electrical Stimulation for pain control applied to the     . Pt received stimulation at 100 % scan at a frequency of       for       minutes. Ene tolderated treatment well without any adverse effects.      hot pack for     minutes to .    cold pack for       minutes to .    Patient Education and Home Exercises       Education provided: yes    Written Home Exercises Provided: yes. Exercises were reviewed and Ene was able to demonstrate them prior to the end of the session.  Ene demonstrated good  understanding of the education provided. See Electronic Medical Record under Patient Instructions for exercises provided during therapy sessions    Assessment     Pt tolerated Tx well today to promote increased lumbar ROM and strengthening.  PTA provided skilled cues for correct posture/positioning with all exercises.     Ene Is progressing well towards her goals.   Patient prognosis is Excellent.     Patient will continue to benefit from skilled outpatient physical therapy to address the deficits listed in the problem list box on initial evaluation, provide pt/family education and to maximize pt's level of independence in the home and community environment.     Patient's spiritual, cultural and educational needs considered and pt agreeable to plan of care and goals.     Anticipated barriers to physical therapy: none    Goals:  Short Term Goals: 4 weeks   1. Patient will be Independent with Home Exercise Program   2. Patient will demonstrate with improved Posture and Body Mechanics  3. Patient will increase Lumbosacral Range of Motion by 25% pain free  4. Patient will increase Lower Extremity and Core Strength to grossly 4+/5  5. Patient will decrease complaints of pain with activity to Less than or Equal to 5/10      Long Term Goals: 6 weeks   1. Patient will tolerate 30 minutes of activity with complaints of Pain at  Less Than or Equal to 4/10      Plan     Pt will continue with POC.    Karime Corbin, PTA

## 2024-01-23 ENCOUNTER — CLINICAL SUPPORT (OUTPATIENT)
Dept: REHABILITATION | Facility: HOSPITAL | Age: 59
End: 2024-01-23
Payer: COMMERCIAL

## 2024-01-23 DIAGNOSIS — M54.16 LUMBAR RADICULOPATHY: Primary | ICD-10-CM

## 2024-01-23 PROCEDURE — 97110 THERAPEUTIC EXERCISES: CPT | Mod: CQ

## 2024-01-23 NOTE — PROGRESS NOTES
[facility]   Physical Therapy Treatment Note      Name: Ene Clemons  Clinic Number: [id number]    Therapy Diagnosis:   Encounter Diagnosis   Name Primary?    Lumbar radiculopathy Yes       Physician: Radhika Neff MD    Visit Date: 1/23/2024    Physician Orders: PT Eval and Treat   Medical Diagnosis from Referral:   M54.16 (ICD-10-CM) - Lumbar radiculopathy   M46.1 (ICD-10-CM) - Bilateral sacroiliitis      Evaluation Date: 12/14/2023  Plan of Care Expiration: 1/25/2024  Progress Note Due: 1/12/2024  Plan of care Certification: 12/14/2023 to 1/25/2024           .  Date of Surgery: N/A  Visit # / Visits authorized: 7/ 12   FOTO: 43/ 100     Precautions: Standard      Time In: 1100 AM  Time Out: 1145 AM  Total Billable Time: 45  minutes    PTA Visit #: 1/5       Subjective     Patient reports: hip/back pain and increased nerve pain    She was compliant with home exercise program.      Pain: 5/10 R hip and lower lumbar , nerve pain 8/10  Location: bilateral back/hip    Objective      Objective Measures updated at progress report unless specified.     Treatment     Ene received the treatments listed below:      therapeutic exercises to develop strength, endurance, ROM, and flexibility for  45 minutes including:  Sci Fit Stepper x 5 min  Bird Dogs x30 (increased center of lower back pain) (completed x 10 today)  Cat/cow 1x15  Sciatic Nerve Glides x20 each LE  Open Books x10 BL   Dead Bugs x15 BL   Bridging w/ piriformis stretch x15 BL  Pelvic tilts 2x10 with 3 sec holds  Lumbar rotation with swiss ball 1x15 with 3 sec holds   Swiss Ball Rolls (supine) (flexion) x 20  Hip ADD with ball 2x10 with 3 sec holds  Hip ABD with TB 2x10 with TB  [place] Stretch 4x20 secs  Seated ball roll outs 5 x10  Prone prop x 2 min  Seated piriformis stretch/hip flexor stretch 5x10       (Not this visit)  manual therapy techniques were applied to the bilateral lumbar paraspinals  for  minutes,  including:        (Not this visit)  supervised modalities after being cleared for contradictions: IFC Electrical Stimulation:  Ene received IFC Electrical Stimulation for pain control applied to the     . Pt received stimulation at 100 % scan at a frequency of       for       minutes. Ene tolderated treatment well without any adverse effects.      hot pack for     minutes to .    cold pack for       minutes to .    Patient Education and Home Exercises       Education provided: yes    Written Home Exercises Provided: yes. Exercises were reviewed and Ene was able to demonstrate them prior to the end of the session.  Ene demonstrated good  understanding of the education provided. See Electronic Medical Record under Patient Instructions for exercises provided during therapy sessions    Assessment     Pt tolerated Tx well today to promote increased lumbar ROM and strengthening.  PTA provided skilled cues for correct posture/positioning with all exercises.     Ene Is progressing well towards her goals.   Patient prognosis is Excellent.     Patient will continue to benefit from skilled outpatient physical therapy to address the deficits listed in the problem list box on initial evaluation, provide pt/family education and to maximize pt's level of independence in the home and community environment.     Patient's spiritual, cultural and educational needs considered and pt agreeable to plan of care and goals.     Anticipated barriers to physical therapy: none    Goals:  Short Term Goals: 4 weeks   1. Patient will be Independent with Home Exercise Program   2. Patient will demonstrate with improved Posture and Body Mechanics  3. Patient will increase Lumbosacral Range of Motion by 25% pain free  4. Patient will increase Lower Extremity and Core Strength to grossly 4+/5  5. Patient will decrease complaints of pain with activity to Less than or Equal to 5/10      Long Term Goals: 6 weeks   1. Patient will tolerate 30  minutes of activity with complaints of Pain at Less Than or Equal to 4/10      Plan     Pt will continue with POC.    Karime Corbin, PTA

## 2024-01-26 ENCOUNTER — CLINICAL SUPPORT (OUTPATIENT)
Dept: REHABILITATION | Facility: HOSPITAL | Age: 59
End: 2024-01-26
Payer: COMMERCIAL

## 2024-01-26 DIAGNOSIS — M54.16 LUMBAR RADICULOPATHY: Primary | ICD-10-CM

## 2024-01-26 PROCEDURE — 97110 THERAPEUTIC EXERCISES: CPT

## 2024-01-26 NOTE — PLAN OF CARE
[facility]  Physical Therapy Plan of Care Note     Name: Ene Clemons  Clinic Number: [id number]    Therapy Diagnosis:   Encounter Diagnosis   Name Primary?    Lumbar radiculopathy Yes     Physician: Radhika Neff MD    Visit Date: 1/26/2024    Physician Orders: PT Eval and Treat   Medical Diagnosis from Referral:   M54.16 (ICD-10-CM) - Lumbar radiculopathy   M46.1 (ICD-10-CM) - Bilateral sacroiliitis      Evaluation Date: 12/14/2023  Plan of Care Expiration: 1/25/2024  Progress Note Due: 1/12/2024  Date of Surgery: N/A  Visit # / Visits authorized: 9/ 12   FOTO: 43/ 100    Precautions: Standard  Functional Level Prior to Evaluation:      Subjective     Update: Patient states her back feels better and is is more mobile however there have been little to no changes to her radicular pain. She also continues to note pain in her groin with certain interventions.     Objective      Update: Gross MMT: 4/5 BLE, 45% improvement with pain free range of motion for lumbar spine    Assessment       Previous Short Term Goals Status:   Achieved  New Short Term Goals Status:   N/A  Long Term Goal Status: continue per initial plan of care.  Reasons for Recertification of Therapy:   reduce radicular symptoms and increase activity tolerance with functional activities that previously exacerbated her pain.     GOALS  Short Term Goals: 4 weeks Achieved  1. Patient will be Independent with Home Exercise Program   2. Patient will demonstrate with improved Posture and Body Mechanics  3. Patient will increase Lumbosacral Range of Motion by 25% pain free  4. Patient will increase Lower Extremity and Core Strength to grossly 4+/5  5. Patient will decrease complaints of pain with activity to Less than or Equal to 5/10      Long Term Goals: 6 weeks   1. Patient will tolerate 30 minutes of activity with complaints of Pain at Less Than or Equal to 4/10  In Progress    Plan     Updated Certification Period: 1/26/2024  to 2/15/2024   Recommended Treatment Plan: 2 times per week for 3 weeks:  Gait Training, Manual Therapy, Moist Heat/ Ice, Neuromuscular Re-ed, Patient Education, Therapeutic Activities, and Therapeutic Exercise  Other Recommendations:     Demario Figueroa PT

## 2024-01-26 NOTE — PROGRESS NOTES
[facility]   Physical Therapy Treatment Note      Name: Ene Clemons  Clinic Number: [id number]    Therapy Diagnosis:   Encounter Diagnosis   Name Primary?    Lumbar radiculopathy Yes       Physician: Radhika Neff MD    Visit Date: 1/26/2024    Physician Orders: PT Eval and Treat   Medical Diagnosis from Referral:   M54.16 (ICD-10-CM) - Lumbar radiculopathy   M46.1 (ICD-10-CM) - Bilateral sacroiliitis      Evaluation Date: 12/14/2023  Plan of Care Expiration: 1/25/2024  Progress Note Due: 1/12/2024  Plan of care Certification: 12/14/2023 to 1/25/2024           .  Date of Surgery: N/A  Visit # / Visits authorized: 8/ 12   FOTO: 43/ 100     Precautions: Standard      Time In: 12:30 PM  Time Out: 1:10 PM  Total Billable Time: 40  minutes    PTA Visit #: 1/5       Subjective     Patient reports: hip/back pain and increased nerve pain    She was compliant with home exercise program.      Pain: 5/10 R hip and lower lumbar , nerve pain 8/10  Location: bilateral back/hip    Objective      Objective Measures updated at progress report unless specified.     Treatment     Ene received the treatments listed below:      therapeutic exercises to develop strength, endurance, ROM, and flexibility for  40 minutes including:  Sci Fit Stepper x 5 min  Leg Press x30 60#  Standing Hip Extension x20 BL  Dead Bugs x15 BL   Bridging x30  Standing TFL Stretch 5x10 secs  Pelvic tilts 2x10 with 3 sec holds  Lumbar rotation with swiss ball 1x15 with 3 sec holds   Swiss Ball Rolls (supine) (flexion) x 20  Hip ADD with ball 2x10 with 3 sec holds  Hip ABD with TB 2x10 with TB  Prone Quad Stretch 4x30 secs  Seated ball roll outs 5 x10  Prone prop x 2 min         (Not this visit)  manual therapy techniques were applied to the bilateral lumbar paraspinals  for  minutes, including:        (Not this visit)  supervised modalities after being cleared for contradictions: IFC Electrical Stimulation:  Ene received IFC  Electrical Stimulation for pain control applied to the     . Pt received stimulation at 100 % scan at a frequency of       for       minutes. Ene tolderated treatment well without any adverse effects.      hot pack for     minutes to .    cold pack for       minutes to .    Patient Education and Home Exercises       Education provided: yes    Written Home Exercises Provided: yes. Exercises were reviewed and Ene was able to demonstrate them prior to the end of the session.  Ene demonstrated good  understanding of the education provided. See Electronic Medical Record under Patient Instructions for exercises provided during therapy sessions    Assessment     Pt tolerated Tx well today to promote increased lumbar ROM and strengthening.  PT provided skilled cues for correct posture/positioning with all exercises.     Ene Is progressing well towards her goals.   Patient prognosis is Excellent.     Patient will continue to benefit from skilled outpatient physical therapy to address the deficits listed in the problem list box on initial evaluation, provide pt/family education and to maximize pt's level of independence in the home and community environment.     Patient's spiritual, cultural and educational needs considered and pt agreeable to plan of care and goals.     Anticipated barriers to physical therapy: none    Goals:  Short Term Goals: 4 weeks   1. Patient will be Independent with Home Exercise Program   2. Patient will demonstrate with improved Posture and Body Mechanics  3. Patient will increase Lumbosacral Range of Motion by 25% pain free  4. Patient will increase Lower Extremity and Core Strength to grossly 4+/5  5. Patient will decrease complaints of pain with activity to Less than or Equal to 5/10      Long Term Goals: 6 weeks   1. Patient will tolerate 30 minutes of activity with complaints of Pain at Less Than or Equal to 4/10      Plan     Pt will continue with POC.    Demario Figueroa, PT

## 2024-02-06 ENCOUNTER — CLINICAL SUPPORT (OUTPATIENT)
Dept: REHABILITATION | Facility: HOSPITAL | Age: 59
End: 2024-02-06
Payer: COMMERCIAL

## 2024-02-06 DIAGNOSIS — M54.16 LUMBAR RADICULOPATHY: Primary | ICD-10-CM

## 2024-02-06 PROCEDURE — 97110 THERAPEUTIC EXERCISES: CPT | Mod: CQ

## 2024-02-06 NOTE — PROGRESS NOTES
"[facility]   Physical Therapy Treatment Note      Name: Ene Clemons  Clinic Number: [id number]    Therapy Diagnosis:   Encounter Diagnosis   Name Primary?    Lumbar radiculopathy Yes       Physician: Radhika Neff MD    Visit Date: 2/6/2024    Physician Orders: PT Eval and Treat   Medical Diagnosis from Referral:   M54.16 (ICD-10-CM) - Lumbar radiculopathy   M46.1 (ICD-10-CM) - Bilateral sacroiliitis      Evaluation Date: 12/14/2023  Plan of Care Expiration: 1/25/2024  Progress Note Due: 1/12/2024  Plan of care Certification: 12/14/2023 to 1/25/2024           .  Date of Surgery: N/A  Visit # / Visits authorized: 9/ 12   FOTO: 43/ 100     Precautions: Standard      Time In: 1015 AM  Time Out: 1100 AM  Total Billable Time:  45 minutes    PTA Visit #: 1/5       Subjective     Patient reports: "just soreness mainly today"   She was compliant with home exercise program.      Pain: 3/10   Location: bilateral back/hip    Objective      Objective Measures updated at progress report unless specified.     Treatment     Ene received the treatments listed below:      therapeutic exercises to develop strength, endurance, ROM, and flexibility for  45 minutes including:  Sci Fit Stepper x 5 min  Leg Press x30 60#   Standing Hip Extension x20 BL with 3# cw  Dead Bugs x15 BL   Bridging x30 from swiss ball  Standing TFL Stretch 5x10 secs  Pelvic tilts 2x10 with 3 sec holds  Lumbar rotation with swiss ball 1x15 with 3 sec holds   Swiss Ball Rolls (supine) (flexion) x 20  Hip ADD with ball 2x10 with 3 sec holds  Hip ABD with TB 2x10   Prone Quad Stretch 4x30 secs  Seated ball roll outs 5 x10  Prone prop x 2 min         (Not this visit)  manual therapy techniques were applied to the bilateral lumbar paraspinals  for  minutes, including:        (Not this visit)  supervised modalities after being cleared for contradictions: IFC Electrical Stimulation:  Ene received IFC Electrical Stimulation for pain " control applied to the     . Pt received stimulation at 100 % scan at a frequency of       for       minutes. Ene tolderated treatment well without any adverse effects.      hot pack for     minutes to .    cold pack for       minutes to .    Patient Education and Home Exercises       Education provided: yes    Written Home Exercises Provided: yes. Exercises were reviewed and Ene was able to demonstrate them prior to the end of the session.  Ene demonstrated good  understanding of the education provided. See Electronic Medical Record under Patient Instructions for exercises provided during therapy sessions    Assessment     Pt tolerated Tx well today to promote increased lumbar ROM and strengthening.  PTA provided skilled cues for correct posture/positioning with all exercises.     Ene Is progressing well towards her goals.   Patient prognosis is Excellent.     Patient will continue to benefit from skilled outpatient physical therapy to address the deficits listed in the problem list box on initial evaluation, provide pt/family education and to maximize pt's level of independence in the home and community environment.     Patient's spiritual, cultural and educational needs considered and pt agreeable to plan of care and goals.     Anticipated barriers to physical therapy: none    Goals:  Short Term Goals: 4 weeks   1. Patient will be Independent with Home Exercise Program   2. Patient will demonstrate with improved Posture and Body Mechanics  3. Patient will increase Lumbosacral Range of Motion by 25% pain free  4. Patient will increase Lower Extremity and Core Strength to grossly 4+/5  5. Patient will decrease complaints of pain with activity to Less than or Equal to 5/10      Long Term Goals: 6 weeks   1. Patient will tolerate 30 minutes of activity with complaints of Pain at Less Than or Equal to 4/10      Plan   Updated Certification Period: 1/26/2024 to 2/15/2024   Recommended Treatment Plan: 2 times  per week for 3 weeks:  Gait Training, Manual Therapy, Moist Heat/ Ice, Neuromuscular Re-ed, Patient Education, Therapeutic Activities, and Therapeutic Exercise  Pt will continue with POC.    Karime Corbin, PTA

## 2024-02-09 ENCOUNTER — CLINICAL SUPPORT (OUTPATIENT)
Dept: REHABILITATION | Facility: HOSPITAL | Age: 59
End: 2024-02-09
Payer: COMMERCIAL

## 2024-02-09 DIAGNOSIS — M54.16 LUMBAR RADICULOPATHY: Primary | ICD-10-CM

## 2024-02-09 PROCEDURE — 97110 THERAPEUTIC EXERCISES: CPT | Mod: CQ

## 2024-02-09 NOTE — PROGRESS NOTES
[facility]   Physical Therapy Treatment Note      Name: Ene Clemons  Clinic Number: [id number]    Therapy Diagnosis:   Encounter Diagnosis   Name Primary?    Lumbar radiculopathy Yes       Physician: Radhika Neff MD    Visit Date: 2/9/2024    Physician Orders: PT Eval and Treat   Medical Diagnosis from Referral:   M54.16 (ICD-10-CM) - Lumbar radiculopathy   M46.1 (ICD-10-CM) - Bilateral sacroiliitis      Evaluation Date: 12/14/2023  Plan of Care Expiration: 1/25/2024  Progress Note Due: 1/12/2024  Plan of care Certification: 12/14/2023 to 1/25/2024           .  Date of Surgery: N/A  Visit # / Visits authorized: 9/ 12   FOTO: 43/ 100     Precautions: Standard      Time In: 1315 AM  Time Out: 1400 AM  Total Billable Time:  45 minutes    PTA Visit #: 2/5       Subjective     Patient reports: no pain today.  She was compliant with home exercise program.      Pain: 0/10   Location: bilateral back/hip    Objective      Objective Measures updated at progress report unless specified.     Treatment     Ene received the treatments listed below:      therapeutic exercises to develop strength, endurance, ROM, and flexibility for  45 minutes including:  Sci Fit Stepper x 5 min  Standing Hip Extension x20 BL with 3# cw  Dead Bugs x15 BL   Bridging x30 from swiss ball  Standing TFL Stretch 5x10 secs  Pelvic tilts 2x10 with 3 sec holds  Lumbar rotation with swiss ball 1x15 with 3 sec holds   Swiss Ball Rolls (supine) (flexion) x 20  Hip ADD with ball 3x10 with 3 sec holds  Hip ABD with  BTB 3x10   Prone Quad Stretch 4x30 secs  Seated ball roll outs 5 x10  Prone prop x 2 min         (Not this visit)  manual therapy techniques were applied to the bilateral lumbar paraspinals  for  minutes, including:        (Not this visit)  supervised modalities after being cleared for contradictions: IFC Electrical Stimulation:  Ene received IFC Electrical Stimulation for pain control applied to the     . Pt  received stimulation at 100 % scan at a frequency of       for       minutes. Ene tolderated treatment well without any adverse effects.      hot pack for     minutes to .    cold pack for       minutes to .    Patient Education and Home Exercises       Education provided: yes    Written Home Exercises Provided: yes. Exercises were reviewed and Ene was able to demonstrate them prior to the end of the session.  Ene demonstrated good  understanding of the education provided. See Electronic Medical Record under Patient Instructions for exercises provided during therapy sessions    Assessment     Pt tolerated Tx well today to promote increased lumbar ROM and strengthening.  PTA provided skilled cues for correct posture/positioning with all exercises.     Ene Is progressing well towards her goals.   Patient prognosis is Excellent.     Patient will continue to benefit from skilled outpatient physical therapy to address the deficits listed in the problem list box on initial evaluation, provide pt/family education and to maximize pt's level of independence in the home and community environment.     Patient's spiritual, cultural and educational needs considered and pt agreeable to plan of care and goals.     Anticipated barriers to physical therapy: none    Goals:  Short Term Goals: 4 weeks   1. Patient will be Independent with Home Exercise Program.  Goal Met  2. Patient will demonstrate with improved Posture and Body Mechanics Goal Met  3. Patient will increase Lumbosacral Range of Motion by 25% pain free  4. Patient will increase Lower Extremity and Core Strength to grossly 4+/5  5. Patient will decrease complaints of pain with activity to Less than or Equal to 5/10.  Goal Met      Long Term Goals: 6 weeks   1. Patient will tolerate 30 minutes of activity with complaints of Pain at Less Than or Equal to 4/10.       Plan   Updated Certification Period: 1/26/2024 to 2/15/2024   Recommended Treatment Plan: 2 times  per week for 3 weeks:  Gait Training, Manual Therapy, Moist Heat/ Ice, Neuromuscular Re-ed, Patient Education, Therapeutic Activities, and Therapeutic Exercise  Pt will continue with POC.    Karime Corbin, PTA

## 2024-02-12 ENCOUNTER — DOCUMENTATION ONLY (OUTPATIENT)
Dept: REHABILITATION | Facility: HOSPITAL | Age: 59
End: 2024-02-12
Payer: COMMERCIAL

## 2024-02-12 ENCOUNTER — CLINICAL SUPPORT (OUTPATIENT)
Dept: REHABILITATION | Facility: HOSPITAL | Age: 59
End: 2024-02-12
Payer: COMMERCIAL

## 2024-02-12 DIAGNOSIS — M54.16 LUMBAR RADICULOPATHY: Primary | ICD-10-CM

## 2024-02-12 PROCEDURE — 97110 THERAPEUTIC EXERCISES: CPT | Mod: CQ

## 2024-02-12 NOTE — PROGRESS NOTES
[facility]   Physical Therapy Treatment Note      Name: Ene Clemons  Clinic Number: [id number]    Therapy Diagnosis:   Encounter Diagnosis   Name Primary?    Lumbar radiculopathy Yes       Physician: Radhika Neff MD    Visit Date: 2/12/2024    Physician Orders: PT Eval and Treat   Medical Diagnosis from Referral:   M54.16 (ICD-10-CM) - Lumbar radiculopathy   M46.1 (ICD-10-CM) - Bilateral sacroiliitis      Evaluation Date: 12/14/2023  Plan of Care Expiration: 1/25/2024  Progress Note Due: 1/12/2024  Plan of care Certification: 12/14/2023 to 1/25/2024           .  Date of Surgery: N/A  Visit # / Visits authorized: 9/ 12   FOTO: 43/ 100     Precautions: Standard      Time In: 858 AM  Time Out: 915  AM  Total Billable Time:  17 minutes    PTA Visit #: 2/5       Subjective     Patient reports: no pain today.  She was compliant with home exercise program.      Pain: 0/10   Location: bilateral back/hip    Objective      MANUAL MUSCLE TEST  Right left   Hip flexion MMT number: 4+/5 MMT strength: 4+/5   Hip abduction MMT strength: 4+/5 MMT strength: 4/5   Knee extension MMT strength: 5/5 MMT strength: 5/5   Knee flexion  MMT strength: 4+/5 MMT strength: 4+/5   Ankle dorsiflexion MMT strength: 5/5 MMT strength: 5/5   Ankle plantar flexion  MMT strength: 5/5 MMT strength: 5/5   Extensor hallucis longus MMT strength: 5/5 MMT strength: 5/5      Abdominal MMT: 4  Lumbar extensors: 3+    Forward bend: WFL  Back bend: WFL  Lateral trunk lean: right 32 left 32  Trunk rotation: right 65 left 64    Treatment     Ene received the treatments listed below:      therapeutic exercises to develop strength, endurance, ROM, and flexibility for 17 minutes including:  Sci Fit Stepper x 5 min  Dead Bugs x15 BL   Bridging x30 from swiss ball  Pelvic tilts 2x10 with 3 sec holds  Lumbar rotation with swiss ball 1x15 with 3 sec holds           Patient Education and Home Exercises       Education provided:  discussed goals and objective measurements with patient.    Written Home Exercises Provided: Patient instructed to cont prior HEP.   Assessment     Ene has progressed well with therapy and has met many of her goals. She will discharge from therapy services today.    Ene Is progressing well towards her goals.   Patient prognosis is Excellent.        Patient's spiritual, cultural and educational needs considered and pt agreeable to plan of care and goals.     Anticipated barriers to physical therapy: none    Goals:  Short Term Goals: 4 weeks   1. Patient will be Independent with Home Exercise Program.  Goal Met  2. Patient will demonstrate with improved Posture and Body Mechanics Goal Met  3. Patient will increase Lumbosacral Range of Motion by 25% pain free. Goal Met.  4. Patient will increase Lower Extremity and Core Strength to grossly 4+/5. Improved.  5. Patient will decrease complaints of pain with activity to Less than or Equal to 5/10.  Goal Met      Long Term Goals: 6 weeks   1. Patient will tolerate 30 minutes of activity with complaints of Pain at Less Than or Equal to 4/10.   Goal Met.    Plan   Updated Certification Period: 1/26/2024 to 2/15/2024   Recommended Treatment Plan: 2 times per week for 3 weeks:  Gait Training, Manual Therapy, Moist Heat/ Ice, Neuromuscular Re-ed, Patient Education, Therapeutic Activities, and Therapeutic Exercise  Pt will continue with POC.    Hudson Carr, PT

## 2024-02-12 NOTE — PROGRESS NOTES
[facility]  Physical Therapy Discharge Note    Name: Ene Clemons  Clinic Number: [id number]    Physician: Radhika Neff MD     Visit Date: 2/12/2024     Physician Orders: PT Eval and Treat   Medical Diagnosis from Referral:   M54.16 (ICD-10-CM) - Lumbar radiculopathy   M46.1 (ICD-10-CM) - Bilateral sacroiliitis      Evaluation Date: 12/14/2023  Plan of Care Expiration: 1/25/2024  Progress Note Due: 1/12/2024  Plan of care Certification: 12/14/2023 to 1/25/2024           .  Date of Surgery: N/A  Visit # / Visits authorized: 12  FOTO: 43/ 100  Evaluation Date: 12/14/24  Date of Last visit: 2/12/24  Total Visits Received: 12    Objective       MANUAL MUSCLE TEST  Right left   Hip flexion MMT number: 4+/5 MMT strength: 4+/5   Hip abduction MMT strength: 4+/5 MMT strength: 4/5   Knee extension MMT strength: 5/5 MMT strength: 5/5   Knee flexion  MMT strength: 4+/5 MMT strength: 4+/5   Ankle dorsiflexion MMT strength: 5/5 MMT strength: 5/5   Ankle plantar flexion  MMT strength: 5/5 MMT strength: 5/5   Extensor hallucis longus MMT strength: 5/5 MMT strength: 5/5      Abdominal MMT: 4  Lumbar extensors: 3+     Forward bend: WFL  Back bend: WFL  Lateral trunk lean: right 32 left 32  Trunk rotation: right 65 left 64    ASSESSMENT      Ene has progressed well with therapy and has met many of her goals. She will discharge from therapy services today.     Discharge reason: Patient has reached the maximum rehab potential for the present time    Discharge FOTO Score: 43    Goals:     Short Term Goals: 4 weeks   1. Patient will be Independent with Home Exercise Program.  Goal Met  2. Patient will demonstrate with improved Posture and Body Mechanics Goal Met  3. Patient will increase Lumbosacral Range of Motion by 25% pain free. Goal Met.  4. Patient will increase Lower Extremity and Core Strength to grossly 4+/5. Improved.  5. Patient will decrease complaints of pain with activity to Less than or  Equal to 5/10.  Goal Met      Long Term Goals: 6 weeks   1. Patient will tolerate 30 minutes of activity with complaints of Pain at Less Than or Equal to 4/10.      PLAN   This patient is discharged from Physical Therapy      Hudson Carr, PT

## 2024-02-12 NOTE — PROGRESS NOTES
[facility]   Physical Therapy Treatment Note      Name: Ene Clemons  Clinic Number: [id number]    Therapy Diagnosis:   Encounter Diagnosis   Name Primary?    Lumbar radiculopathy Yes       Physician: Radhika Neff MD    Visit Date: 2/12/2024    Physician Orders: PT Eval and Treat   Medical Diagnosis from Referral:   M54.16 (ICD-10-CM) - Lumbar radiculopathy   M46.1 (ICD-10-CM) - Bilateral sacroiliitis      Evaluation Date: 12/14/2023  Plan of Care Expiration: 1/25/2024  Progress Note Due: 1/12/2024  Plan of care Certification: 12/14/2023 to 1/25/2024           .  Date of Surgery: N/A  Visit # / Visits authorized: 10/ 12   FOTO: 43/ 100     Precautions: Standard      Time In: 0915  AM  Time Out: 0945 AM  Total Billable Time:  30 minutes    PTA Visit #: 3/5       Subjective     Patient reports: no pain today.  She was compliant with home exercise program.      Pain: 0/10   Location: bilateral back/hip    Objective      Objective Measures updated at progress report unless specified.     Treatment     Ene received the treatments listed below:      therapeutic exercises to develop strength, endurance, ROM, and flexibility for  30 minutes including:  Sci Fit Stepper x 5 min (not today)   Standing Hip Extension x20 BL with 3# cw  Dead Bugs x15 BL (not today)  Bridging x30 from swiss ball (not today)  Standing TFL Stretch 5x10 secs  Pelvic tilts 2x10 with 3 sec holds  Lumbar rotation with swiss ball 1x15 with 3 sec holds (not today)  Swiss Ball Rolls (supine) (flexion) x 30  Hip ADD with ball 3x10 with 3 sec holds   Hip ABD with  BTB 3x10 with 3 sec holds  Prone Quad Stretch 4x30 secs  Seated ball roll outs 5 x10  Prone prop x 2 min          (Not this visit)  manual therapy techniques were applied to the bilateral lumbar paraspinals  for  minutes, including:        (Not this visit)  supervised modalities after being cleared for contradictions: IFC Electrical Stimulation:  Ene received  IFC Electrical Stimulation for pain control applied to the     . Pt received stimulation at 100 % scan at a frequency of       for       minutes. Ene tolderated treatment well without any adverse effects.      hot pack for     minutes to .    cold pack for       minutes to .    Patient Education and Home Exercises       Education provided: yes    Written Home Exercises Provided: yes. Exercises were reviewed and Ene was able to demonstrate them prior to the end of the session.  Ene demonstrated good  understanding of the education provided. See Electronic Medical Record under Patient Instructions for exercises provided during therapy sessions    Assessment     Pt tolerated Tx well today to promote increased lumbar ROM and strengthening.  PTA provided skilled cues for correct posture/positioning with all exercises.  Pt will be assessed by PT, Hudson Carr, and D/C today.      Ene Is progressing well towards her goals.   Patient prognosis is Excellent.     Patient will continue to benefit from skilled outpatient physical therapy to address the deficits listed in the problem list box on initial evaluation, provide pt/family education and to maximize pt's level of independence in the home and community environment.     Patient's spiritual, cultural and educational needs considered and pt agreeable to plan of care and goals.     Anticipated barriers to physical therapy: none    Goals:  Short Term Goals: 4 weeks   1. Patient will be Independent with Home Exercise Program.  Goal Met  2. Patient will demonstrate with improved Posture and Body Mechanics Goal Met  3. Patient will increase Lumbosacral Range of Motion by 25% pain free  4. Patient will increase Lower Extremity and Core Strength to grossly 4+/5  5. Patient will decrease complaints of pain with activity to Less than or Equal to 5/10.  Goal Met      Long Term Goals: 6 weeks   1. Patient will tolerate 30 minutes of activity with complaints of Pain at Less  Than or Equal to 4/10.       Plan   Updated Certification Period: 1/26/2024 to 2/15/2024   Recommended Treatment Plan: 2 times per week for 3 weeks:  Gait Training, Manual Therapy, Moist Heat/ Ice, Neuromuscular Re-ed, Patient Education, Therapeutic Activities, and Therapeutic Exercise    Pt will D/C today.    Karime Corbin, PTA

## 2024-02-13 RX ORDER — GABAPENTIN 300 MG/1
600 CAPSULE ORAL 3 TIMES DAILY
Qty: 270 CAPSULE | Refills: 1 | Status: CANCELLED | OUTPATIENT
Start: 2024-02-13 | End: 2024-05-13

## 2024-02-13 RX ORDER — DULOXETIN HYDROCHLORIDE 30 MG/1
30 CAPSULE, DELAYED RELEASE ORAL DAILY
Qty: 90 CAPSULE | Refills: 1 | Status: CANCELLED | OUTPATIENT
Start: 2024-02-13 | End: 2024-08-11

## 2024-02-13 NOTE — PROGRESS NOTES
Subjective:         Patient ID: Ene Clemons is a 58 y.o. female.    Chief Complaint: Low-back Pain and Hip Pain (Bilateral )      Pain  This is a chronic problem. The current episode started more than 1 year ago. The problem has been waxing and waning. Associated symptoms include arthralgias. Pertinent negatives include no anorexia, change in bowel habit, chest pain, chills, coughing, diaphoresis, fever, neck pain, rash, sore throat, swollen glands, urinary symptoms, vertigo or vomiting.     Review of Systems   Constitutional:  Negative for activity change, appetite change, chills, diaphoresis, fever and unexpected weight change.   HENT:  Negative for drooling, ear discharge, ear pain, facial swelling, nosebleeds, sore throat, trouble swallowing, voice change and goiter.    Eyes:  Negative for photophobia, pain, discharge, redness and visual disturbance.   Respiratory:  Negative for apnea, cough, choking, chest tightness, shortness of breath, wheezing and stridor.    Cardiovascular:  Negative for chest pain, palpitations and leg swelling.   Gastrointestinal:  Negative for abdominal distention, anorexia, change in bowel habit, diarrhea, rectal pain, vomiting and fecal incontinence.   Endocrine: Negative for cold intolerance, heat intolerance, polydipsia, polyphagia and polyuria.   Genitourinary:  Negative for bladder incontinence, dysuria, flank pain, frequency and hot flashes.   Musculoskeletal:  Positive for arthralgias, back pain and leg pain. Negative for neck pain.   Integumentary:  Negative for color change, pallor and rash.   Allergic/Immunologic: Negative for immunocompromised state.   Neurological:  Negative for dizziness, vertigo, seizures, syncope, facial asymmetry, speech difficulty, light-headedness, memory loss and coordination difficulties.   Hematological:  Negative for adenopathy. Does not bruise/bleed easily.   Psychiatric/Behavioral:  Negative for agitation, behavioral problems, confusion,  decreased concentration, dysphoric mood, hallucinations, self-injury and suicidal ideas. The patient is not nervous/anxious and is not hyperactive.            Past Medical History:   Diagnosis Date    Arthritis     Depression     Diabetic eye exam 2023    Dr. Due[facility]as [facility]    Hyperlipidemia     Hypothyroidism     Thyroid disease      Past Surgical History:   Procedure Laterality Date    ADENOIDECTOMY      CHOLECYSTECTOMY      HYSTERECTOMY      TONSILLECTOMY      TOTAL THYROIDECTOMY       Social History     Socioeconomic History    Marital status:      Spouse name: Hector    Number of children: 0   Occupational History    Occupation: retired   Tobacco Use    Smoking status: Former     Current packs/day: 0.00     Average packs/day: 0.5 packs/day for 39.0 years (19.5 ttl pk-yrs)     Types: Cigarettes     Start date:      Quit date:      Years since quittin.1     Passive exposure: Current    Smokeless tobacco: Never   Substance and Sexual Activity    Alcohol use: Not Currently    Drug use: Never    Sexual activity: Yes     Partners: Male     Social Determinants of Health     Financial Resource Strain: Low Risk  (2022)    Overall Financial Resource Strain (CARDIA)     Difficulty of Paying Living Expenses: Not hard at all   Food Insecurity: No Food Insecurity (2022)    Hunger Vital Sign     Worried About Running Out of Food in the Last Year: Never true     Ran Out of Food in the Last Year: Never true   Transportation Needs: No Transportation Needs (2022)    PRAPARE - Transportation     Lack of Transportation (Medical): No     Lack of Transportation (Non-Medical): No   Physical Activity: Inactive (2022)    Exercise Vital Sign     Days of Exercise per Week: 0 days     Minutes of Exercise per Session: 0 min   Stress: No Stress Concern Present (2022)    [employer] - Occupational Stress Questionnaire     Feeling of Stress : Not at  "all   Social Connections: Moderately Isolated (1/28/2022)    Social Connection and Isolation Panel [NHANES]     Frequency of Communication with Friends and Family: More than three times a week     Frequency of Social Gatherings with Friends and Family: More than three times a week     Attends Muslim Services: Never     Active Member of Clubs or Organizations: No     Attends Club or Organization Meetings: Never     Marital Status:    Housing Stability: Low Risk  (1/28/2022)    Housing Stability Vital Sign     Unable to Pay for Housing in the Last Year: No     Number of Places Lived in the Last Year: 0     Unstable Housing in the Last Year: No     Family History   Problem Relation Age of Onset    No Known Problems Mother     Arthritis Father     Heart disease Father     Cancer Father     No Known Problems Sister      Review of patient's allergies indicates:   Allergen Reactions    Doxycycline     Sulfa (sulfonamide antibiotics)         Objective:  Vitals:    02/15/24 1252   BP: (!) 144/60   Pulse: 70   Resp: 18   Weight: 89.4 kg (197 lb)   Height: 5' 8" (1.727 m)   PainSc:   3         Physical Exam  Vitals and nursing note reviewed. Exam conducted with a chaperone present.   Constitutional:       General: She is awake. She is not in acute distress.     Appearance: Normal appearance. She is not ill-appearing, toxic-appearing or diaphoretic.   HENT:      Head: Normocephalic and atraumatic.      Nose: Nose normal.      Mouth/Throat:      Mouth: Mucous membranes are moist.      Pharynx: Oropharynx is clear.   Eyes:      Conjunctiva/sclera: Conjunctivae normal.      Pupils: Pupils are equal, round, and reactive to light.   Cardiovascular:      Rate and Rhythm: Normal rate.   Pulmonary:      Effort: Pulmonary effort is normal. No respiratory distress.   Abdominal:      Palpations: Abdomen is soft.      Tenderness: There is no guarding.   Musculoskeletal:         General: Normal range of motion.      Cervical back: " Normal range of motion and neck supple. No rigidity.   Skin:     General: Skin is warm and dry.      Coloration: Skin is not jaundiced or pale.   Neurological:      General: No focal deficit present.      Mental Status: She is alert and oriented to person, place, and time. Mental status is at baseline.      Cranial Nerves: No cranial nerve deficit (II-XII).   Psychiatric:         Mood and Affect: Mood normal.         Behavior: Behavior normal. Behavior is cooperative.         Thought Content: Thought content normal.           X-Ray Lumbar Complete Including Flex And Ext  Narrative: EXAMINATION:  XR LUMBAR SPINE AP and lateral VIEW WITH FLEX AND EXT    CLINICAL HISTORY:  Radiculopathy, lumbar region    TECHNIQUE:  AP and lateral views of the lumbar spine plus flexion extension views were performed.    COMPARISON:  05/12/2021    FINDINGS:  The vertebral body heights are maintained.  There is mild degree of anterolisthesis a 3-4 mm of L4 on L5.  Remainder of the sagittal alignment is maintained.  No significant change in alignment on dynamic imaging.  Mild to moderate degenerative endplate changes are seen most notably at L3-4 where there is endplate sclerosis, disc height loss, and osteophyte formation.  Moderate facet degeneration L3-4 through L5-S1 also with sclerosis and osteophyte formation.  Impression: Multilevel degenerative changes.    Electronically signed by: Jovanny Ariza  Date:    12/06/2023  Time:    13:05  X-Ray Hip 3 or 4 views Bilateral  Narrative: EXAMINATION:  XR SACROILIAC JOINTS COMPLETE; XR HIP 3 OR 4 VIEWS BILATERAL    CLINICAL HISTORY:  Sacroiliitis, not elsewhere classified    TECHNIQUE:  AP pelvis, AP and frogleg lateral of the right and left hip, AP and oblique views of the right and left sacroiliac joint.    COMPARISON:  None    FINDINGS:  No fracture.  There are mild-to-moderate degenerative changes seen of both sacroiliac joints and of both hips.  No dislocation.  Soft tissues  unremarkable.  Impression: Mild-to-moderate degenerative changes of the sacroiliac joints and of both hips.    Electronically signed by: Jovanny Ariza  Date:    12/06/2023  Time:    13:04  X-Ray Sacroiliac Joints Complete  Narrative: EXAMINATION:  XR SACROILIAC JOINTS COMPLETE; XR HIP 3 OR 4 VIEWS BILATERAL    CLINICAL HISTORY:  Sacroiliitis, not elsewhere classified    TECHNIQUE:  AP pelvis, AP and frogleg lateral of the right and left hip, AP and oblique views of the right and left sacroiliac joint.    COMPARISON:  None    FINDINGS:  No fracture.  There are mild-to-moderate degenerative changes seen of both sacroiliac joints and of both hips.  No dislocation.  Soft tissues unremarkable.  Impression: Mild-to-moderate degenerative changes of the sacroiliac joints and of both hips.    Electronically signed by: Jovanny Ariza  Date:    12/06/2023  Time:    13:04       Office Visit on 12/06/2023   Component Date Value Ref Range Status    POC Amphetamines 12/06/2023 Negative  Negative, Inconclusive Final    POC Barbiturates 12/06/2023 Negative  Negative, Inconclusive Final    POC Benzodiazepines 12/06/2023 Presumptive Positive (A)  Negative, Inconclusive Final    POC Cocaine 12/06/2023 Negative  Negative, Inconclusive Final    POC THC 12/06/2023 Negative  Negative, Inconclusive Final    POC Methadone 12/06/2023 Negative  Negative, Inconclusive Final    POC Methamphetamine 12/06/2023 Negative  Negative, Inconclusive Final    POC Opiates 12/06/2023 Negative  Negative, Inconclusive Final    POC Oxycodone 12/06/2023 Negative  Negative, Inconclusive Final    POC Phencyclidine 12/06/2023 Negative  Negative, Inconclusive Final    POC Methylenedioxymethamphetamine * 12/06/2023 Negative  Negative, Inconclusive Final    POC Tricyclic Antidepressants 12/06/2023 Negative  Negative, Inconclusive Final    POC Buprenorphine 12/06/2023 Negative   Final     Acceptable 12/06/2023 Yes   Final    POC Temperature (Urine) 12/06/2023 94    Final    [facility] Generic Orderable 12/06/2023 SEE COMMENTS   Final   Patient Outreach on 11/06/2023   Component Date Value Ref Range Status    Left Eye DM Retinopathy 02/27/2023 Negative   Final    Right Eye DM Retinopathy 02/27/2023 Negative   Final   Office Visit on 10/20/2023   Component Date Value Ref Range Status    Hemoglobin A1C 10/23/2023 5.5  % Final    Estimated Average Glucose 10/23/2023    Final   Lab Visit on 10/20/2023   Component Date Value Ref Range Status    Triglycerides 10/20/2023 296 (H)  35 - 150 mg/dL Final    Cholesterol 10/20/2023 174  0 - 200 mg/dL Final    HDL Cholesterol 10/20/2023 50  40 - 60 mg/dL Final    Cholesterol/HDL Ratio (Risk Factor) 10/20/2023 3.5   Final    Non-HDL 10/20/2023 124  mg/dL Final    LDL Calculated 10/20/2023 65  mg/dL Final    LDL/HDL 10/20/2023 1.3   Final    VLDL 10/20/2023 59  mg/dL Final    Glucose, Fasting 10/20/2023 151 (H)  74 - 106 mg/dL Final         Orders Placed This Encounter   Procedures    Case Request Operating Room: RADIOFREQUENCY THERMAL COAGULATION SI     Order Specific Question:   Medical Necessity:     Answer:   Medically Non-Urgent [100]     Order Specific Question:   CPT Code:     Answer:   UT ABLATION, RADIOFREQ, SACROILIAC JOINT, W/IMG [14079]     Order Specific Question:   Is an on-site pathologist required for this procedure?     Answer:   N/A       Requested Prescriptions      No prescriptions requested or ordered in this encounter       Assessment:     1. Bilateral sacroiliitis    2. Lumbar radiculopathy         A's of Opioid Risk Assessment  Activity:Patient can perform ADL.   Analgesia:Patients pain is partially controlled by current medication. Patient has tried OTC medications such as Tylenol and Ibuprofen with out relief.   Adverse Effects: Patient denies constipation or sedation.  Aberrant Behavior:  reviewed with no aberrant drug seeking/taking behavior.  Overdose reversal drug naloxone discussed    Drug screen  reviewed      Plan:      Physical therapy notes [facility] reviewed February 2024    Continue physical therapy as prescribed 2 to 3 times week     Consider bilateral SI joint injections after completion of physical therapy, bilateral hip injections for bilateral hip pain and L3-4 epidural steroid injection for lumbar radiculopathy      X-ray lumbar spine [facility] December 6, 2023, no fracture noted multiple level degenerative changes mild anterior listhesis L4/5    X-ray bilateral hips /sacroiliac joint [facility] December 6, 2023 no fracture noted mild-to-moderate degenerative changes sacroiliac joint bilateral hips      February 15, 2024 presents today complaint of bilateral back pain pointing to her sacroiliac area worse with flexion extension rotation ongoing for more than 3 months requesting 3rd sacroiliac procedure     She had sacroiliac injection # 2 September 2023 she states she had 80+% relief after procedure procedure did help improve her level of function    Continue Cymbalta, gabapentin on a daily basis     Continues home exercise program as directed     Continue home exercise program as directed ongoing x1 year, 3-4 days per week 30-45 minutes per session  Stretching Exercises   Stretching exercises keep your muscles flexible. They also stop them from getting tight. Start by doing each of these stretches 2 to 3 times. In order for your body to make changes, you will need to hold these stretches for 20 to 30 seconds. Try to do the stretches 2 to 3 times each day.   Do all exercises slowly.   Do not bounce when doing stretches.    Single knee to chest stretches ? Lie on your back, bend your knees and have your feet flat on the floor. Pull one knee towards your chest until you feel a stretch in your lower back and buttock area. Repeat with the other knee. If you have knee problems, pull your knee up by grabbing the back of your thigh instead of the front of your knee. You can also do this  exercise by grabbing both knees at the same time.    Lower trunk rotations ? While lying on your back, bend your knees and have your feet flat on the floor. Keep your legs together and then drop them to one side. Be sure to keep both of your shoulders touching the floor until you feel a stretch in the muscles at the side of the back. Repeat on the other side.    Lower back stretches seated ? Sit in a chair with your feet spread about shoulder width apart. Then, lean forward until you feel a stretch in your lower back.  Strengthening Exercises   Strengthening exercises keep your muscles firm and strong. Start by repeating each exercise 2 to 3 times.     Work up to doing each exercise 10 times.     Hold each exercise for 3 to 5 seconds. Try to do the exercises 2 to 3 times each day.     Do all exercises slowly.    Shoulder blade squeezes ? Pinch your shoulder blades together on your upper back and hold 3 to 5 seconds. Be sure you are sitting with good posture and make sure your shoulders do not raise up when you do this exercise. Relax.    Pelvic tilts ? Lie on your back with your knees bent and feet flat on the floor. Tighten your stomach muscles and press your lower back down to the floor. Relax.    Hip lifts ? Lie on your back with your knees bent and feet flat on the floor. Tighten your stomach muscles and lift your buttocks off the floor. Relax.    Please see detailed exercises attached to note with diagram    Tender bilateral sacroiliac area  Bilateral sacroiliac compression test positive  Nicole's /Dominic's positive bilateral  Gaenslen positive bilateral  procedure done prior to surgical consideration    Ongoing Physical therapy/home exercise program as directed no longer controlling discomfort    Monitor anesthesia request is medically indicated for the scheduled nerve block procedure due to:  1- needle phobia and anxiety, placing  the patient at risk during the provided service.  2-patient has an ASA class  greater than 3 and requires constant presence of an anesthesiologist during the procedure,   3-patient has severe problems hard to lie still  4-patient suffers from chronic pain and is unable to function due to  diminished ADLs    Patient's pain medication no longer helping control discomfort    Schedule bilateral sacroiliac RF TC, sacroiliitis     Dr. Neff    Bring original prescription medication bottles/container/box with labels to each visit

## 2024-02-15 ENCOUNTER — OFFICE VISIT (OUTPATIENT)
Dept: PAIN MEDICINE | Facility: CLINIC | Age: 59
End: 2024-02-15
Payer: COMMERCIAL

## 2024-02-15 VITALS
RESPIRATION RATE: 18 BRPM | HEART RATE: 70 BPM | WEIGHT: 197 LBS | BODY MASS INDEX: 29.86 KG/M2 | DIASTOLIC BLOOD PRESSURE: 60 MMHG | HEIGHT: 68 IN | SYSTOLIC BLOOD PRESSURE: 144 MMHG

## 2024-02-15 DIAGNOSIS — M54.16 LUMBAR RADICULOPATHY: Chronic | ICD-10-CM

## 2024-02-15 DIAGNOSIS — M46.1 BILATERAL SACROILIITIS: Primary | Chronic | ICD-10-CM

## 2024-02-15 PROCEDURE — 99215 OFFICE O/P EST HI 40 MIN: CPT | Mod: PBBFAC | Performed by: PHYSICIAN ASSISTANT

## 2024-02-15 PROCEDURE — 99213 OFFICE O/P EST LOW 20 MIN: CPT | Mod: S$PBB,,, | Performed by: PHYSICIAN ASSISTANT

## 2024-02-15 NOTE — PATIENT INSTRUCTIONS

## 2024-03-16 ENCOUNTER — PATIENT MESSAGE (OUTPATIENT)
Dept: FAMILY MEDICINE | Facility: CLINIC | Age: 59
End: 2024-03-16
Payer: COMMERCIAL

## 2024-03-18 ENCOUNTER — PATIENT MESSAGE (OUTPATIENT)
Dept: FAMILY MEDICINE | Facility: CLINIC | Age: 59
End: 2024-03-18
Payer: COMMERCIAL

## 2024-03-20 DIAGNOSIS — Z12.31 ENCOUNTER FOR SCREENING MAMMOGRAM FOR MALIGNANT NEOPLASM OF BREAST: Primary | ICD-10-CM

## 2024-03-20 NOTE — TELEPHONE ENCOUNTER
"DAILY PROGRESS NOTE  [facility]    Patient Identification:  Name: Alannah Delgado  Age: 60 y.o.  Sex: female  :  1957  MRN: [id number]         Primary Care Physician: Jc Reardon MD      Subjective  No new c/o.  RLE feeling much better and pt walking now.    Objective:  General Appearance:  Comfortable, well-appearing, in no acute distress and not in pain.    Vital signs: (most recent): Blood pressure 125/82, pulse 76, temperature 97.2 °F (36.2 °C), temperature source Oral, resp. rate 16, height 62\" (157.5 cm), weight 186 lb (84.4 kg), SpO2 100 %.    Lungs:  Normal respiratory rate and normal effort.  Breath sounds clear to auscultation.    Heart: Normal rate.  Regular rhythm.    Extremities: There is no dependent edema.    Neurological: Patient is alert and oriented to person, place and time.    Skin:  Warm and dry.                Vital signs in last 24 hours:  Temp:  [97.2 °F (36.2 °C)-98.7 °F (37.1 °C)] 97.2 °F (36.2 °C)  Heart Rate:  [75-89] 76  Resp:  [16] 16  BP: (115-129)/(78-85) 125/82    Intake/Output:    Intake/Output Summary (Last 24 hours) at 17 1309  Last data filed at 17 0334   Gross per 24 hour   Intake              360 ml   Output              750 ml   Net             -390 ml           Results from last 7 days  Lab Units 17  0307 17  0403 17  0312 17  1938   WBC 10*3/mm3 5.76 6.22 7.84 8.84   HEMOGLOBIN g/dL 11.6* 12.0 12.0 12.6   PLATELETS 10*3/mm3 267 238 240 260     Results from last 7 days  Lab Units 17  0313   SODIUM mmol/L 138   POTASSIUM mmol/L 3.5   CHLORIDE mmol/L 101   CO2 mmol/L 21.4*   BUN mg/dL 10   CREATININE mg/dL 0.59   GLUCOSE mg/dL 102*   Estimated Creatinine Clearance: 100.9 mL/min (by C-G formula based on Cr of 0.59).  Results from last 7 days  Lab Units 17  0313   CALCIUM mg/dL 9.2   ALBUMIN g/dL 3.70     Results from last 7 days  Lab Units 17  0313   ALBUMIN g/dL 3.70   BILIRUBIN mg/dL 0.8   ALK " Sugar and triglycerides were high for fasting. We will recheck at next visit.  PHOS U/L 75   AST (SGOT) U/L 22   ALT (SGPT) U/L 28       Assessment:  Principal Problem:    Acute deep vein thrombosis (DVT) of femoral vein of right lower extremity  Active Problems:    C5 on 6 anterolisthesis    Cervical vertebral fusion    Patient is [employer]        Plan:  Increase Coumadin to 6 mg.  Hopefully therapeutic tomorrow.     Jarocho Bass MD  7/20/2017  1:09 PM

## 2024-03-25 ENCOUNTER — PATIENT MESSAGE (OUTPATIENT)
Dept: PAIN MEDICINE | Facility: CLINIC | Age: 59
End: 2024-03-25
Payer: COMMERCIAL

## 2024-03-26 ENCOUNTER — ANESTHESIA (OUTPATIENT)
Dept: PAIN MEDICINE | Facility: HOSPITAL | Age: 59
End: 2024-03-26
Payer: COMMERCIAL

## 2024-03-26 ENCOUNTER — HOSPITAL ENCOUNTER (OUTPATIENT)
Facility: HOSPITAL | Age: 59
Discharge: HOME | End: 2024-03-26
Attending: PAIN MEDICINE | Admitting: PAIN MEDICINE
Payer: COMMERCIAL

## 2024-03-26 ENCOUNTER — ANESTHESIA EVENT (OUTPATIENT)
Dept: PAIN MEDICINE | Facility: HOSPITAL | Age: 59
End: 2024-03-26
Payer: COMMERCIAL

## 2024-03-26 VITALS
HEIGHT: 68 IN | BODY MASS INDEX: 29.7 KG/M2 | RESPIRATION RATE: 13 BRPM | DIASTOLIC BLOOD PRESSURE: 84 MMHG | TEMPERATURE: 99 F | OXYGEN SATURATION: 97 % | SYSTOLIC BLOOD PRESSURE: 154 MMHG | WEIGHT: 196 LBS | HEART RATE: 61 BPM

## 2024-03-26 DIAGNOSIS — M46.1 SACROILIITIS: ICD-10-CM

## 2024-03-26 PROCEDURE — 27000284 HC CANNULA NASAL: Performed by: NURSE ANESTHETIST, CERTIFIED REGISTERED

## 2024-03-26 PROCEDURE — 25000003 PHARM REV CODE 250: Performed by: PAIN MEDICINE

## 2024-03-26 PROCEDURE — 37000009 HC ANESTHESIA EA ADD 15 MINS: Performed by: PAIN MEDICINE

## 2024-03-26 PROCEDURE — 25000003 PHARM REV CODE 250: Performed by: NURSE ANESTHETIST, CERTIFIED REGISTERED

## 2024-03-26 PROCEDURE — 27201423 OPTIME MED/SURG SUP & DEVICES STERILE SUPPLY: Performed by: PAIN MEDICINE

## 2024-03-26 PROCEDURE — 64625 RF ABLTJ NRV NRVTG SI JT: CPT | Mod: 50 | Performed by: PAIN MEDICINE

## 2024-03-26 PROCEDURE — 64625 RF ABLTJ NRV NRVTG SI JT: CPT | Mod: 50,,, | Performed by: PAIN MEDICINE

## 2024-03-26 PROCEDURE — 63600175 PHARM REV CODE 636 W HCPCS: Performed by: NURSE ANESTHETIST, CERTIFIED REGISTERED

## 2024-03-26 PROCEDURE — 63600175 PHARM REV CODE 636 W HCPCS: Performed by: PAIN MEDICINE

## 2024-03-26 PROCEDURE — 37000008 HC ANESTHESIA 1ST 15 MINUTES: Performed by: PAIN MEDICINE

## 2024-03-26 PROCEDURE — D9220A PRA ANESTHESIA: Mod: ,,, | Performed by: NURSE ANESTHETIST, CERTIFIED REGISTERED

## 2024-03-26 RX ORDER — LIDOCAINE HYDROCHLORIDE 20 MG/ML
INJECTION, SOLUTION EPIDURAL; INFILTRATION; INTRACAUDAL; PERINEURAL
Status: DISCONTINUED | OUTPATIENT
Start: 2024-03-26 | End: 2024-03-26

## 2024-03-26 RX ORDER — SODIUM CHLORIDE 9 MG/ML
INJECTION, SOLUTION INTRAVENOUS CONTINUOUS
Status: DISCONTINUED | OUTPATIENT
Start: 2024-03-26 | End: 2024-03-26 | Stop reason: HOSPADM

## 2024-03-26 RX ORDER — ORPHENADRINE CITRATE 30 MG/ML
INJECTION INTRAMUSCULAR; INTRAVENOUS
Status: DISCONTINUED | OUTPATIENT
Start: 2024-03-26 | End: 2024-03-26

## 2024-03-26 RX ORDER — TRIAMCINOLONE ACETONIDE 40 MG/ML
INJECTION, SUSPENSION INTRA-ARTICULAR; INTRAMUSCULAR CODE/TRAUMA/SEDATION MEDICATION
Status: DISCONTINUED | OUTPATIENT
Start: 2024-03-26 | End: 2024-03-26 | Stop reason: HOSPADM

## 2024-03-26 RX ORDER — PROPOFOL 10 MG/ML
VIAL (ML) INTRAVENOUS
Status: DISCONTINUED | OUTPATIENT
Start: 2024-03-26 | End: 2024-03-26

## 2024-03-26 RX ORDER — BUPIVACAINE HYDROCHLORIDE 2.5 MG/ML
INJECTION, SOLUTION EPIDURAL; INFILTRATION; INTRACAUDAL CODE/TRAUMA/SEDATION MEDICATION
Status: DISCONTINUED | OUTPATIENT
Start: 2024-03-26 | End: 2024-03-26 | Stop reason: HOSPADM

## 2024-03-26 RX ADMIN — PROPOFOL 50 MG: 10 INJECTION, EMULSION INTRAVENOUS at 11:03

## 2024-03-26 RX ADMIN — ORPHENADRINE CITRATE 60 MG: 30 INJECTION INTRAMUSCULAR; INTRAVENOUS at 11:03

## 2024-03-26 RX ADMIN — LIDOCAINE HYDROCHLORIDE 50 MG: 20 INJECTION, SOLUTION INTRAVENOUS at 11:03

## 2024-03-26 RX ADMIN — SODIUM CHLORIDE: 9 INJECTION, SOLUTION INTRAVENOUS at 10:03

## 2024-03-26 RX ADMIN — PROPOFOL 50 MG: 10 INJECTION, EMULSION INTRAVENOUS at 12:03

## 2024-03-26 NOTE — H&P
Subjective:         Patient ID: Ene Clemons is a 58 y.o. female.    Chief Complaint: Low-back Pain and Hip Pain (Bilateral )    Pain  This is a chronic problem. The current episode started more than 1 year ago. The problem has been waxing and waning. Associated symptoms include arthralgias. Pertinent negatives include no anorexia, change in bowel habit, chest pain, chills, coughing, diaphoresis, fever, neck pain, rash, sore throat, swollen glands, urinary symptoms, vertigo or vomiting.     Review of Systems   Constitutional:  Negative for activity change, appetite change, chills, diaphoresis, fever and unexpected weight change.   HENT:  Negative for drooling, ear discharge, ear pain, facial swelling, nosebleeds, sore throat, trouble swallowing, voice change and goiter.    Eyes:  Negative for photophobia, pain, discharge, redness and visual disturbance.   Respiratory:  Negative for apnea, cough, choking, chest tightness, shortness of breath, wheezing and stridor.    Cardiovascular:  Negative for chest pain, palpitations and leg swelling.   Gastrointestinal:  Negative for abdominal distention, anorexia, change in bowel habit, diarrhea, rectal pain, vomiting and fecal incontinence.   Endocrine: Negative for cold intolerance, heat intolerance, polydipsia, polyphagia and polyuria.   Genitourinary:  Negative for bladder incontinence, dysuria, flank pain, frequency and hot flashes.   Musculoskeletal:  Positive for arthralgias, back pain and leg pain. Negative for neck pain.   Integumentary:  Negative for color change, pallor and rash.   Allergic/Immunologic: Negative for immunocompromised state.   Neurological:  Negative for dizziness, vertigo, seizures, syncope, facial asymmetry, speech difficulty, light-headedness, memory loss and coordination difficulties.   Hematological:  Negative for adenopathy. Does not bruise/bleed easily.   Psychiatric/Behavioral:  Negative for agitation, behavioral problems, confusion,  decreased concentration, dysphoric mood, hallucinations, self-injury and suicidal ideas. The patient is not nervous/anxious and is not hyperactive.            Past Medical History:   Diagnosis Date    Arthritis     Depression     Diabetic eye exam 2023    [facility] [place]    Hyperlipidemia     Hypothyroidism     Thyroid disease      Past Surgical History:   Procedure Laterality Date    ADENOIDECTOMY      CHOLECYSTECTOMY      HYSTERECTOMY      TONSILLECTOMY      TOTAL THYROIDECTOMY       Social History     Socioeconomic History    Marital status:      Spouse name: Hector    Number of children: 0   Occupational History    Occupation: retired   Tobacco Use    Smoking status: Former     Current packs/day: 0.00     Average packs/day: 0.5 packs/day for 39.0 years (19.5 ttl pk-yrs)     Types: Cigarettes     Start date:      Quit date:      Years since quittin.1     Passive exposure: Current    Smokeless tobacco: Never   Substance and Sexual Activity    Alcohol use: Not Currently    Drug use: Never    Sexual activity: Yes     Partners: Male     Social Determinants of Health     Financial Resource Strain: Low Risk  (2022)    Overall Financial Resource Strain (CARDIA)     Difficulty of Paying Living Expenses: Not hard at all   Food Insecurity: No Food Insecurity (2022)    Hunger Vital Sign     Worried About Running Out of Food in the Last Year: Never true     Ran Out of Food in the Last Year: Never true   Transportation Needs: No Transportation Needs (2022)    PRAPARE - Transportation     Lack of Transportation (Medical): No     Lack of Transportation (Non-Medical): No   Physical Activity: Inactive (2022)    Exercise Vital Sign     Days of Exercise per Week: 0 days     Minutes of Exercise per Session: 0 min   Stress: No Stress Concern Present (2022)    [employer] - Occupational Stress Questionnaire     Feeling of Stress : Not at  "all   Social Connections: Moderately Isolated (1/28/2022)    Social Connection and Isolation Panel [NHANES]     Frequency of Communication with Friends and Family: More than three times a week     Frequency of Social Gatherings with Friends and Family: More than three times a week     Attends [employer] Services: Never     Active Member of Clubs or Organizations: No     Attends Club or Organization Meetings: Never     Marital Status:    Housing Stability: Low Risk  (1/28/2022)    Housing Stability Vital Sign     Unable to Pay for Housing in the Last Year: No     Number of Places Lived in the Last Year: 0     Unstable Housing in the Last Year: No     Family History   Problem Relation Age of Onset    No Known Problems Mother     Arthritis Father     Heart disease Father     Cancer Father     No Known Problems Sister      Review of patient's allergies indicates:   Allergen Reactions    Doxycycline     Sulfa (sulfonamide antibiotics)         Objective:  Vitals:    02/15/24 1252   BP: (!) 144/60   Pulse: 70   Resp: 18   Weight: 89.4 kg (197 lb)   Height: 5' 8" (1.727 m)   PainSc:   3         Physical Exam  Vitals and nursing note reviewed. Exam conducted with a chaperone present.   Constitutional:       General: She is awake. She is not in acute distress.     Appearance: Normal appearance. She is not ill-appearing, toxic-appearing or diaphoretic.   HENT:      Head: Normocephalic and atraumatic.      Nose: Nose normal.      Mouth/Throat:      Mouth: Mucous membranes are moist.      Pharynx: Oropharynx is clear.   Eyes:      Conjunctiva/sclera: Conjunctivae normal.      Pupils: Pupils are equal, round, and reactive to light.   Cardiovascular:      Rate and Rhythm: Normal rate.   Pulmonary:      Effort: Pulmonary effort is normal. No respiratory distress.   Abdominal:      Palpations: Abdomen is soft.      Tenderness: There is no guarding.   Musculoskeletal:         General: Normal range of motion.      Cervical back: " Normal range of motion and neck supple. No rigidity.   Skin:     General: Skin is warm and dry.      Coloration: Skin is not jaundiced or pale.   Neurological:      General: No focal deficit present.      Mental Status: She is alert and oriented to person, place, and time. Mental status is at baseline.      Cranial Nerves: No cranial nerve deficit (II-XII).   Psychiatric:         Mood and Affect: Mood normal.         Behavior: Behavior normal. Behavior is cooperative.         Thought Content: Thought content normal.           X-Ray Lumbar Complete Including Flex And Ext  Narrative: EXAMINATION:  XR LUMBAR SPINE AP and lateral VIEW WITH FLEX AND EXT    CLINICAL HISTORY:  Radiculopathy, lumbar region    TECHNIQUE:  AP and lateral views of the lumbar spine plus flexion extension views were performed.    COMPARISON:  05/12/2021    FINDINGS:  The vertebral body heights are maintained.  There is mild degree of anterolisthesis a 3-4 mm of L4 on L5.  Remainder of the sagittal alignment is maintained.  No significant change in alignment on dynamic imaging.  Mild to moderate degenerative endplate changes are seen most notably at L3-4 where there is endplate sclerosis, disc height loss, and osteophyte formation.  Moderate facet degeneration L3-4 through L5-S1 also with sclerosis and osteophyte formation.  Impression: Multilevel degenerative changes.    Electronically signed by: Jovanny Ariza  Date:    12/06/2023  Time:    13:05  X-Ray Hip 3 or 4 views Bilateral  Narrative: EXAMINATION:  XR SACROILIAC JOINTS COMPLETE; XR HIP 3 OR 4 VIEWS BILATERAL    CLINICAL HISTORY:  Sacroiliitis, not elsewhere classified    TECHNIQUE:  AP pelvis, AP and frogleg lateral of the right and left hip, AP and oblique views of the right and left sacroiliac joint.    COMPARISON:  None    FINDINGS:  No fracture.  There are mild-to-moderate degenerative changes seen of both sacroiliac joints and of both hips.  No dislocation.  Soft tissues  unremarkable.  Impression: Mild-to-moderate degenerative changes of the sacroiliac joints and of both hips.    Electronically signed by: Jovanny Ariza  Date:    12/06/2023  Time:    13:04  X-Ray Sacroiliac Joints Complete  Narrative: EXAMINATION:  XR SACROILIAC JOINTS COMPLETE; XR HIP 3 OR 4 VIEWS BILATERAL    CLINICAL HISTORY:  Sacroiliitis, not elsewhere classified    TECHNIQUE:  AP pelvis, AP and frogleg lateral of the right and left hip, AP and oblique views of the right and left sacroiliac joint.    COMPARISON:  None    FINDINGS:  No fracture.  There are mild-to-moderate degenerative changes seen of both sacroiliac joints and of both hips.  No dislocation.  Soft tissues unremarkable.  Impression: Mild-to-moderate degenerative changes of the sacroiliac joints and of both hips.    Electronically signed by: Jovanny Ariza  Date:    12/06/2023  Time:    13:04       Office Visit on 12/06/2023   Component Date Value Ref Range Status    POC Amphetamines 12/06/2023 Negative  Negative, Inconclusive Final    POC Barbiturates 12/06/2023 Negative  Negative, Inconclusive Final    POC Benzodiazepines 12/06/2023 Presumptive Positive (A)  Negative, Inconclusive Final    POC Cocaine 12/06/2023 Negative  Negative, Inconclusive Final    POC THC 12/06/2023 Negative  Negative, Inconclusive Final    POC Methadone 12/06/2023 Negative  Negative, Inconclusive Final    POC Methamphetamine 12/06/2023 Negative  Negative, Inconclusive Final    POC Opiates 12/06/2023 Negative  Negative, Inconclusive Final    POC Oxycodone 12/06/2023 Negative  Negative, Inconclusive Final    POC Phencyclidine 12/06/2023 Negative  Negative, Inconclusive Final    POC Methylenedioxymethamphetamine * 12/06/2023 Negative  Negative, Inconclusive Final    POC Tricyclic Antidepressants 12/06/2023 Negative  Negative, Inconclusive Final    POC Buprenorphine 12/06/2023 Negative   Final     Acceptable 12/06/2023 Yes   Final    POC Temperature (Urine) 12/06/2023 94    Final    [place] Generic Orderable 12/06/2023 SEE COMMENTS   Final   Patient Outreach on 11/06/2023   Component Date Value Ref Range Status    Left Eye DM Retinopathy 02/27/2023 Negative   Final    Right Eye DM Retinopathy 02/27/2023 Negative   Final   Office Visit on 10/20/2023   Component Date Value Ref Range Status    Hemoglobin A1C 10/23/2023 5.5  % Final    Estimated Average Glucose 10/23/2023    Final   Lab Visit on 10/20/2023   Component Date Value Ref Range Status    Triglycerides 10/20/2023 296 (H)  35 - 150 mg/dL Final    Cholesterol 10/20/2023 174  0 - 200 mg/dL Final    HDL Cholesterol 10/20/2023 50  40 - 60 mg/dL Final    Cholesterol/HDL Ratio (Risk Factor) 10/20/2023 3.5   Final    Non-HDL 10/20/2023 124  mg/dL Final    LDL Calculated 10/20/2023 65  mg/dL Final    LDL/HDL 10/20/2023 1.3   Final    VLDL 10/20/2023 59  mg/dL Final    Glucose, Fasting 10/20/2023 151 (H)  74 - 106 mg/dL Final         Orders Placed This Encounter   Procedures    Case Request Operating Room: RADIOFREQUENCY THERMAL COAGULATION SI     Order Specific Question:   Medical Necessity:     Answer:   Medically Non-Urgent [100]     Order Specific Question:   CPT Code:     Answer:   AR ABLATION, RADIOFREQ, SACROILIAC JOINT, W/IMG [31137]     Order Specific Question:   Is an on-site pathologist required for this procedure?     Answer:   N/A       Requested Prescriptions      No prescriptions requested or ordered in this encounter       Assessment:     1. Bilateral sacroiliitis    2. Lumbar radiculopathy         A's of Opioid Risk Assessment  Activity:Patient can perform ADL.   Analgesia:Patients pain is partially controlled by current medication. Patient has tried OTC medications such as Tylenol and Ibuprofen with out relief.   Adverse Effects: Patient denies constipation or sedation.  Aberrant Behavior:  reviewed with no aberrant drug seeking/taking behavior.  Overdose reversal drug naloxone discussed    Drug screen  reviewed      Plan:      Physical therapy notes [facility] reviewed February 2024    Continue physical therapy as prescribed 2 to 3 times week     Consider bilateral SI joint injections after completion of physical therapy, bilateral hip injections for bilateral hip pain and L3-4 epidural steroid injection for lumbar radiculopathy      X-ray lumbar spine [facility] December 6, 2023, no fracture noted multiple level degenerative changes mild anterior listhesis L4/5    X-ray bilateral hips /sacroiliac joint [facility] December 6, 2023 no fracture noted mild-to-moderate degenerative changes sacroiliac joint bilateral hips      February 15, 2024 presents today complaint of bilateral back pain pointing to her sacroiliac area worse with flexion extension rotation ongoing for more than 3 months requesting 3rd sacroiliac procedure     She had sacroiliac injection # 2 September 2023 she states she had 80+% relief after procedure procedure did help improve her level of function    Continue Cymbalta, gabapentin on a daily basis     Continues home exercise program as directed     Continue home exercise program as directed ongoing x1 year, 3-4 days per week 30-45 minutes per session  Stretching Exercises   Stretching exercises keep your muscles flexible. They also stop them from getting tight. Start by doing each of these stretches 2 to 3 times. In order for your body to make changes, you will need to hold these stretches for 20 to 30 seconds. Try to do the stretches 2 to 3 times each day.   Do all exercises slowly.   Do not bounce when doing stretches.    Single knee to chest stretches ? Lie on your back, bend your knees and have your feet flat on the floor. Pull one knee towards your chest until you feel a stretch in your lower back and buttock area. Repeat with the other knee. If you have knee problems, pull your knee up by grabbing the back of your thigh instead of the front of your knee. You can also do this  exercise by grabbing both knees at the same time.    Lower trunk rotations ? While lying on your back, bend your knees and have your feet flat on the floor. Keep your legs together and then drop them to one side. Be sure to keep both of your shoulders touching the floor until you feel a stretch in the muscles at the side of the back. Repeat on the other side.    Lower back stretches seated ? Sit in a chair with your feet spread about shoulder width apart. Then, lean forward until you feel a stretch in your lower back.  Strengthening Exercises   Strengthening exercises keep your muscles firm and strong. Start by repeating each exercise 2 to 3 times.     Work up to doing each exercise 10 times.     Hold each exercise for 3 to 5 seconds. Try to do the exercises 2 to 3 times each day.     Do all exercises slowly.    Shoulder blade squeezes ? Pinch your shoulder blades together on your upper back and hold 3 to 5 seconds. Be sure you are sitting with good posture and make sure your shoulders do not raise up when you do this exercise. Relax.    Pelvic tilts ? Lie on your back with your knees bent and feet flat on the floor. Tighten your stomach muscles and press your lower back down to the floor. Relax.    Hip lifts ? Lie on your back with your knees bent and feet flat on the floor. Tighten your stomach muscles and lift your buttocks off the floor. Relax.    Please see detailed exercises attached to note with diagram    Tender bilateral sacroiliac area  Bilateral sacroiliac compression test positive  Nicole's /Dominic's positive bilateral  Gaenslen positive bilateral  procedure done prior to surgical consideration    Ongoing Physical therapy/home exercise program as directed no longer controlling discomfort    Monitor anesthesia request is medically indicated for the scheduled nerve block procedure due to:  1- needle phobia and anxiety, placing  the patient at risk during the provided service.  2-patient has an ASA class  greater than 3 and requires constant presence of an anesthesiologist during the procedure,   3-patient has severe problems hard to lie still  4-patient suffers from chronic pain and is unable to function due to  diminished ADLs    Patient's pain medication no longer helping control discomfort    Schedule bilateral sacroiliac RF TC, sacroiliitis     Dr. Neff    Bring original prescription medication bottles/container/box with labels to each visit             Review of Systems   Constitutional:  Negative for activity change, appetite change, chills, diaphoresis, fever and unexpected weight change.   HENT:  Negative for drooling, ear discharge, ear pain, facial swelling, nosebleeds, sore throat, trouble swallowing and voice change.    Eyes:  Negative for photophobia, pain, discharge, redness and visual disturbance.   Respiratory:  Negative for apnea, cough, choking, chest tightness, shortness of breath, wheezing and stridor.    Cardiovascular:  Negative for chest pain, palpitations and leg swelling.   Gastrointestinal:  Negative for abdominal distention, anorexia, change in bowel habit, diarrhea, rectal pain and vomiting.   Genitourinary:  Negative for bladder incontinence, dysuria, flank pain and frequency.   Musculoskeletal:  Positive for arthralgias and back pain. Negative for neck pain.   Skin:  Negative for color change, pallor and rash.   Neurological:  Negative for dizziness, vertigo, seizures, syncope, facial asymmetry, speech difficulty, light-headedness and disturbances in coordination.   Endo/Heme/Allergies:  Negative for cold intolerance, heat intolerance, polydipsia, polyphagia and adenopathy. Does not bruise/bleed easily.   Psychiatric/Behavioral:  Negative for agitation, behavioral problems, confusion, decreased concentration, dysphoric mood, hallucinations, self-injury and suicidal ideas. The patient is not nervous/anxious and is not hyperactive.

## 2024-03-26 NOTE — OP NOTE
3/26/2024    PROCEDURE:  1.  Radiofrequency ablation of the bilateral  L5 dorsal ramus/medial branch  2.  Sacroiliac joint ablation, lateral branches of the bilateral  S1, S2, S3     DIAGNOSIS: Bilateral  sacroiliitis.      Post op diagnosis: same     PHYSICIAN: Radhika Neff MD     MEDICATIONS INJECTED:  1.  2% lidocaine 1 mL to each site to anesthetize prior to ablation  2.  From a mixture of  Kenalog 40mg and  10 cc Bupivacaine 0.25% was injected into each introducer after ablation for prevention of neuritis.       IV SEDATION MEDICATIONS: Per Anesthesia     ESTIMATED BLOOD LOSS: none     COMPLICATIONS: none     The patient arrived in the holding area where informed consent, stable vital signs and an IV access were obtained.  There were no EKG, chest x-ray or laboratory contraindications to the above procedure.  All risks and benefits to the procedure had been explained and discussed with the patient.  The patient agreed and consented to the above procedure.  The patient was brought into the fluoroscopy suite and carefully assisted into the prone position on the fluoroscopy table and allowed to adjust to a position of comfort.  A grounding pad was placed on the back thigh.  The low back and buttocks were widely prepped with ChloraPrep solution, allowed to air dry and draped in standard sterile surgical fashion. After determining the needle insertion sites using fluoroscopy, local anesthesia was provided by making a small wheal in the skin with a 25 gauge 1.5 in needle.     On both side, a 17 gauge 100mm radiofrequency introducer needle was  guided with intermittent fluoroscopy with a perpendicular approach to place at the sacral ala.  Needle tip position was confirmed in AP and oblique views. The same size needles were placed  to the lateral side of the S1, S2 and S3 foramen at approximately 7-10 mm lateral to the foramen. The 1st positioning of these needles was to the 2:00 position of each foramen. The stylette  was withdrawn from the introducer at L5, S1, S2 and S3 and radiofrequency probes with 4 mm active tip was  fully inserted into the introducer.  A lateral view was obtained for standard reference.  At each site, the lateral branch (or medial branch in the case of L5) was stimulated at 2 Hz to a maximum of 2.5 volts to ensure safe needle and electrode placement.     Each target was anesthetized with about 1 mL of 2% lidocaine for lesioning and  after a 60 second delay, lesioning at 80 degree C for 2 min and 30 sec was performed at each level.  Tissue impedances were noted to be between 250-500 Ohms.  After lesioning, 1 mL of solution described above was injected into each introducer to prevent neuritis.  The needles were removed and bandages placed over the needle placement sites.  The patient was returned to the supine position on stretcher and transported to the recovery room without hemodynamic, neurologic or allergic reactions.     The patient was monitored after the procedure.  The patient was given post procedure and discharge instructions to follow at home. The patient was discharged in a stable condition with an adult .

## 2024-03-26 NOTE — ANESTHESIA PREPROCEDURE EVALUATION
03/26/2024  Ene Clemons is a 58 y.o., female.      Pre-op Assessment    I have reviewed the Patient Summary Reports.     I have reviewed the Nursing Notes. I have reviewed the NPO Status.   I have reviewed the Medications.     Review of Systems  Anesthesia Hx:  No problems with previous Anesthesia                    Physical Exam  General: Well nourished, Cooperative, Alert and Oriented    Airway:  Mallampati: II   Mouth Opening: Normal  TM Distance: Normal  Tongue: Normal  Neck ROM: Normal ROM    Dental:  Intact        Anesthesia Plan  Type of Anesthesia, risks & benefits discussed:    Anesthesia Type: Gen Natural Airway, MAC  Intra-op Monitoring Plan: Standard ASA Monitors  Post Op Pain Control Plan: multimodal analgesia  Induction:  IV  Informed Consent: Informed consent signed with the Patient and all parties understand the risks and agree with anesthesia plan.  All questions answered. Patient consented to blood products? Yes  ASA Score: 3  Day of Surgery Review of History & Physical: I have interviewed and examined the patient. I have reviewed the patient's H&P dated: There are no significant changes.     Ready For Surgery From Anesthesia Perspective.     .  Past Medical History:   Diagnosis Date    Arthritis     Depression     Diabetic eye exam 02/27/2023    Dr. Dueñas [facility]    Hyperlipidemia     Hypothyroidism     Thyroid disease        Past Surgical History:   Procedure Laterality Date    ADENOIDECTOMY      CHOLECYSTECTOMY      HYSTERECTOMY      TONSILLECTOMY      TOTAL THYROIDECTOMY         Family History   Problem Relation Age of Onset    No Known Problems Mother     Arthritis Father     Heart disease Father     Cancer Father     No Known Problems Sister        Social History     Socioeconomic History    Marital status:      Spouse name: Hector    Number of children: 0    Occupational History    Occupation: retired   Tobacco Use    Smoking status: Former     Current packs/day: 0.00     Average packs/day: 0.5 packs/day for 39.0 years (19.5 ttl pk-yrs)     Types: Cigarettes     Start date:      Quit date:      Years since quittin.2     Passive exposure: Current    Smokeless tobacco: Never   Substance and Sexual Activity    Alcohol use: Not Currently    Drug use: Never    Sexual activity: Yes     Partners: Male     Social Determinants of Health     Financial Resource Strain: Low Risk  (2022)    Overall Financial Resource Strain (CARDIA)     Difficulty of Paying Living Expenses: Not hard at all   Food Insecurity: No Food Insecurity (2022)    Hunger Vital Sign     Worried About Running Out of Food in the Last Year: Never true     Ran Out of Food in the Last Year: Never true   Transportation Needs: No Transportation Needs (2022)    PRAPARE - Transportation     Lack of Transportation (Medical): No     Lack of Transportation (Non-Medical): No   Physical Activity: Inactive (2022)    Exercise Vital Sign     Days of Exercise per Week: 0 days     Minutes of Exercise per Session: 0 min   Stress: No Stress Concern Present (2022)    [employer] [place] [employer] - Occupational Stress Questionnaire     Feeling of Stress : Not at all   Social Connections: Moderately Isolated (2022)    Social Connection and Isolation Panel [NHANES]     Frequency of Communication with Friends and Family: More than three times a week     Frequency of Social Gatherings with Friends and Family: More than three times a week     Attends [employer] Services: Never     Active Member of Clubs or Organizations: No     Attends Club or Organization Meetings: Never     Marital Status:    Housing Stability: Low Risk  (2022)    Housing Stability Vital Sign     Unable to Pay for Housing in the Last Year: No     Number of Places Lived in the Last Year: 0     Unstable  Housing in the Last Year: No       No current facility-administered medications for this encounter.       Review of patient's allergies indicates:   Allergen Reactions    Doxycycline     Sulfa (sulfonamide antibiotics)       Patient Active Problem List   Diagnosis    Hyperlipidemia    Type 2 diabetes mellitus without complication, with long-term current use of insulin    Depression    Acquired hypothyroidism    History of endometrial cancer    History of total abdominal hysterectomy    Decreased libido    Lumbar radiculopathy

## 2024-03-26 NOTE — PLAN OF CARE
Plan:  D/c pt via wheelchair at 1250  Informed pt if does not void in 8 hours to go to ER. Notify if redness, drainage, from injection site or fever over next 3-4 days. Rest and drink plenty of fluids for the remainder of the day. No lifting over 5 lbs. For the remainder of the day. Continue regular medications as prescribed. May take pain medications as prescribed.     Pain improved 100%  Pre-procedure pain: 7  Post procedure pain: 0

## 2024-03-26 NOTE — TRANSFER OF CARE
"Anesthesia Transfer of Care Note    Patient: Ene Clemons    Procedure(s) Performed: Procedure(s) (LRB):  RADIOFREQUENCY THERMAL COAGULATION SI (Bilateral)    Patient location: Other:    Anesthesia Type: general    Transport from OR: Transported from OR on room air with adequate spontaneous ventilation    Post pain: adequate analgesia    Post assessment: no apparent anesthetic complications    Post vital signs: stable    Level of consciousness: responds to stimulation    Nausea/Vomiting: no nausea/vomiting    Complications: none    Transfer of care protocol was followed      Last vitals: Visit Vitals  BP (!) 140/78   Pulse 69   Temp 37 °C (98.6 °F) (Oral)   Resp 15   Ht 5' 8" (1.727 m)   Wt 88.9 kg (196 lb)   SpO2 (!) 91%   BMI 29.80 kg/m²     "

## 2024-03-26 NOTE — DISCHARGE SUMMARY
[facility] - Pain Management  Discharge Note  Short Stay    Procedure(s) (LRB):  RADIOFREQUENCY THERMAL COAGULATION SI (Bilateral)      OUTCOME: Patient tolerated treatment/procedure well without complication and is now ready for discharge.    DISPOSITION: Home or Self Care    FINAL DIAGNOSIS:  Bilateral sacroiliitis    FOLLOWUP: In clinic    DISCHARGE INSTRUCTIONS:  See nurse's notes     TIME SPENT ON DISCHARGE: 5 minutes

## 2024-03-26 NOTE — BRIEF OP NOTE
The  Discharge Note  Short Stay    Admit Date: 3/26/2024    Discharge Date: 3/26/2024    Attending Physician: Radhika Neff     Discharge Provider: Radhika Neff    Diagnosis: Bilateral sacroiliitis    Procedure performed: Bilateral SI joint RFTC under fluoroscopy    Findings: Procedure tolerated well and without complications. Consistent with diagnosis.    EBL: 0cc    Specimens: None    Discharged Condition: Good    Final Diagnoses: Bilateral sacroiliitis [M46.1]    Disposition: Home or Self Care    Hospital Course: No complications, uneventful    Outcome of Hospitalization, Treatment, Procedure, or Surgery:  Patient was admitted for outpatient interventional pain management procedure. The patient tolerated the procedure well with no complications.    Follow up/Patient Instructions:  Follow up as scheduled in Pain Management office in 3-4 weeks.  Patient has received instructions and follow up date and time.    Medications:  Continue previous medications

## 2024-04-04 NOTE — PROGRESS NOTES
Subjective:         Patient ID: Ene Clemons is a 58 y.o. female.    Chief Complaint: Low-back Pain and Tailbone Pain      Pain  This is a chronic problem. The current episode started more than 1 year ago. The problem has been gradually improving. Associated symptoms include arthralgias. Pertinent negatives include no anorexia, change in bowel habit, chest pain, chills, coughing, diaphoresis, fever, neck pain, rash, sore throat, swollen glands, urinary symptoms, vertigo or vomiting.     Review of Systems   Constitutional:  Negative for activity change, appetite change, chills, diaphoresis, fever and unexpected weight change.   HENT:  Negative for drooling, ear discharge, ear pain, facial swelling, nosebleeds, sore throat, trouble swallowing, voice change and goiter.    Eyes:  Negative for photophobia, pain, discharge, redness and visual disturbance.   Respiratory:  Negative for apnea, cough, choking, chest tightness, shortness of breath, wheezing and stridor.    Cardiovascular:  Negative for chest pain, palpitations and leg swelling.   Gastrointestinal:  Negative for abdominal distention, anorexia, change in bowel habit, diarrhea, rectal pain, vomiting and fecal incontinence.   Endocrine: Negative for cold intolerance, heat intolerance, polydipsia, polyphagia and polyuria.   Genitourinary:  Negative for bladder incontinence, dysuria, flank pain, frequency and hot flashes.   Musculoskeletal:  Positive for arthralgias, back pain and leg pain. Negative for neck pain.   Integumentary:  Negative for color change, pallor and rash.   Allergic/Immunologic: Negative for immunocompromised state.   Neurological:  Negative for dizziness, vertigo, seizures, syncope, facial asymmetry, speech difficulty, light-headedness, memory loss and coordination difficulties.   Hematological:  Negative for adenopathy. Does not bruise/bleed easily.   Psychiatric/Behavioral:  Negative for agitation, behavioral problems, confusion, decreased  concentration, dysphoric mood, hallucinations, self-injury and suicidal ideas. The patient is not nervous/anxious and is not hyperactive.            Past Medical History:   Diagnosis Date    Arthritis     Depression     Diabetic eye exam 2023    Dr. Dueñas [facility]    Hyperlipidemia     Hypothyroidism     Thyroid disease      Past Surgical History:   Procedure Laterality Date    ADENOIDECTOMY      CHOLECYSTECTOMY      HYSTERECTOMY      RADIOFREQUENCY THERMOCOAGULATION Bilateral 3/26/2024    Procedure: RADIOFREQUENCY THERMAL COAGULATION SI;  Surgeon: Radhika Neff MD;  Location: [facility];  Service: Pain Management;  Laterality: Bilateral;    TONSILLECTOMY      TOTAL THYROIDECTOMY       Social History     Socioeconomic History    Marital status:      Spouse name: Hector    Number of children: 0   Occupational History    Occupation: retired   Tobacco Use    Smoking status: Former     Current packs/day: 0.00     Average packs/day: 0.5 packs/day for 39.0 years (19.5 ttl pk-yrs)     Types: Cigarettes     Start date:      Quit date:      Years since quittin.2     Passive exposure: Current    Smokeless tobacco: Never   Substance and Sexual Activity    Alcohol use: Not Currently    Drug use: Never    Sexual activity: Yes     Partners: Male     Social Determinants of Health     Financial Resource Strain: Low Risk  (2022)    Overall Financial Resource Strain (CARDIA)     Difficulty of Paying Living Expenses: Not hard at all   Food Insecurity: No Food Insecurity (2022)    Hunger Vital Sign     Worried About Running Out of Food in the Last Year: Never true     Ran Out of Food in the Last Year: Never true   Transportation Needs: No Transportation Needs (2022)    PRAPARE - Transportation     Lack of Transportation (Medical): No     Lack of Transportation (Non-Medical): No   Physical Activity: Inactive (2022)    Exercise Vital Sign     Days of Exercise per  "Week: 0 days     Minutes of Exercise per Session: 0 min   Stress: No Stress Concern Present (1/28/2022)    [employer] [place] [employer] - Occupational Stress Questionnaire     Feeling of Stress : Not at all   Social Connections: Moderately Isolated (1/28/2022)    Social Connection and Isolation Panel [NHANES]     Frequency of Communication with Friends and Family: More than three times a week     Frequency of Social Gatherings with Friends and Family: More than three times a week     Attends [employer] Services: Never     Active Member of Clubs or Organizations: No     Attends Club or Organization Meetings: Never     Marital Status:    Housing Stability: Low Risk  (1/28/2022)    Housing Stability Vital Sign     Unable to Pay for Housing in the Last Year: No     Number of Places Lived in the Last Year: 0     Unstable Housing in the Last Year: No     Family History   Problem Relation Age of Onset    No Known Problems Mother     Arthritis Father     Heart disease Father     Cancer Father     No Known Problems Sister      Review of patient's allergies indicates:   Allergen Reactions    Doxycycline     Sulfa (sulfonamide antibiotics)         Objective:  Vitals:    04/10/24 0855   BP: 132/73   Pulse: 76   Resp: 18   Weight: 86.2 kg (190 lb)   Height: 5' 8" (1.727 m)   PainSc:   5         Physical Exam  Vitals and nursing note reviewed. Exam conducted with a chaperone present.   Constitutional:       General: She is awake. She is not in acute distress.     Appearance: Normal appearance. She is not ill-appearing, toxic-appearing or diaphoretic.   HENT:      Head: Normocephalic and atraumatic.      Nose: Nose normal.      Mouth/Throat:      Mouth: Mucous membranes are moist.      Pharynx: Oropharynx is clear.   Eyes:      Conjunctiva/sclera: Conjunctivae normal.      Pupils: Pupils are equal, round, and reactive to light.   Cardiovascular:      Rate and Rhythm: Normal rate.   Pulmonary:      Effort: " Pulmonary effort is normal. No respiratory distress.   Abdominal:      Palpations: Abdomen is soft.      Tenderness: There is no guarding.   Musculoskeletal:         General: Normal range of motion.      Cervical back: Normal range of motion and neck supple. No rigidity.   Skin:     General: Skin is warm and dry.      Coloration: Skin is not jaundiced or pale.   Neurological:      General: No focal deficit present.      Mental Status: She is alert and oriented to person, place, and time. Mental status is at baseline.      Cranial Nerves: No cranial nerve deficit (II-XII).   Psychiatric:         Mood and Affect: Mood normal.         Behavior: Behavior normal. Behavior is cooperative.         Thought Content: Thought content normal.           [place] [facility] for Pain Management  See OP Notes for results.     IMPRESSION: See OP Notes for results.     This procedure was auto-finalized by: Virtual Radiologist       Office Visit on 12/06/2023   Component Date Value Ref Range Status    POC Amphetamines 12/06/2023 Negative  Negative, Inconclusive Final    POC Barbiturates 12/06/2023 Negative  Negative, Inconclusive Final    POC Benzodiazepines 12/06/2023 Presumptive Positive (A)  Negative, Inconclusive Final    POC Cocaine 12/06/2023 Negative  Negative, Inconclusive Final    POC THC 12/06/2023 Negative  Negative, Inconclusive Final    POC Methadone 12/06/2023 Negative  Negative, Inconclusive Final    POC Methamphetamine 12/06/2023 Negative  Negative, Inconclusive Final    POC Opiates 12/06/2023 Negative  Negative, Inconclusive Final    POC Oxycodone 12/06/2023 Negative  Negative, Inconclusive Final    POC Phencyclidine 12/06/2023 Negative  Negative, Inconclusive Final    POC Methylenedioxymethamphetamine * 12/06/2023 Negative  Negative, Inconclusive Final    POC Tricyclic Antidepressants 12/06/2023 Negative  Negative, Inconclusive Final    POC Buprenorphine 12/06/2023 Negative   Final     Acceptable 12/06/2023 Yes    Final    POC Temperature (Urine) 12/06/2023 94   Final    Beauty Generic Orderable 12/06/2023 SEE COMMENTS   Final   Patient Outreach on 11/06/2023   Component Date Value Ref Range Status    Left Eye DM Retinopathy 02/27/2023 Negative   Final    Right Eye DM Retinopathy 02/27/2023 Negative   Final   Office Visit on 10/20/2023   Component Date Value Ref Range Status    Hemoglobin A1C 10/23/2023 5.5  % Final    Estimated Average Glucose 10/23/2023    Final   Lab Visit on 10/20/2023   Component Date Value Ref Range Status    Triglycerides 10/20/2023 296 (H)  35 - 150 mg/dL Final    Cholesterol 10/20/2023 174  0 - 200 mg/dL Final    HDL Cholesterol 10/20/2023 50  40 - 60 mg/dL Final    Cholesterol/HDL Ratio (Risk Factor) 10/20/2023 3.5   Final    Non-HDL 10/20/2023 124  mg/dL Final    LDL Calculated 10/20/2023 65  mg/dL Final    LDL/HDL 10/20/2023 1.3   Final    VLDL 10/20/2023 59  mg/dL Final    Glucose, Fasting 10/20/2023 151 (H)  74 - 106 mg/dL Final         No orders of the defined types were placed in this encounter.      Requested Prescriptions     Signed Prescriptions Disp Refills    DULoxetine (CYMBALTA) 30 MG capsule 90 capsule 1     Sig: Take 1 capsule (30 mg total) by mouth once daily.    gabapentin (NEURONTIN) 300 MG capsule 270 capsule 1     Sig: Take 2 capsules (600 mg total) by mouth 3 (three) times daily.       Assessment:     1. Sacroiliitis    2. Lumbar radiculopathy               Plan:    Not using narcotics from our office    Physical therapy notes [facility] February 2024    Follow-up after bilateral sacroiliac RF TC March 26, 2024, patient had 80% relief after procedure, the procedure did help improve her level of function     She would like to continue conservative management this time     She states nonnarcotic medications helping    She is considering holding the Cymbalta due to constipation    Consider bilateral hip injections for bilateral hip pain and L3-4 epidural steroid injection for  lumbar radiculopathy    X-ray lumbar spine [facility] December 6, 2023, no fracture noted multiple level degenerative changes mild anterior listhesis L4/5    X-ray bilateral hips /sacroiliac joint [facility] December 6, 2023 no fracture noted mild-to-moderate degenerative changes sacroiliac joint bilateral hips    Continue Cymbalta, gabapentin on a daily basis     Will continue home exercise program as directed     Follow-up 6 months, patient request    Dr. Neff March 2025    Bring original prescription medication bottles/container/box with labels to each visit

## 2024-04-10 ENCOUNTER — OFFICE VISIT (OUTPATIENT)
Dept: PAIN MEDICINE | Facility: CLINIC | Age: 59
End: 2024-04-10
Payer: COMMERCIAL

## 2024-04-10 VITALS
DIASTOLIC BLOOD PRESSURE: 73 MMHG | RESPIRATION RATE: 18 BRPM | HEART RATE: 76 BPM | BODY MASS INDEX: 28.79 KG/M2 | SYSTOLIC BLOOD PRESSURE: 132 MMHG | HEIGHT: 68 IN | WEIGHT: 190 LBS

## 2024-04-10 DIAGNOSIS — M54.16 LUMBAR RADICULOPATHY: Chronic | ICD-10-CM

## 2024-04-10 DIAGNOSIS — M46.1 SACROILIITIS: Primary | Chronic | ICD-10-CM

## 2024-04-10 PROCEDURE — 99214 OFFICE O/P EST MOD 30 MIN: CPT | Mod: S$PBB,,, | Performed by: PHYSICIAN ASSISTANT

## 2024-04-10 PROCEDURE — 99214 OFFICE O/P EST MOD 30 MIN: CPT | Mod: PBBFAC | Performed by: PHYSICIAN ASSISTANT

## 2024-04-10 RX ORDER — GABAPENTIN 300 MG/1
300 CAPSULE ORAL 2 TIMES DAILY
Qty: 180 CAPSULE | Refills: 1 | Status: SHIPPED | OUTPATIENT
Start: 2024-04-10 | End: 2024-04-10

## 2024-04-10 RX ORDER — GABAPENTIN 300 MG/1
600 CAPSULE ORAL 3 TIMES DAILY
Qty: 270 CAPSULE | Refills: 1 | Status: SHIPPED | OUTPATIENT
Start: 2024-04-10 | End: 2024-07-09

## 2024-04-10 RX ORDER — DULOXETIN HYDROCHLORIDE 30 MG/1
30 CAPSULE, DELAYED RELEASE ORAL DAILY
Qty: 90 CAPSULE | Refills: 1 | Status: SHIPPED | OUTPATIENT
Start: 2024-04-10 | End: 2024-10-07

## 2024-04-24 ENCOUNTER — PATIENT MESSAGE (OUTPATIENT)
Dept: PAIN MEDICINE | Facility: CLINIC | Age: 59
End: 2024-04-24
Payer: COMMERCIAL

## 2024-04-24 NOTE — PROGRESS NOTES
Subjective:         Patient ID: Ene Clemons is a 58 y.o. female.    Chief Complaint: Tailbone Pain      Pain  This is a chronic problem. The current episode started more than 1 year ago. The problem has been waxing and waning. Associated symptoms include arthralgias. Pertinent negatives include no anorexia, change in bowel habit, chest pain, chills, coughing, diaphoresis, fever, neck pain, rash, sore throat, swollen glands, urinary symptoms, vertigo or vomiting.     Review of Systems   Constitutional:  Negative for activity change, appetite change, chills, diaphoresis, fever and unexpected weight change.   HENT:  Negative for drooling, ear discharge, ear pain, facial swelling, nosebleeds, sore throat, trouble swallowing, voice change and goiter.    Eyes:  Negative for photophobia, pain, discharge, redness and visual disturbance.   Respiratory:  Negative for apnea, cough, choking, chest tightness, shortness of breath, wheezing and stridor.    Cardiovascular:  Negative for chest pain, palpitations and leg swelling.   Gastrointestinal:  Negative for abdominal distention, anorexia, change in bowel habit, diarrhea, rectal pain, vomiting and fecal incontinence.   Endocrine: Negative for cold intolerance, heat intolerance, polydipsia, polyphagia and polyuria.   Genitourinary:  Negative for bladder incontinence, dysuria, flank pain, frequency and hot flashes.   Musculoskeletal:  Positive for arthralgias, back pain and leg pain. Negative for neck pain.   Integumentary:  Negative for color change, pallor and rash.   Allergic/Immunologic: Negative for immunocompromised state.   Neurological:  Negative for dizziness, vertigo, seizures, syncope, facial asymmetry, speech difficulty, light-headedness, memory loss and coordination difficulties.   Hematological:  Negative for adenopathy. Does not bruise/bleed easily.   Psychiatric/Behavioral:  Negative for agitation, behavioral problems, confusion, decreased concentration,  dysphoric mood, hallucinations, self-injury and suicidal ideas. The patient is not nervous/anxious and is not hyperactive.            Past Medical History:   Diagnosis Date    Arthritis     Depression     Diabetic eye exam 2023    Dr. Dueñas [facility]    Hyperlipidemia     Hypothyroidism     Thyroid disease      Past Surgical History:   Procedure Laterality Date    ADENOIDECTOMY      CHOLECYSTECTOMY      HYSTERECTOMY      RADIOFREQUENCY THERMOCOAGULATION Bilateral 3/26/2024    Procedure: RADIOFREQUENCY THERMAL COAGULATION SI;  Surgeon: Radhika Neff MD;  Location: [facility];  Service: Pain Management;  Laterality: Bilateral;    TONSILLECTOMY      TOTAL THYROIDECTOMY       Social History     Socioeconomic History    Marital status:      Spouse name: Hector    Number of children: 0   Occupational History    Occupation: retired   Tobacco Use    Smoking status: Former     Current packs/day: 0.00     Average packs/day: 0.5 packs/day for 39.0 years (19.5 ttl pk-yrs)     Types: Cigarettes, Vaping with nicotine     Start date:      Quit date:      Years since quittin.3     Passive exposure: Current    Smokeless tobacco: Never   Substance and Sexual Activity    Alcohol use: Not Currently    Drug use: Never    Sexual activity: Yes     Partners: Male     Social Determinants of Health     Financial Resource Strain: High Risk (2024)    Overall Financial Resource Strain (CARDIA)     Difficulty of Paying Living Expenses: Hard   Food Insecurity: No Food Insecurity (2024)    Hunger Vital Sign     Worried About Running Out of Food in the Last Year: Never true     Ran Out of Food in the Last Year: Never true   Transportation Needs: No Transportation Needs (2024)    PRAPARE - Transportation     Lack of Transportation (Medical): No     Lack of Transportation (Non-Medical): No   Physical Activity: Insufficiently Active (2024)    Exercise Vital Sign     Days of  "Exercise per Week: 2 days     Minutes of Exercise per Session: 20 min   Stress: Stress Concern Present (4/23/2024)    [employer] [place] [employer] - Occupational Stress Questionnaire     Feeling of Stress : To some extent   Housing Stability: Low Risk  (1/28/2022)    Housing Stability Vital Sign     Unable to Pay for Housing in the Last Year: No     Number of Places Lived in the Last Year: 0     Unstable Housing in the Last Year: No     Family History   Problem Relation Name Age of Onset    No Known Problems Mother      Arthritis Father      Heart disease Father      Cancer Father      No Known Problems Sister       Review of patient's allergies indicates:   Allergen Reactions    Doxycycline     Sulfa (sulfonamide antibiotics)         Objective:  Vitals:    04/30/24 1404   BP: 136/69   Pulse: 88   Resp: 16   Weight: 86.6 kg (191 lb)   Height: 5' 8" (1.727 m)   PainSc:   8           Physical Exam  Vitals and nursing note reviewed. Exam conducted with a chaperone present.   Constitutional:       General: She is awake. She is not in acute distress.     Appearance: Normal appearance. She is not ill-appearing, toxic-appearing or diaphoretic.   HENT:      Head: Normocephalic and atraumatic.      Nose: Nose normal.      Mouth/Throat:      Mouth: Mucous membranes are moist.      Pharynx: Oropharynx is clear.   Eyes:      Conjunctiva/sclera: Conjunctivae normal.      Pupils: Pupils are equal, round, and reactive to light.   Cardiovascular:      Rate and Rhythm: Normal rate.   Pulmonary:      Effort: Pulmonary effort is normal. No respiratory distress.   Abdominal:      Palpations: Abdomen is soft.      Tenderness: There is no guarding.   Musculoskeletal:         General: Normal range of motion.      Cervical back: Normal range of motion and neck supple. No rigidity.   Skin:     General: Skin is warm and dry.      Coloration: Skin is not jaundiced or pale.   Neurological:      General: No focal deficit present.    "   Mental Status: She is alert and oriented to person, place, and time. Mental status is at baseline.      Cranial Nerves: No cranial nerve deficit (II-XII).   Psychiatric:         Mood and Affect: Mood normal.         Behavior: Behavior normal. Behavior is cooperative.         Thought Content: Thought content normal.           [place] [facility] for Pain Management  See OP Notes for results.     IMPRESSION: See OP Notes for results.     This procedure was auto-finalized by: Virtual Radiologist       Office Visit on 04/26/2024   Component Date Value Ref Range Status    Color, UA 04/26/2024 Yellow   Final    Spec Grav UA 04/26/2024 1.030   Final    pH, UA 04/26/2024 6.0   Final    WBC, UA 04/26/2024 lg   Final    Nitrite, UA 04/26/2024 pos   Final    Protein, POC 04/26/2024 300   Final    Glucose, UA 04/26/2024 neg   Final    Ketones, UA 04/26/2024 neg   Final    Bilirubin, POC 04/26/2024 sm   Final    Urobilinogen, UA 04/26/2024 0.2   Final    Blood, UA 04/26/2024 lg   Final    Creatinine, Urine 04/26/2024 144  28 - 219 mg/dL Final    Microalbumin 04/26/2024 72.3 (H)  0.0 - 2.8 mg/dL Final    Microalbumin/Creatinine Ratio 04/26/2024 502.1 (H)  0.0 - 30.0 mg/g Final    Culture, Urine 04/26/2024 >100,000 Escherichia coli (A)   Final    Hemoglobin A1C 04/26/2024 5.5  4.5 - 6.6 % Final    Estimated Average Glucose 04/26/2024 111  mg/dL Final    Sodium 04/26/2024 141  136 - 145 mmol/L Final    Potassium 04/26/2024 4.0  3.5 - 5.1 mmol/L Final    Chloride 04/26/2024 109 (H)  98 - 107 mmol/L Final    CO2 04/26/2024 23  21 - 32 mmol/L Final    Anion Gap 04/26/2024 13  7 - 16 mmol/L Final    Glucose 04/26/2024 96  74 - 106 mg/dL Final    BUN 04/26/2024 16  7 - 18 mg/dL Final    Creatinine 04/26/2024 0.66  0.55 - 1.02 mg/dL Final    BUN/Creatinine Ratio 04/26/2024 24 (H)  6 - 20 Final    Calcium 04/26/2024 9.4  8.5 - 10.1 mg/dL Final    Total Protein 04/26/2024 7.6  6.4 - 8.2 g/dL Final    Albumin 04/26/2024 3.7  3.5 - 5.0 g/dL Final     Globulin 04/26/2024 3.9  2.0 - 4.0 g/dL Final    A/G Ratio 04/26/2024 0.9   Final    Bilirubin, Total 04/26/2024 0.4  >0.0 - 1.2 mg/dL Final    Alk Phos 04/26/2024 122 (H)  46 - 118 U/L Final    ALT 04/26/2024 24  13 - 56 U/L Final    AST 04/26/2024 27  15 - 37 U/L Final    eGFR 04/26/2024 101  >=60 mL/min/1.73m2 Final    Triglycerides 04/26/2024 282 (H)  35 - 150 mg/dL Final    Cholesterol 04/26/2024 162  0 - 200 mg/dL Final    HDL Cholesterol 04/26/2024 45  40 - 60 mg/dL Final    Cholesterol/HDL Ratio (Risk Factor) 04/26/2024 3.6   Final    Non-HDL 04/26/2024 117  mg/dL Final    LDL Calculated 04/26/2024 61  mg/dL Final    LDL/HDL 04/26/2024 1.4   Final    VLDL 04/26/2024 56  mg/dL Final    TSH 04/26/2024 0.276 (L)  0.358 - 3.740 uIU/mL Final    WBC 04/26/2024 14.71 (H)  4.50 - 11.00 K/uL Final    RBC 04/26/2024 4.64  4.20 - 5.40 M/uL Final    Hemoglobin 04/26/2024 13.0  12.0 - 16.0 g/dL Final    Hematocrit 04/26/2024 40.4  38.0 - 47.0 % Final    MCV 04/26/2024 87.1  80.0 - 96.0 fL Final    MCH 04/26/2024 28.0  27.0 - 31.0 pg Final    MCHC 04/26/2024 32.2  32.0 - 36.0 g/dL Final    RDW 04/26/2024 12.8  11.5 - 14.5 % Final    Platelet Count 04/26/2024 256  150 - 400 K/uL Final    MPV 04/26/2024 10.2  9.4 - 12.4 fL Final    Neutrophils % 04/26/2024 78.6 (H)  53.0 - 65.0 % Final    Lymphocytes % 04/26/2024 12.0 (L)  27.0 - 41.0 % Final    Monocytes % 04/26/2024 6.1 (H)  2.0 - 6.0 % Final    Eosinophils % 04/26/2024 2.0  1.0 - 4.0 % Final    Basophils % 04/26/2024 0.5  0.0 - 1.0 % Final    Immature Granulocytes % 04/26/2024 0.8 (H)  0.0 - 0.4 % Final    nRBC, Auto 04/26/2024 0.0  <=0.0 % Final    Neutrophils, Abs 04/26/2024 11.56 (H)  1.80 - 7.70 K/uL Final    Lymphocytes, Absolute 04/26/2024 1.77  1.00 - 4.80 K/uL Final    Monocytes, Absolute 04/26/2024 0.89 (H)  0.00 - 0.80 K/uL Final    Eosinophils, Absolute 04/26/2024 0.30  0.00 - 0.50 K/uL Final    Basophils, Absolute 04/26/2024 0.07  0.00 - 0.20 K/uL Final     Immature Granulocytes, Absolute 04/26/2024 0.12 (H)  0.00 - 0.04 K/uL Final    nRBC, Absolute 04/26/2024 0.00  <=0.00 x10e3/uL Final    Diff Type 04/26/2024 Auto   Final   Office Visit on 12/06/2023   Component Date Value Ref Range Status    POC Amphetamines 12/06/2023 Negative  Negative, Inconclusive Final    POC Barbiturates 12/06/2023 Negative  Negative, Inconclusive Final    POC Benzodiazepines 12/06/2023 Presumptive Positive (A)  Negative, Inconclusive Final    POC Cocaine 12/06/2023 Negative  Negative, Inconclusive Final    POC THC 12/06/2023 Negative  Negative, Inconclusive Final    POC Methadone 12/06/2023 Negative  Negative, Inconclusive Final    POC Methamphetamine 12/06/2023 Negative  Negative, Inconclusive Final    POC Opiates 12/06/2023 Negative  Negative, Inconclusive Final    POC Oxycodone 12/06/2023 Negative  Negative, Inconclusive Final    POC Phencyclidine 12/06/2023 Negative  Negative, Inconclusive Final    POC Methylenedioxymethamphetamine * 12/06/2023 Negative  Negative, Inconclusive Final    POC Tricyclic Antidepressants 12/06/2023 Negative  Negative, Inconclusive Final    POC Buprenorphine 12/06/2023 Negative   Final     Acceptable 12/06/2023 Yes   Final    POC Temperature (Urine) 12/06/2023 94   Final    [place] Generic Orderable 12/06/2023 SEE COMMENTS   Final   Patient Outreach on 11/06/2023   Component Date Value Ref Range Status    Left Eye DM Retinopathy 02/27/2023 Negative   Final    Right Eye DM Retinopathy 02/27/2023 Negative   Final         No orders of the defined types were placed in this encounter.      Requested Prescriptions     Signed Prescriptions Disp Refills    methylPREDNISolone (MEDROL DOSEPACK) 4 mg tablet 21 tablet 0     Sig: use as directed       Assessment:     1. Lumbar radiculopathy    2. Sacroiliitis                 Plan:    Saw patient April 10, 2024        Not using narcotics from our office    Physical therapy notes [facility] February 2024    She  had bilateral sacroiliac RF TC March 26, 2024, patient had 80% relief after procedure, the procedure did help improve her level of function     Complaint mid lower back pain worse with flexion extension rotation lumbar spine ongoing for several weeks now    Denies recent illness or injury denies loss of bowel or bladder function    Consider bilateral hip injections for bilateral hip pain and L3-4 epidural steroid injection for lumbar radiculopathy    X-ray lumbar spine [facility] December 6, 2023, no fracture noted multiple level degenerative changes mild anterior listhesis L4/5    X-ray bilateral hips /sacroiliac joint [facility] December 6, 2023 no fracture noted mild-to-moderate degenerative changes sacroiliac joint bilateral hips    Continue Cymbalta, gabapentin on a daily basis     Toradol 60 mg IM, tolerated well     Medrol Dosepak as directed    Will continue home exercise program as directed     Follow-up 6 months, patient request    Dr. Neff March 2025    Bring original prescription medication bottles/container/box with labels to each visit

## 2024-04-26 ENCOUNTER — OFFICE VISIT (OUTPATIENT)
Dept: FAMILY MEDICINE | Facility: CLINIC | Age: 59
End: 2024-04-26
Payer: COMMERCIAL

## 2024-04-26 VITALS
SYSTOLIC BLOOD PRESSURE: 132 MMHG | TEMPERATURE: 98 F | DIASTOLIC BLOOD PRESSURE: 78 MMHG | BODY MASS INDEX: 28.79 KG/M2 | OXYGEN SATURATION: 98 % | WEIGHT: 190 LBS | HEART RATE: 87 BPM | HEIGHT: 68 IN

## 2024-04-26 DIAGNOSIS — R30.0 DYSURIA: ICD-10-CM

## 2024-04-26 DIAGNOSIS — R31.9 URINARY TRACT INFECTION WITH HEMATURIA, SITE UNSPECIFIED: Primary | ICD-10-CM

## 2024-04-26 DIAGNOSIS — Z79.4 TYPE 2 DIABETES MELLITUS WITHOUT COMPLICATION, WITH LONG-TERM CURRENT USE OF INSULIN: ICD-10-CM

## 2024-04-26 DIAGNOSIS — M54.16 LUMBAR RADICULOPATHY: ICD-10-CM

## 2024-04-26 DIAGNOSIS — K21.9 GASTROESOPHAGEAL REFLUX DISEASE, UNSPECIFIED WHETHER ESOPHAGITIS PRESENT: ICD-10-CM

## 2024-04-26 DIAGNOSIS — E03.9 ACQUIRED HYPOTHYROIDISM: ICD-10-CM

## 2024-04-26 DIAGNOSIS — N39.0 URINARY TRACT INFECTION WITH HEMATURIA, SITE UNSPECIFIED: Primary | ICD-10-CM

## 2024-04-26 DIAGNOSIS — E11.9 TYPE 2 DIABETES MELLITUS WITHOUT COMPLICATION, WITH LONG-TERM CURRENT USE OF INSULIN: ICD-10-CM

## 2024-04-26 LAB
ALBUMIN SERPL BCP-MCNC: 3.7 G/DL (ref 3.5–5)
ALBUMIN/GLOB SERPL: 0.9 {RATIO}
ALP SERPL-CCNC: 122 U/L (ref 46–118)
ALT SERPL W P-5'-P-CCNC: 24 U/L (ref 13–56)
ANION GAP SERPL CALCULATED.3IONS-SCNC: 13 MMOL/L (ref 7–16)
AST SERPL W P-5'-P-CCNC: 27 U/L (ref 15–37)
BASOPHILS # BLD AUTO: 0.07 K/UL (ref 0–0.2)
BASOPHILS NFR BLD AUTO: 0.5 % (ref 0–1)
BILIRUB SERPL-MCNC: 0.4 MG/DL (ref ?–1.2)
BILIRUB SERPL-MCNC: NORMAL MG/DL
BLOOD URINE, POC: NORMAL
BUN SERPL-MCNC: 16 MG/DL (ref 7–18)
BUN/CREAT SERPL: 24 (ref 6–20)
CALCIUM SERPL-MCNC: 9.4 MG/DL (ref 8.5–10.1)
CHLORIDE SERPL-SCNC: 109 MMOL/L (ref 98–107)
CHOLEST SERPL-MCNC: 162 MG/DL (ref 0–200)
CHOLEST/HDLC SERPL: 3.6 {RATIO}
CO2 SERPL-SCNC: 23 MMOL/L (ref 21–32)
COLOR, POC UA: YELLOW
CREAT SERPL-MCNC: 0.66 MG/DL (ref 0.55–1.02)
CREAT UR-MCNC: 144 MG/DL (ref 28–219)
DIFFERENTIAL METHOD BLD: ABNORMAL
EGFR (NO RACE VARIABLE) (RUSH/TITUS): 101 ML/MIN/1.73M2
EOSINOPHIL # BLD AUTO: 0.3 K/UL (ref 0–0.5)
EOSINOPHIL NFR BLD AUTO: 2 % (ref 1–4)
ERYTHROCYTE [DISTWIDTH] IN BLOOD BY AUTOMATED COUNT: 12.8 % (ref 11.5–14.5)
EST. AVERAGE GLUCOSE BLD GHB EST-MCNC: 111 MG/DL
GLOBULIN SER-MCNC: 3.9 G/DL (ref 2–4)
GLUCOSE SERPL-MCNC: 96 MG/DL (ref 74–106)
GLUCOSE UR QL STRIP: NORMAL
HBA1C MFR BLD HPLC: 5.5 % (ref 4.5–6.6)
HCT VFR BLD AUTO: 40.4 % (ref 38–47)
HDLC SERPL-MCNC: 45 MG/DL (ref 40–60)
HGB BLD-MCNC: 13 G/DL (ref 12–16)
IMM GRANULOCYTES # BLD AUTO: 0.12 K/UL (ref 0–0.04)
IMM GRANULOCYTES NFR BLD: 0.8 % (ref 0–0.4)
KETONES UR QL STRIP: NORMAL
LDLC SERPL CALC-MCNC: 61 MG/DL
LDLC/HDLC SERPL: 1.4 {RATIO}
LEUKOCYTE ESTERASE URINE, POC: NORMAL
LYMPHOCYTES # BLD AUTO: 1.77 K/UL (ref 1–4.8)
LYMPHOCYTES NFR BLD AUTO: 12 % (ref 27–41)
MCH RBC QN AUTO: 28 PG (ref 27–31)
MCHC RBC AUTO-ENTMCNC: 32.2 G/DL (ref 32–36)
MCV RBC AUTO: 87.1 FL (ref 80–96)
MICROALBUMIN UR-MCNC: 72.3 MG/DL (ref 0–2.8)
MICROALBUMIN/CREAT RATIO PNL UR: 502.1 MG/G (ref 0–30)
MONOCYTES # BLD AUTO: 0.89 K/UL (ref 0–0.8)
MONOCYTES NFR BLD AUTO: 6.1 % (ref 2–6)
MPC BLD CALC-MCNC: 10.2 FL (ref 9.4–12.4)
NEUTROPHILS # BLD AUTO: 11.56 K/UL (ref 1.8–7.7)
NEUTROPHILS NFR BLD AUTO: 78.6 % (ref 53–65)
NITRITE, POC UA: NORMAL
NONHDLC SERPL-MCNC: 117 MG/DL
NRBC # BLD AUTO: 0 X10E3/UL
NRBC, AUTO (.00): 0 %
PH, POC UA: 6
PLATELET # BLD AUTO: 256 K/UL (ref 150–400)
POTASSIUM SERPL-SCNC: 4 MMOL/L (ref 3.5–5.1)
PROT SERPL-MCNC: 7.6 G/DL (ref 6.4–8.2)
PROTEIN, POC: 300
RBC # BLD AUTO: 4.64 M/UL (ref 4.2–5.4)
SODIUM SERPL-SCNC: 141 MMOL/L (ref 136–145)
SPECIFIC GRAVITY, POC UA: 1.03
TRIGL SERPL-MCNC: 282 MG/DL (ref 35–150)
TSH SERPL DL<=0.005 MIU/L-ACNC: 0.28 UIU/ML (ref 0.36–3.74)
UROBILINOGEN, POC UA: 0.2
VLDLC SERPL-MCNC: 56 MG/DL
WBC # BLD AUTO: 14.71 K/UL (ref 4.5–11)

## 2024-04-26 PROCEDURE — 82570 ASSAY OF URINE CREATININE: CPT | Mod: ,,, | Performed by: CLINICAL MEDICAL LABORATORY

## 2024-04-26 PROCEDURE — 82043 UR ALBUMIN QUANTITATIVE: CPT | Mod: ,,, | Performed by: CLINICAL MEDICAL LABORATORY

## 2024-04-26 PROCEDURE — 99214 OFFICE O/P EST MOD 30 MIN: CPT | Mod: ,,, | Performed by: FAMILY MEDICINE

## 2024-04-26 PROCEDURE — 87077 CULTURE AEROBIC IDENTIFY: CPT | Mod: ,,, | Performed by: CLINICAL MEDICAL LABORATORY

## 2024-04-26 PROCEDURE — 87186 SC STD MICRODIL/AGAR DIL: CPT | Mod: ,,, | Performed by: CLINICAL MEDICAL LABORATORY

## 2024-04-26 PROCEDURE — 80050 GENERAL HEALTH PANEL: CPT | Mod: ,,, | Performed by: CLINICAL MEDICAL LABORATORY

## 2024-04-26 PROCEDURE — 83036 HEMOGLOBIN GLYCOSYLATED A1C: CPT | Mod: ,,, | Performed by: CLINICAL MEDICAL LABORATORY

## 2024-04-26 PROCEDURE — 87086 URINE CULTURE/COLONY COUNT: CPT | Mod: ,,, | Performed by: CLINICAL MEDICAL LABORATORY

## 2024-04-26 PROCEDURE — 80061 LIPID PANEL: CPT | Mod: ,,, | Performed by: CLINICAL MEDICAL LABORATORY

## 2024-04-26 PROCEDURE — 81003 URINALYSIS AUTO W/O SCOPE: CPT | Mod: QW,,, | Performed by: FAMILY MEDICINE

## 2024-04-26 RX ORDER — NITROFURANTOIN 25; 75 MG/1; MG/1
100 CAPSULE ORAL 2 TIMES DAILY
Qty: 10 CAPSULE | Refills: 0 | Status: SHIPPED | OUTPATIENT
Start: 2024-04-26

## 2024-04-26 RX ORDER — LEVOTHYROXINE SODIUM 112 UG/1
112 TABLET ORAL DAILY
Qty: 90 TABLET | Refills: 1 | Status: SHIPPED | OUTPATIENT
Start: 2024-04-26 | End: 2024-05-13

## 2024-04-26 RX ORDER — CELECOXIB 200 MG/1
200 CAPSULE ORAL DAILY
Qty: 90 CAPSULE | Refills: 1 | Status: SHIPPED | OUTPATIENT
Start: 2024-04-26

## 2024-04-26 RX ORDER — OMEPRAZOLE 40 MG/1
40 CAPSULE, DELAYED RELEASE ORAL DAILY
Qty: 90 CAPSULE | Refills: 1 | Status: SHIPPED | OUTPATIENT
Start: 2024-04-26 | End: 2024-10-23

## 2024-04-26 NOTE — PROGRESS NOTES
New Clinic Note    Ene Clemons is a 58 y.o. female     CC:   Chief Complaint   Patient presents with    Follow-up     Pt is here for 6 month follow up visit on chronic health issues.     Urinary Tract Infection     Pt also believes she might have a UTI. She states she is burning when she urinates and she noticed some blood when she wiped this morning. She is also having increased frequency.  PT stated if we prescribe Rx for UTI please send that medication to [facility] here in [place].     Health Maintenance     Discussed health maintenance with pt. Stated her mammogram is scheduled and she did have a shingles vaccine. She is getting her diabetic labs done today. She is willing to do urine screening as well. Denies all other care gaps at this time. Pt is going to schedule eye exam.         Subjective    History of Present Illness HPI   Patient is for evaluation of chronic medical problems. Patient needs refills. Ene  is tolerating medications well without side effects.   She complains of dysuria and hematuria for a few days. Patient is only taking one metformin a day.    Current Outpatient Medications:     citalopram (CELEXA) 40 MG tablet, Take 1 tablet (40 mg total) by mouth once daily., Disp: 90 tablet, Rfl: 1    DULoxetine (CYMBALTA) 30 MG capsule, Take 1 capsule (30 mg total) by mouth once daily., Disp: 90 capsule, Rfl: 1    gabapentin (NEURONTIN) 300 MG capsule, Take 2 capsules (600 mg total) by mouth 3 (three) times daily., Disp: 270 capsule, Rfl: 1    HYDROcodone-acetaminophen (NORCO) 7.5-325 mg per tablet, Take 1 tablet by mouth 2 (two) times daily as needed., Disp: , Rfl:     metFORMIN (GLUCOPHAGE) 1000 MG tablet, Take 1 tablet (1,000 mg total) by mouth 2 (two) times daily., Disp: 180 tablet, Rfl: 1    simvastatin (ZOCOR) 10 MG tablet, Take 1 tablet (10 mg total) by mouth every evening., Disp: 90 tablet, Rfl: 1    acyclovir (ZOVIRAX) 400 MG tablet, Take 1 tablet (400 mg total) by mouth 2 (two) times  daily., Disp: 60 tablet, Rfl: 11    celecoxib (CELEBREX) 200 MG capsule, Take 1 capsule (200 mg total) by mouth once daily., Disp: 90 capsule, Rfl: 1    EUTHYROX 112 mcg tablet, Take 1 tablet (112 mcg total) by mouth once daily., Disp: 90 tablet, Rfl: 1    methocarbamoL (ROBAXIN) 500 MG Tab, Take 1 tablet (500 mg total) by mouth 3 (three) times daily as needed., Disp: 90 tablet, Rfl: 1    nitrofurantoin, macrocrystal-monohydrate, (MACROBID) 100 MG capsule, Take 1 capsule (100 mg total) by mouth 2 (two) times daily., Disp: 10 capsule, Rfl: 0    omeprazole (PRILOSEC) 40 MG capsule, Take 1 capsule (40 mg total) by mouth once daily., Disp: 90 capsule, Rfl: 1     Past Medical History:   Diagnosis Date    Arthritis     Depression     Diabetic eye exam 02/27/2023    Dr. Dueñas [facility]    Hyperlipidemia     Hypothyroidism     Thyroid disease         Family History   Problem Relation Name Age of Onset    No Known Problems Mother      Arthritis Father      Heart disease Father      Cancer Father      No Known Problems Sister          Past Surgical History:   Procedure Laterality Date    ADENOIDECTOMY      CHOLECYSTECTOMY      HYSTERECTOMY      RADIOFREQUENCY THERMOCOAGULATION Bilateral 3/26/2024    Procedure: RADIOFREQUENCY THERMAL COAGULATION SI;  Surgeon: Radhika Neff MD;  Location: [facility];  Service: Pain Management;  Laterality: Bilateral;    TONSILLECTOMY      TOTAL THYROIDECTOMY          Review of Systems   Constitutional:  Negative for fatigue and fever.   HENT:  Negative for ear pain, postnasal drip, rhinorrhea and sinus pressure/congestion.    Respiratory:  Negative for cough and shortness of breath.    Cardiovascular:  Negative for chest pain.   Gastrointestinal:  Negative for abdominal pain, diarrhea, nausea and vomiting.   Genitourinary:  Positive for dysuria, frequency and hematuria.   Neurological:  Negative for headaches.        /78 (BP Location: Left arm, Patient  "Position: Sitting, BP Method: Medium (Manual))   Pulse 87   Temp 97.6 °F (36.4 °C) (Oral)   Ht 5' 8" (1.727 m)   Wt 86.2 kg (190 lb)   SpO2 98%   BMI 28.89 kg/m²      Physical Exam  Cardiovascular:      Pulses:           Dorsalis pedis pulses are 2+ on the right side and 2+ on the left side.   Feet:      Right foot:      Protective Sensation: 10 sites tested.  10 sites sensed.      Skin integrity: Dry skin present.      Toenail Condition: Right toenails are normal.      Left foot:      Protective Sensation: 10 sites tested.  10 sites sensed.      Skin integrity: Dry skin present.      Toenail Condition: Left toenails are normal.          Assessment and Plan      ICD-10-CM ICD-9-CM   1. Urinary tract infection with hematuria, site unspecified  N39.0 599.0    R31.9 599.70   2. Gastroesophageal reflux disease, unspecified whether esophagitis present  K21.9 530.81   3. Lumbar radiculopathy  M54.16 724.4   4. Acquired hypothyroidism  E03.9 244.9   5. Dysuria  R30.0 788.1   6. Type 2 diabetes mellitus without complication, with long-term current use of insulin  E11.9 250.00    Z79.4 V58.67        1. Urinary tract infection with hematuria, site unspecified  Send urine for culture. Macrobid for infection.   -     nitrofurantoin, macrocrystal-monohydrate, (MACROBID) 100 MG capsule; Take 1 capsule (100 mg total) by mouth 2 (two) times daily.  Dispense: 10 capsule; Refill: 0    2. Gastroesophageal reflux disease, unspecified whether esophagitis present  The current medical regimen is effective;  continue present plan and medications.  -     omeprazole (PRILOSEC) 40 MG capsule; Take 1 capsule (40 mg total) by mouth once daily.  Dispense: 90 capsule; Refill: 1    3. Lumbar radiculopathy  The current medical regimen is effective;  continue present plan and medications.  -     celecoxib (CELEBREX) 200 MG capsule; Take 1 capsule (200 mg total) by mouth once daily.  Dispense: 90 capsule; Refill: 1    4. Acquired " hypothyroidism  The current medical regimen is effective;  continue present plan and medications.  -     EUTHYROX 112 mcg tablet; Take 1 tablet (112 mcg total) by mouth once daily.  Dispense: 90 tablet; Refill: 1  -     TSH; Future; Expected date: 04/26/2024    5. Dysuria  -     Urine culture; Future; Expected date: 04/26/2024  -     POCT URINALYSIS W/O SCOPE    6. Type 2 diabetes mellitus without complication, with long-term current use of insulin  The current medical regimen is effective;  continue present plan and medications.  -     Cancel: Hemoglobin A1C; Future; Expected date: 04/26/2024  -     Hemoglobin A1C; Future; Expected date: 04/26/2024  -     Microalbumin/Creatinine Ratio, Urine  -     Comprehensive Metabolic Panel; Future; Expected date: 04/26/2024  -     CBC Auto Differential; Future; Expected date: 04/26/2024  -     Lipid Panel; Future; Expected date: 04/26/2024        Follow up in about 6 months (around 10/26/2024).     Patient will schedule her eye exam.

## 2024-04-28 LAB — UA COMPLETE W REFLEX CULTURE PNL UR: ABNORMAL

## 2024-04-30 ENCOUNTER — OFFICE VISIT (OUTPATIENT)
Dept: PAIN MEDICINE | Facility: CLINIC | Age: 59
End: 2024-04-30
Payer: COMMERCIAL

## 2024-04-30 VITALS
HEART RATE: 88 BPM | DIASTOLIC BLOOD PRESSURE: 69 MMHG | BODY MASS INDEX: 28.95 KG/M2 | HEIGHT: 68 IN | RESPIRATION RATE: 16 BRPM | WEIGHT: 191 LBS | SYSTOLIC BLOOD PRESSURE: 136 MMHG

## 2024-04-30 DIAGNOSIS — M54.16 LUMBAR RADICULOPATHY: Chronic | ICD-10-CM

## 2024-04-30 DIAGNOSIS — M46.1 SACROILIITIS: Chronic | ICD-10-CM

## 2024-04-30 PROCEDURE — 99214 OFFICE O/P EST MOD 30 MIN: CPT | Mod: PBBFAC,25 | Performed by: PHYSICIAN ASSISTANT

## 2024-04-30 PROCEDURE — 99999PBSHW PR PBB SHADOW TECHNICAL ONLY FILED TO HB: Mod: PBBFAC,,,

## 2024-04-30 PROCEDURE — 99214 OFFICE O/P EST MOD 30 MIN: CPT | Mod: S$PBB,25,, | Performed by: PHYSICIAN ASSISTANT

## 2024-04-30 PROCEDURE — 96372 THER/PROPH/DIAG INJ SC/IM: CPT | Mod: PBBFAC | Performed by: PHYSICIAN ASSISTANT

## 2024-04-30 RX ORDER — KETOROLAC TROMETHAMINE 30 MG/ML
60 INJECTION, SOLUTION INTRAMUSCULAR; INTRAVENOUS
Status: COMPLETED | OUTPATIENT
Start: 2024-04-30 | End: 2024-04-30

## 2024-04-30 RX ORDER — METHYLPREDNISOLONE 4 MG/1
TABLET ORAL
Qty: 21 TABLET | Refills: 0 | Status: SHIPPED | OUTPATIENT
Start: 2024-04-30

## 2024-04-30 RX ADMIN — KETOROLAC TROMETHAMINE 60 MG: 30 INJECTION, SOLUTION INTRAMUSCULAR at 02:04

## 2024-05-13 RX ORDER — LEVOTHYROXINE SODIUM 100 UG/1
100 TABLET ORAL
Qty: 30 TABLET | Refills: 2 | Status: SHIPPED | OUTPATIENT
Start: 2024-05-13 | End: 2025-05-13

## 2024-05-14 ENCOUNTER — PATIENT MESSAGE (OUTPATIENT)
Dept: FAMILY MEDICINE | Facility: CLINIC | Age: 59
End: 2024-05-14
Payer: COMMERCIAL

## 2024-05-15 ENCOUNTER — OFFICE VISIT (OUTPATIENT)
Dept: FAMILY MEDICINE | Facility: CLINIC | Age: 59
End: 2024-05-15
Payer: COMMERCIAL

## 2024-05-15 VITALS
DIASTOLIC BLOOD PRESSURE: 86 MMHG | WEIGHT: 193 LBS | HEART RATE: 71 BPM | TEMPERATURE: 98 F | BODY MASS INDEX: 29.25 KG/M2 | RESPIRATION RATE: 18 BRPM | OXYGEN SATURATION: 98 % | HEIGHT: 68 IN | SYSTOLIC BLOOD PRESSURE: 126 MMHG

## 2024-05-15 DIAGNOSIS — R30.0 DYSURIA: Primary | ICD-10-CM

## 2024-05-15 LAB
BCS RECOMMENDATION EXT: NORMAL
BILIRUB SERPL-MCNC: NORMAL MG/DL
BLOOD URINE, POC: NORMAL
COLOR, POC UA: YELLOW
GLUCOSE UR QL STRIP: NORMAL
KETONES UR QL STRIP: NORMAL
LEUKOCYTE ESTERASE URINE, POC: NORMAL
NITRITE, POC UA: NORMAL
PH, POC UA: 5.5
PROTEIN, POC: NORMAL
SPECIFIC GRAVITY, POC UA: 1.02
UROBILINOGEN, POC UA: 0.2

## 2024-05-15 PROCEDURE — 81003 URINALYSIS AUTO W/O SCOPE: CPT | Mod: QW,,, | Performed by: NURSE PRACTITIONER

## 2024-05-15 PROCEDURE — 87086 URINE CULTURE/COLONY COUNT: CPT | Mod: ,,, | Performed by: CLINICAL MEDICAL LABORATORY

## 2024-05-15 PROCEDURE — 99214 OFFICE O/P EST MOD 30 MIN: CPT | Mod: ,,, | Performed by: NURSE PRACTITIONER

## 2024-05-15 RX ORDER — ONDANSETRON 4 MG/1
4 TABLET, ORALLY DISINTEGRATING ORAL EVERY 8 HOURS PRN
COMMUNITY
Start: 2024-05-10

## 2024-05-15 RX ORDER — TAMSULOSIN HYDROCHLORIDE 0.4 MG/1
1 CAPSULE ORAL DAILY
COMMUNITY
Start: 2024-05-10

## 2024-05-15 NOTE — PROGRESS NOTES
Dominique Gross DNP, ELISHA    [address] [place] [address]     PATIENT NAME: Ene Clemons  : 1965  DATE: 5/15/24  MRN: [id number]      Billing Provider: Dominique Gross DNP, FNP  Level of Service:   Patient PCP Information       Provider PCP Type    Lawanda Hargrove MD General            Reason for Visit / Chief Complaint: Urinary Tract Infection (Patient complains of recurrent lower left flank pain, urinary frequency, urinary urgency and dysuria and thinks she has a recurrent UTI. )       Update PCP  Update Chief Complaint         History of Present Illness / Problem Focused Workflow     Ene Clemons presents to the clinic with Urinary Tract Infection (Patient complains of recurrent lower left flank pain, urinary frequency, urinary urgency and dysuria and thinks she has a recurrent UTI. )         Review of Systems     Review of Systems   Constitutional:  Negative for appetite change, fatigue, fever and unexpected weight change.   HENT:  Negative for hearing loss.    Eyes:  Negative for visual disturbance.   Respiratory:  Negative for shortness of breath.    Cardiovascular:  Negative for chest pain.   Gastrointestinal:  Positive for abdominal pain. Negative for constipation, diarrhea, nausea and vomiting.   Genitourinary:  Positive for dysuria, frequency and urgency.   Musculoskeletal:  Negative for back pain.   Psychiatric/Behavioral:  Negative for sleep disturbance.         Medical / Social / Family History     Past Medical History:   Diagnosis Date    Arthritis     Depression     Diabetic eye exam 2023    Dr. Dueñas [facility]    Hyperlipidemia     Hypothyroidism     Thyroid disease        Past Surgical History:   Procedure Laterality Date    ADENOIDECTOMY      CHOLECYSTECTOMY      HYSTERECTOMY      RADIOFREQUENCY THERMOCOAGULATION Bilateral 3/26/2024    Procedure: RADIOFREQUENCY THERMAL COAGULATION SI;  Surgeon: Radhika Neff MD;   Location: [facility];  Service: Pain Management;  Laterality: Bilateral;    TONSILLECTOMY      TOTAL THYROIDECTOMY         Social History  Ms. Ene Clemons  reports that she quit smoking about 16 months ago. Her smoking use included cigarettes and vaping with nicotine. She started smoking about 40 years ago. She has a 19.5 pack-year smoking history. She has been exposed to tobacco smoke. She has never used smokeless tobacco. She reports that she does not currently use alcohol. She reports that she does not use drugs.    Family History  Ms. Ene Clemons's family history includes Arthritis in her father; Cancer in her father; Heart disease in her father; No Known Problems in her mother and sister.    Medications and Allergies     Medications  Outpatient Medications Marked as Taking for the 5/15/24 encounter (Office Visit) with Dominique Gross, DEEDEE, FNP   Medication Sig Dispense Refill    acyclovir (ZOVIRAX) 400 MG tablet Take 1 tablet (400 mg total) by mouth 2 (two) times daily. 60 tablet 11    celecoxib (CELEBREX) 200 MG capsule Take 1 capsule (200 mg total) by mouth once daily. 90 capsule 1    citalopram (CELEXA) 40 MG tablet Take 1 tablet (40 mg total) by mouth once daily. 90 tablet 1    DULoxetine (CYMBALTA) 30 MG capsule Take 1 capsule (30 mg total) by mouth once daily. 90 capsule 1    gabapentin (NEURONTIN) 300 MG capsule Take 2 capsules (600 mg total) by mouth 3 (three) times daily. 270 capsule 1    HYDROcodone-acetaminophen (NORCO) 7.5-325 mg per tablet Take 1 tablet by mouth 2 (two) times daily as needed.      levothyroxine (SYNTHROID) 100 MCG tablet Take 1 tablet (100 mcg total) by mouth before breakfast. 30 tablet 2    metFORMIN (GLUCOPHAGE) 1000 MG tablet Take 1 tablet (1,000 mg total) by mouth 2 (two) times daily. 180 tablet 1    omeprazole (PRILOSEC) 40 MG capsule Take 1 capsule (40 mg total) by mouth once daily. 90 capsule 1    ondansetron (ZOFRAN-ODT) 4 MG TbDL Take 4 mg by mouth every 8  "(eight) hours as needed (nausea).      simvastatin (ZOCOR) 10 MG tablet Take 1 tablet (10 mg total) by mouth every evening. 90 tablet 1    tamsulosin (FLOMAX) 0.4 mg Cap Take 1 capsule by mouth once daily.         Allergies  Review of patient's allergies indicates:   Allergen Reactions    Doxycycline     Sulfa (sulfonamide antibiotics)        Physical Examination     Vitals:    05/15/24 1436   BP: 126/86   BP Location: Left arm   Patient Position: Sitting   BP Method: Large (Automatic)   Pulse: 71   Resp: 18   Temp: 97.9 °F (36.6 °C)   TempSrc: Oral   SpO2: 98%   Weight: 87.5 kg (193 lb)   Height: 5' 8" (1.727 m)     Physical Exam  Vitals and nursing note reviewed.   Constitutional:       General: She is not in acute distress.  HENT:      Nose: Nose normal.      Mouth/Throat:      Mouth: Mucous membranes are moist.   Eyes:      Pupils: Pupils are equal, round, and reactive to light.   Cardiovascular:      Rate and Rhythm: Normal rate and regular rhythm.      Pulses: Normal pulses.      Heart sounds: Normal heart sounds. No murmur heard.  Pulmonary:      Effort: Pulmonary effort is normal. No respiratory distress.      Breath sounds: Normal breath sounds. No wheezing, rhonchi or rales.   Chest:      Chest wall: No tenderness.   Abdominal:      General: Bowel sounds are normal.      Palpations: Abdomen is soft.      Tenderness: There is no right CVA tenderness or left CVA tenderness.   Musculoskeletal:         General: Normal range of motion.      Cervical back: Normal range of motion and neck supple.      Right lower leg: No edema.      Left lower leg: No edema.   Skin:     General: Skin is warm and dry.   Neurological:      General: No focal deficit present.      Mental Status: She is alert and oriented to person, place, and time.          Assessment and Plan (including Health Maintenance)      Problem List  Smart Sets  Document Outside HM   :    Plan:         Health Maintenance Due   Topic Date Due    Hepatitis C " Screening  Never done    TETANUS VACCINE  Never done    Shingles Vaccine (1 of 2) Never done    COVID-19 Vaccine (1 - 2023-24 season) Never done    Mammogram  12/09/2023    Eye Exam  02/27/2024       Problem List Items Addressed This Visit    None  Visit Diagnoses       Dysuria    -  Primary    Relevant Orders    POCT URINALYSIS W/O SCOPE (Completed)    Urine culture          Dysuria  -     POCT URINALYSIS W/O SCOPE  -     Urine culture       Health Maintenance Topics with due status: Not Due       Topic Last Completion Date    Colorectal Cancer Screening 11/26/2019    Diabetes Urine Screening 04/26/2024    Foot Exam 04/26/2024    Lipid Panel 04/26/2024    Hemoglobin A1c 04/26/2024    Low Dose Statin 05/15/2024         Future Appointments   Date Time Provider Department Center   5/15/2024  4:15 PM Dominique Gross DNP, FNP [facility]   10/25/2024  9:40 AM [facility]        Follow up if symptoms worsen or fail to improve.     Signature:  Dominique Gross DNP, FNP  [address] [place] [address]  Phone #: [phone]  Fax #: [phone]    Date of encounter: 5/15/24    Patient Instructions   Will await culture prior to treatment of antibiotics. Will obtain records from [facility] regarding recent visit for kidney stone.

## 2024-05-15 NOTE — PATIENT INSTRUCTIONS
Will await culture prior to treatment of antibiotics. Will obtain records from [facility] regarding recent visit for kidney stone.

## 2024-05-16 ENCOUNTER — PATIENT OUTREACH (OUTPATIENT)
Dept: ADMINISTRATIVE | Facility: HOSPITAL | Age: 59
End: 2024-05-16

## 2024-05-16 NOTE — PROGRESS NOTES
Population Health Chart Review & Patient Outreach Details    Health Maintenance Topics Addressed and Outreach Outcomes / Actions Taken:  Breast Cancer Screening [x] HM Updated with May 2024 Mammogram ([facility]). History Updated.

## 2024-05-17 DIAGNOSIS — N32.81 OVERACTIVE BLADDER: Primary | ICD-10-CM

## 2024-05-17 LAB
LEFT EYE DM RETINOPATHY: NEGATIVE
RIGHT EYE DM RETINOPATHY: NEGATIVE
UA COMPLETE W REFLEX CULTURE PNL UR: NO GROWTH

## 2024-05-17 RX ORDER — MIRABEGRON 25 MG/1
25 TABLET, FILM COATED, EXTENDED RELEASE ORAL DAILY
Qty: 30 TABLET | Refills: 0 | Status: SHIPPED | OUTPATIENT
Start: 2024-05-17 | End: 2024-05-18

## 2024-05-18 ENCOUNTER — PATIENT MESSAGE (OUTPATIENT)
Dept: FAMILY MEDICINE | Facility: CLINIC | Age: 59
End: 2024-05-18
Payer: COMMERCIAL

## 2024-05-18 DIAGNOSIS — N32.81 OVERACTIVE BLADDER: Primary | ICD-10-CM

## 2024-05-18 RX ORDER — OXYBUTYNIN CHLORIDE 10 MG/1
10 TABLET, EXTENDED RELEASE ORAL DAILY
Qty: 30 TABLET | Refills: 0 | Status: SHIPPED | OUTPATIENT
Start: 2024-05-18 | End: 2024-06-11 | Stop reason: SDUPTHER

## 2024-05-18 NOTE — TELEPHONE ENCOUNTER
Received notification that Oxybutynin is on pt's formulary while Myrbetriq is not.  Pending review of Oxybutynin ER order for provider review.

## 2024-05-20 ENCOUNTER — PATIENT OUTREACH (OUTPATIENT)
Dept: ADMINISTRATIVE | Facility: HOSPITAL | Age: 59
End: 2024-05-20

## 2024-05-20 NOTE — PROGRESS NOTES
Population Health Chart Review & Patient Outreach Details      Health Maintenance Topics Addressed and Outreach Outcomes / Actions Taken:  Diabetic Eye Exam [x] HM Updated with May 2024 Eye Exam (Dr. Lowery). History Updated.

## 2024-05-24 ENCOUNTER — PATIENT MESSAGE (OUTPATIENT)
Dept: PAIN MEDICINE | Facility: CLINIC | Age: 59
End: 2024-05-24
Payer: COMMERCIAL

## 2024-05-29 ENCOUNTER — PATIENT MESSAGE (OUTPATIENT)
Dept: PAIN MEDICINE | Facility: CLINIC | Age: 59
End: 2024-05-29
Payer: COMMERCIAL

## 2024-06-03 ENCOUNTER — OFFICE VISIT (OUTPATIENT)
Dept: PAIN MEDICINE | Facility: CLINIC | Age: 59
End: 2024-06-03
Payer: COMMERCIAL

## 2024-06-03 VITALS
RESPIRATION RATE: 18 BRPM | BODY MASS INDEX: 30.01 KG/M2 | HEIGHT: 68 IN | WEIGHT: 198 LBS | SYSTOLIC BLOOD PRESSURE: 142 MMHG | DIASTOLIC BLOOD PRESSURE: 73 MMHG | HEART RATE: 70 BPM

## 2024-06-03 DIAGNOSIS — Z79.899 ENCOUNTER FOR LONG-TERM (CURRENT) USE OF OTHER MEDICATIONS: ICD-10-CM

## 2024-06-03 DIAGNOSIS — M54.16 LUMBAR RADICULOPATHY: Primary | Chronic | ICD-10-CM

## 2024-06-03 DIAGNOSIS — M46.1 SACROILIITIS: Chronic | ICD-10-CM

## 2024-06-03 LAB
CTP QC/QA: YES
POC (AMP) AMPHETAMINE: NEGATIVE
POC (BAR) BARBITURATES: NEGATIVE
POC (BUP) BUPRENORPHINE: NEGATIVE
POC (BZO) BENZODIAZEPINES: NEGATIVE
POC (COC) COCAINE: NEGATIVE
POC (MDMA) METHYLENEDIOXYMETHAMPHETAMINE 3,4: NEGATIVE
POC (MET) METHAMPHETAMINE: NEGATIVE
POC (MOP) OPIATES: NEGATIVE
POC (MTD) METHADONE: NEGATIVE
POC (OXY) OXYCODONE: NEGATIVE
POC (PCP) PHENCYCLIDINE: NEGATIVE
POC (TCA) TRICYCLIC ANTIDEPRESSANTS: NEGATIVE
POC TEMPERATURE (URINE): 90
POC THC: NEGATIVE

## 2024-06-03 PROCEDURE — 80305 DRUG TEST PRSMV DIR OPT OBS: CPT | Mod: PBBFAC | Performed by: PHYSICIAN ASSISTANT

## 2024-06-03 PROCEDURE — 99999PBSHW POCT URINE DRUG SCREEN PRESUMP: Mod: PBBFAC,,,

## 2024-06-03 PROCEDURE — 99214 OFFICE O/P EST MOD 30 MIN: CPT | Mod: S$PBB,,, | Performed by: PHYSICIAN ASSISTANT

## 2024-06-03 PROCEDURE — 99214 OFFICE O/P EST MOD 30 MIN: CPT | Mod: PBBFAC | Performed by: PHYSICIAN ASSISTANT

## 2024-06-03 RX ORDER — HYDROCODONE BITARTRATE AND ACETAMINOPHEN 7.5; 325 MG/1; MG/1
1 TABLET ORAL EVERY 12 HOURS PRN
Qty: 60 TABLET | Refills: 0 | Status: SHIPPED | OUTPATIENT
Start: 2024-06-03

## 2024-06-03 NOTE — PROGRESS NOTES
Subjective:         Patient ID: Ene Clemons is a 58 y.o. female.    Chief Complaint: Tailbone Pain and Hip Pain      Pain  This is a chronic problem. The current episode started more than 1 year ago. The problem has been waxing and waning. Associated symptoms include arthralgias. Pertinent negatives include no anorexia, change in bowel habit, chest pain, chills, coughing, diaphoresis, fever, neck pain, rash, sore throat, swollen glands, urinary symptoms, vertigo or vomiting.     Review of Systems   Constitutional:  Negative for activity change, appetite change, chills, diaphoresis, fever and unexpected weight change.   HENT:  Negative for drooling, ear discharge, ear pain, facial swelling, nosebleeds, sore throat, trouble swallowing, voice change and goiter.    Eyes:  Negative for photophobia, pain, discharge, redness and visual disturbance.   Respiratory:  Negative for apnea, cough, choking, chest tightness, shortness of breath, wheezing and stridor.    Cardiovascular:  Negative for chest pain, palpitations and leg swelling.   Gastrointestinal:  Negative for abdominal distention, anorexia, change in bowel habit, diarrhea, rectal pain, vomiting and fecal incontinence.   Endocrine: Negative for cold intolerance, heat intolerance, polydipsia, polyphagia and polyuria.   Genitourinary:  Negative for bladder incontinence, dysuria, flank pain, frequency and hot flashes.   Musculoskeletal:  Positive for arthralgias, back pain and leg pain. Negative for neck pain.   Integumentary:  Negative for color change, pallor and rash.   Allergic/Immunologic: Negative for immunocompromised state.   Neurological:  Negative for dizziness, vertigo, seizures, syncope, facial asymmetry, speech difficulty, light-headedness, memory loss and coordination difficulties.   Hematological:  Negative for adenopathy. Does not bruise/bleed easily.   Psychiatric/Behavioral:  Negative for agitation, behavioral problems, confusion, decreased  concentration, dysphoric mood, hallucinations, self-injury and suicidal ideas. The patient is not nervous/anxious and is not hyperactive.            Past Medical History:   Diagnosis Date    Arthritis     Breast cancer screening by mammogram 05/15/2024    [facility]    Depression     Diabetic eye exam 2023    Dr. Dueñas [facility]    Diabetic eye exam 2024    Dr. Dueñas [facility]    Hyperlipidemia     Hypothyroidism     Thyroid disease      Past Surgical History:   Procedure Laterality Date    ADENOIDECTOMY      CHOLECYSTECTOMY      HYSTERECTOMY      RADIOFREQUENCY THERMOCOAGULATION Bilateral 3/26/2024    Procedure: RADIOFREQUENCY THERMAL COAGULATION SI;  Surgeon: Radhika Neff MD;  Location: [facility];  Service: Pain Management;  Laterality: Bilateral;    TONSILLECTOMY      TOTAL THYROIDECTOMY       Social History     Socioeconomic History    Marital status:      Spouse name: Hector    Number of children: 0   Occupational History    Occupation: retired   Tobacco Use    Smoking status: Former     Current packs/day: 0.00     Average packs/day: 0.5 packs/day for 39.0 years (19.5 ttl pk-yrs)     Types: Cigarettes, Vaping with nicotine     Start date:      Quit date:      Years since quittin.4     Passive exposure: Past    Smokeless tobacco: Never    Tobacco comments:     Quit smoking last 2923   Substance and Sexual Activity    Alcohol use: Not Currently    Drug use: Never    Sexual activity: Yes     Partners: Male     Social Determinants of Health     Financial Resource Strain: High Risk (2024)    Overall Financial Resource Strain (CARDIA)     Difficulty of Paying Living Expenses: Hard   Food Insecurity: No Food Insecurity (2024)    Hunger Vital Sign     Worried About Running Out of Food in the Last Year: Never true     Ran Out of Food in the Last Year: Never true   Transportation Needs: No Transportation Needs  "(4/23/2024)    PRAPARE - Transportation     Lack of Transportation (Medical): No     Lack of Transportation (Non-Medical): No   Physical Activity: Insufficiently Active (4/23/2024)    Exercise Vital Sign     Days of Exercise per Week: 2 days     Minutes of Exercise per Session: 20 min   Stress: Stress Concern Present (4/23/2024)    [employer] [place] [employer] - Occupational Stress Questionnaire     Feeling of Stress : To some extent   Housing Stability: Low Risk  (1/28/2022)    Housing Stability Vital Sign     Unable to Pay for Housing in the Last Year: No     Number of Places Lived in the Last Year: 0     Unstable Housing in the Last Year: No     Family History   Problem Relation Name Age of Onset    No Known Problems Mother      Arthritis Father      Heart disease Father      Cancer Father      No Known Problems Sister       Review of patient's allergies indicates:   Allergen Reactions    Doxycycline     Sulfa (sulfonamide antibiotics)         Objective:  Vitals:    06/03/24 0819   BP: (!) 142/73   Pulse: 70   Resp: 18   Weight: 89.8 kg (198 lb)   Height: 5' 8" (1.727 m)   PainSc:   7             Physical Exam  Vitals and nursing note reviewed. Exam conducted with a chaperone present.   Constitutional:       General: She is awake. She is not in acute distress.     Appearance: Normal appearance. She is not ill-appearing, toxic-appearing or diaphoretic.   HENT:      Head: Normocephalic and atraumatic.      Nose: Nose normal.      Mouth/Throat:      Mouth: Mucous membranes are moist.      Pharynx: Oropharynx is clear.   Eyes:      Conjunctiva/sclera: Conjunctivae normal.      Pupils: Pupils are equal, round, and reactive to light.   Cardiovascular:      Rate and Rhythm: Normal rate.   Pulmonary:      Effort: Pulmonary effort is normal. No respiratory distress.   Abdominal:      Palpations: Abdomen is soft.      Tenderness: There is no guarding.   Musculoskeletal:         General: Normal range of motion. "      Cervical back: Normal range of motion and neck supple. No rigidity.   Skin:     General: Skin is warm and dry.      Coloration: Skin is not jaundiced or pale.   Neurological:      General: No focal deficit present.      Mental Status: She is alert and oriented to person, place, and time. Mental status is at baseline.      Cranial Nerves: No cranial nerve deficit (II-XII).   Psychiatric:         Mood and Affect: Mood normal.         Behavior: Behavior normal. Behavior is cooperative.         Thought Content: Thought content normal.           FL Fluoro for Pain Management  See OP Notes for results.     IMPRESSION: See OP Notes for results.     This procedure was auto-finalized by: Virtual Radiologist       Patient Outreach on 05/20/2024   Component Date Value Ref Range Status    Left Eye DM Retinopathy 05/17/2024 Negative   Final    Right Eye DM Retinopathy 05/17/2024 Negative   Final   Patient Outreach on 05/16/2024   Component Date Value Ref Range Status    BCS Recommendation External 05/15/2024 Repeat mammogram in 1 year   Final   Office Visit on 05/15/2024   Component Date Value Ref Range Status    Color, UA 05/15/2024 Yellow   Final    Spec Grav UA 05/15/2024 1.020   Final    pH, UA 05/15/2024 5.5   Final    WBC, UA 05/15/2024 neg   Final    Nitrite, UA 05/15/2024 pos   Final    Protein, POC 05/15/2024 neg   Final    Glucose, UA 05/15/2024 neg   Final    Ketones, UA 05/15/2024 neg   Final    Bilirubin, POC 05/15/2024 neg   Final    Urobilinogen, UA 05/15/2024 0.2   Final    Blood, UA 05/15/2024 neg   Final    Culture, Urine 05/15/2024 No Growth   Final   Office Visit on 04/26/2024   Component Date Value Ref Range Status    Color, UA 04/26/2024 Yellow   Final    Spec Grav UA 04/26/2024 1.030   Final    pH, UA 04/26/2024 6.0   Final    WBC, UA 04/26/2024 lg   Final    Nitrite, UA 04/26/2024 pos   Final    Protein, POC 04/26/2024 300   Final    Glucose, UA 04/26/2024 neg   Final    Ketones, UA 04/26/2024 neg    Final    Bilirubin, POC 04/26/2024 sm   Final    Urobilinogen, UA 04/26/2024 0.2   Final    Blood, UA 04/26/2024 lg   Final    Creatinine, Urine 04/26/2024 144  28 - 219 mg/dL Final    Microalbumin 04/26/2024 72.3 (H)  0.0 - 2.8 mg/dL Final    Microalbumin/Creatinine Ratio 04/26/2024 502.1 (H)  0.0 - 30.0 mg/g Final    Culture, Urine 04/26/2024 >100,000 Escherichia coli (A)   Final    Hemoglobin A1C 04/26/2024 5.5  4.5 - 6.6 % Final    Estimated Average Glucose 04/26/2024 111  mg/dL Final    Sodium 04/26/2024 141  136 - 145 mmol/L Final    Potassium 04/26/2024 4.0  3.5 - 5.1 mmol/L Final    Chloride 04/26/2024 109 (H)  98 - 107 mmol/L Final    CO2 04/26/2024 23  21 - 32 mmol/L Final    Anion Gap 04/26/2024 13  7 - 16 mmol/L Final    Glucose 04/26/2024 96  74 - 106 mg/dL Final    BUN 04/26/2024 16  7 - 18 mg/dL Final    Creatinine 04/26/2024 0.66  0.55 - 1.02 mg/dL Final    BUN/Creatinine Ratio 04/26/2024 24 (H)  6 - 20 Final    Calcium 04/26/2024 9.4  8.5 - 10.1 mg/dL Final    Total Protein 04/26/2024 7.6  6.4 - 8.2 g/dL Final    Albumin 04/26/2024 3.7  3.5 - 5.0 g/dL Final    Globulin 04/26/2024 3.9  2.0 - 4.0 g/dL Final    A/G Ratio 04/26/2024 0.9   Final    Bilirubin, Total 04/26/2024 0.4  >0.0 - 1.2 mg/dL Final    Alk Phos 04/26/2024 122 (H)  46 - 118 U/L Final    ALT 04/26/2024 24  13 - 56 U/L Final    AST 04/26/2024 27  15 - 37 U/L Final    eGFR 04/26/2024 101  >=60 mL/min/1.73m2 Final    Triglycerides 04/26/2024 282 (H)  35 - 150 mg/dL Final    Cholesterol 04/26/2024 162  0 - 200 mg/dL Final    HDL Cholesterol 04/26/2024 45  40 - 60 mg/dL Final    Cholesterol/HDL Ratio (Risk Factor) 04/26/2024 3.6   Final    Non-HDL 04/26/2024 117  mg/dL Final    LDL Calculated 04/26/2024 61  mg/dL Final    LDL/HDL 04/26/2024 1.4   Final    VLDL 04/26/2024 56  mg/dL Final    TSH 04/26/2024 0.276 (L)  0.358 - 3.740 uIU/mL Final    WBC 04/26/2024 14.71 (H)  4.50 - 11.00 K/uL Final    RBC 04/26/2024 4.64  4.20 - 5.40 M/uL Final     Hemoglobin 04/26/2024 13.0  12.0 - 16.0 g/dL Final    Hematocrit 04/26/2024 40.4  38.0 - 47.0 % Final    MCV 04/26/2024 87.1  80.0 - 96.0 fL Final    MCH 04/26/2024 28.0  27.0 - 31.0 pg Final    MCHC 04/26/2024 32.2  32.0 - 36.0 g/dL Final    RDW 04/26/2024 12.8  11.5 - 14.5 % Final    Platelet Count 04/26/2024 256  150 - 400 K/uL Final    MPV 04/26/2024 10.2  9.4 - 12.4 fL Final    Neutrophils % 04/26/2024 78.6 (H)  53.0 - 65.0 % Final    Lymphocytes % 04/26/2024 12.0 (L)  27.0 - 41.0 % Final    Monocytes % 04/26/2024 6.1 (H)  2.0 - 6.0 % Final    Eosinophils % 04/26/2024 2.0  1.0 - 4.0 % Final    Basophils % 04/26/2024 0.5  0.0 - 1.0 % Final    Immature Granulocytes % 04/26/2024 0.8 (H)  0.0 - 0.4 % Final    nRBC, Auto 04/26/2024 0.0  <=0.0 % Final    Neutrophils, Abs 04/26/2024 11.56 (H)  1.80 - 7.70 K/uL Final    Lymphocytes, Absolute 04/26/2024 1.77  1.00 - 4.80 K/uL Final    Monocytes, Absolute 04/26/2024 0.89 (H)  0.00 - 0.80 K/uL Final    Eosinophils, Absolute 04/26/2024 0.30  0.00 - 0.50 K/uL Final    Basophils, Absolute 04/26/2024 0.07  0.00 - 0.20 K/uL Final    Immature Granulocytes, Absolute 04/26/2024 0.12 (H)  0.00 - 0.04 K/uL Final    nRBC, Absolute 04/26/2024 0.00  <=0.00 x10e3/uL Final    Diff Type 04/26/2024 Auto   Final   Office Visit on 12/06/2023   Component Date Value Ref Range Status    POC Amphetamines 12/06/2023 Negative  Negative, Inconclusive Final    POC Barbiturates 12/06/2023 Negative  Negative, Inconclusive Final    POC Benzodiazepines 12/06/2023 Presumptive Positive (A)  Negative, Inconclusive Final    POC Cocaine 12/06/2023 Negative  Negative, Inconclusive Final    POC THC 12/06/2023 Negative  Negative, Inconclusive Final    POC Methadone 12/06/2023 Negative  Negative, Inconclusive Final    POC Methamphetamine 12/06/2023 Negative  Negative, Inconclusive Final    POC Opiates 12/06/2023 Negative  Negative, Inconclusive Final    POC Oxycodone 12/06/2023 Negative  Negative,  Inconclusive Final    POC Phencyclidine 12/06/2023 Negative  Negative, Inconclusive Final    POC Methylenedioxymethamphetamine * 12/06/2023 Negative  Negative, Inconclusive Final    POC Tricyclic Antidepressants 12/06/2023 Negative  Negative, Inconclusive Final    POC Buprenorphine 12/06/2023 Negative   Final     Acceptable 12/06/2023 Yes   Final    POC Temperature (Urine) 12/06/2023 94   Final    [employer] Generic Orderable 12/06/2023 SEE COMMENTS   Final         Orders Placed This Encounter   Procedures    POCT Urine Drug Screen Presump     Interpretive Information:     Negative:  No drug detected at the cut off level.   Positive:  This result represents presumptive positive for the   tested drug, other substances may yield a positive response other   than the analyte of interest. This result should be utilized for   diagnostic purpose only. Confirmation testing will be performed upon physician request only.          Requested Prescriptions     Signed Prescriptions Disp Refills    HYDROcodone-acetaminophen (NORCO) 7.5-325 mg per tablet 60 tablet 0     Sig: Take 1 tablet by mouth every 12 (twelve) hours as needed for Pain.       Assessment:     1. Lumbar radiculopathy    2. Sacroiliitis    3. Encounter for long-term (current) use of other medications                   Plan:      Saw patient April 10, 2024        Not using narcotics on a daily basis, only p.r.n.        Physical therapy notes [facility] February 2024    She had bilateral sacroiliac RF TC March 26, 2024, patient had 80% relief after procedure, the procedure did help improve her level of function     Consider bilateral hip injections for bilateral hip pain and L3-4 epidural steroid injection for lumbar radiculopathy    X-ray lumbar spine [facility] December 6, 2023, no fracture noted multiple level degenerative changes mild anterior listhesis L4/5    X-ray bilateral hips /sacroiliac joint [facility] December 6, 2023 no fracture noted  mild-to-moderate degenerative changes sacroiliac joint bilateral hips      Complaint of numbness tailbone area after sacroiliac RF TC    Discuss the natural course of recovery after RF TC    No MRI lumbar spine for review    She would like refill hydrocodone     Is not taking Cymbalta daily basis    She states the gabapentin does help    Recommend she resume Cymbalta minimum 14 days to see if this helps    Otherwise she would like to continue conservative management    Continue Cymbalta, gabapentin on a daily basis     Will continue home exercise program as directed     Follow-up 6 months, patient request    Dr. Neff March 2025    Bring original prescription medication bottles/container/box with labels to each visit

## 2024-06-10 ENCOUNTER — PATIENT MESSAGE (OUTPATIENT)
Dept: PAIN MEDICINE | Facility: CLINIC | Age: 59
End: 2024-06-10
Payer: COMMERCIAL

## 2024-06-11 ENCOUNTER — PATIENT MESSAGE (OUTPATIENT)
Dept: FAMILY MEDICINE | Facility: CLINIC | Age: 59
End: 2024-06-11
Payer: COMMERCIAL

## 2024-06-11 DIAGNOSIS — N32.81 OVERACTIVE BLADDER: ICD-10-CM

## 2024-06-11 RX ORDER — OXYBUTYNIN CHLORIDE 10 MG/1
10 TABLET, EXTENDED RELEASE ORAL DAILY
Qty: 90 TABLET | Refills: 1 | Status: SHIPPED | OUTPATIENT
Start: 2024-06-11 | End: 2025-06-11

## 2024-07-09 ENCOUNTER — PATIENT MESSAGE (OUTPATIENT)
Dept: FAMILY MEDICINE | Facility: CLINIC | Age: 59
End: 2024-07-09
Payer: COMMERCIAL

## 2024-07-09 ENCOUNTER — PATIENT MESSAGE (OUTPATIENT)
Dept: PAIN MEDICINE | Facility: CLINIC | Age: 59
End: 2024-07-09
Payer: COMMERCIAL

## 2024-07-09 DIAGNOSIS — E03.9 ACQUIRED HYPOTHYROIDISM: Primary | ICD-10-CM

## 2024-07-09 RX ORDER — LEVOTHYROXINE SODIUM 100 UG/1
100 TABLET ORAL
Qty: 90 TABLET | Refills: 0 | Status: SHIPPED | OUTPATIENT
Start: 2024-07-09 | End: 2025-07-09

## 2024-09-16 DIAGNOSIS — F33.9 RECURRENT MAJOR DEPRESSIVE DISORDER, REMISSION STATUS UNSPECIFIED: ICD-10-CM

## 2024-09-18 RX ORDER — CITALOPRAM 40 MG/1
40 TABLET, FILM COATED ORAL DAILY
Qty: 90 TABLET | Refills: 1 | Status: SHIPPED | OUTPATIENT
Start: 2024-09-18

## 2024-09-25 DIAGNOSIS — F33.9 RECURRENT MAJOR DEPRESSIVE DISORDER, REMISSION STATUS UNSPECIFIED: ICD-10-CM

## 2024-09-25 NOTE — TELEPHONE ENCOUNTER
Called and t/t [facility] Pharmacist Danila and gave verbal okay to renew this prescription so she will be able to get in [place] while she is with her mother.

## 2024-09-29 RX ORDER — CITALOPRAM 40 MG/1
40 TABLET, FILM COATED ORAL
Qty: 90 TABLET | Refills: 1 | Status: SHIPPED | OUTPATIENT
Start: 2024-09-29

## 2024-10-14 DIAGNOSIS — M54.16 LUMBAR RADICULOPATHY: Chronic | ICD-10-CM

## 2024-10-14 DIAGNOSIS — M46.1 SACROILIITIS: Chronic | ICD-10-CM

## 2024-10-14 RX ORDER — GABAPENTIN 300 MG/1
CAPSULE ORAL
Qty: 540 CAPSULE | Refills: 1 | OUTPATIENT
Start: 2024-10-14

## 2024-11-25 DIAGNOSIS — N32.81 OVERACTIVE BLADDER: ICD-10-CM

## 2024-11-25 RX ORDER — OXYBUTYNIN CHLORIDE 10 MG/1
10 TABLET, EXTENDED RELEASE ORAL
Qty: 90 TABLET | Refills: 1 | Status: SHIPPED | OUTPATIENT
Start: 2024-11-25

## 2024-12-09 RX ORDER — LEVOTHYROXINE SODIUM 112 UG/1
112 TABLET ORAL
COMMUNITY
Start: 2024-09-27 | End: 2024-12-10 | Stop reason: CLARIF

## 2024-12-10 ENCOUNTER — OFFICE VISIT (OUTPATIENT)
Dept: FAMILY MEDICINE | Facility: CLINIC | Age: 59
End: 2024-12-10
Payer: COMMERCIAL

## 2024-12-10 VITALS
RESPIRATION RATE: 18 BRPM | HEIGHT: 68 IN | OXYGEN SATURATION: 96 % | DIASTOLIC BLOOD PRESSURE: 79 MMHG | TEMPERATURE: 99 F | SYSTOLIC BLOOD PRESSURE: 138 MMHG | WEIGHT: 186 LBS | BODY MASS INDEX: 28.19 KG/M2 | HEART RATE: 69 BPM

## 2024-12-10 DIAGNOSIS — N32.81 OVERACTIVE BLADDER: ICD-10-CM

## 2024-12-10 DIAGNOSIS — M54.16 LUMBAR RADICULOPATHY: ICD-10-CM

## 2024-12-10 DIAGNOSIS — E78.2 MIXED HYPERLIPIDEMIA: ICD-10-CM

## 2024-12-10 DIAGNOSIS — J06.9 UPPER RESPIRATORY TRACT INFECTION, UNSPECIFIED TYPE: ICD-10-CM

## 2024-12-10 DIAGNOSIS — M46.1 SACROILIITIS: Chronic | ICD-10-CM

## 2024-12-10 DIAGNOSIS — E11.9 TYPE 2 DIABETES MELLITUS WITHOUT COMPLICATION, WITH LONG-TERM CURRENT USE OF INSULIN: Primary | Chronic | ICD-10-CM

## 2024-12-10 DIAGNOSIS — E03.9 ACQUIRED HYPOTHYROIDISM: Chronic | ICD-10-CM

## 2024-12-10 DIAGNOSIS — Z79.4 TYPE 2 DIABETES MELLITUS WITHOUT COMPLICATION, WITH LONG-TERM CURRENT USE OF INSULIN: Primary | Chronic | ICD-10-CM

## 2024-12-10 DIAGNOSIS — F33.9 RECURRENT MAJOR DEPRESSIVE DISORDER, REMISSION STATUS UNSPECIFIED: ICD-10-CM

## 2024-12-10 DIAGNOSIS — K21.9 GASTROESOPHAGEAL REFLUX DISEASE, UNSPECIFIED WHETHER ESOPHAGITIS PRESENT: ICD-10-CM

## 2024-12-10 LAB
EST. AVERAGE GLUCOSE BLD GHB EST-MCNC: 114 MG/DL
HBA1C MFR BLD HPLC: 5.6 %

## 2024-12-10 PROCEDURE — 83036 HEMOGLOBIN GLYCOSYLATED A1C: CPT | Mod: ,,, | Performed by: CLINICAL MEDICAL LABORATORY

## 2024-12-10 PROCEDURE — 99214 OFFICE O/P EST MOD 30 MIN: CPT | Mod: ,,, | Performed by: FAMILY MEDICINE

## 2024-12-10 RX ORDER — OXYBUTYNIN CHLORIDE 10 MG/1
10 TABLET, EXTENDED RELEASE ORAL DAILY
Qty: 90 TABLET | Refills: 1 | Status: SHIPPED | OUTPATIENT
Start: 2024-12-10

## 2024-12-10 RX ORDER — LEVOTHYROXINE SODIUM 100 UG/1
100 TABLET ORAL
Qty: 90 TABLET | Refills: 1 | Status: SHIPPED | OUTPATIENT
Start: 2024-12-10

## 2024-12-10 RX ORDER — LEVOTHYROXINE SODIUM 100 UG/1
100 TABLET ORAL
COMMUNITY
End: 2024-12-10

## 2024-12-10 RX ORDER — SIMVASTATIN 10 MG/1
10 TABLET, FILM COATED ORAL NIGHTLY
Qty: 90 TABLET | Refills: 1 | Status: SHIPPED | OUTPATIENT
Start: 2024-12-10 | End: 2025-12-10

## 2024-12-10 RX ORDER — CELECOXIB 200 MG/1
200 CAPSULE ORAL DAILY
Qty: 90 CAPSULE | Refills: 1 | Status: SHIPPED | OUTPATIENT
Start: 2024-12-10

## 2024-12-10 RX ORDER — CITALOPRAM 40 MG/1
40 TABLET, FILM COATED ORAL DAILY
Qty: 90 TABLET | Refills: 1 | Status: SHIPPED | OUTPATIENT
Start: 2024-12-10

## 2024-12-10 RX ORDER — GABAPENTIN 300 MG/1
600 CAPSULE ORAL 3 TIMES DAILY
Qty: 270 CAPSULE | Refills: 1 | Status: SHIPPED | OUTPATIENT
Start: 2024-12-10 | End: 2025-03-10

## 2024-12-10 RX ORDER — OMEPRAZOLE 40 MG/1
40 CAPSULE, DELAYED RELEASE ORAL DAILY
Qty: 90 CAPSULE | Refills: 1 | Status: SHIPPED | OUTPATIENT
Start: 2024-12-10 | End: 2025-06-08

## 2024-12-10 NOTE — PROGRESS NOTES
"New Clinic Note    Ene Clemons is a 59 y.o. female     CC:   Chief Complaint   Patient presents with    Anxiety     Patient is here for 6 month check up, recheck her elevated blood pressure and recheck her A1C. Wants refills sent to [facility] Mail order. She is "fasting" for labs. Stated we sent in Synthroid 100 mcg but it was changed some how to Euthyrox .     Gastroesophageal Reflux    Hyperlipidemia    Diabetes    Medication Refill    Sinusitis     Complains of 6 days of sinus drainage and a cough. Took OTC Night Quil. Stated she is having productive cough. She vapes.     Cough        Subjective    History of Present Illness HPI   Patient is for evaluation of chronic medical problems. Patient needs refills. Ene  is tolerating medications well without side effects.   Patient complains of cough and congestion for 6 days. She has tried OTC Dayquil and Nyquil with relief. She says she is feeling better today. She complains of sore throat.     Current Outpatient Medications:     acyclovir (ZOVIRAX) 400 MG tablet, Take 1 tablet (400 mg total) by mouth 2 (two) times daily., Disp: 60 tablet, Rfl: 11    HYDROcodone-acetaminophen (NORCO) 7.5-325 mg per tablet, Take 1 tablet by mouth every 12 (twelve) hours as needed for Pain., Disp: 60 tablet, Rfl: 0    metFORMIN (GLUCOPHAGE) 1000 MG tablet, Take 1 tablet (1,000 mg total) by mouth 2 (two) times daily., Disp: 180 tablet, Rfl: 1    celecoxib (CELEBREX) 200 MG capsule, Take 1 capsule (200 mg total) by mouth once daily., Disp: 90 capsule, Rfl: 1    citalopram (CELEXA) 40 MG tablet, Take 1 tablet (40 mg total) by mouth once daily., Disp: 90 tablet, Rfl: 1    gabapentin (NEURONTIN) 300 MG capsule, Take 2 capsules (600 mg total) by mouth 3 (three) times daily., Disp: 270 capsule, Rfl: 1    levothyroxine (SYNTHROID) 100 MCG tablet, Take 1 tablet (100 mcg total) by mouth before breakfast., Disp: 90 tablet, Rfl: 1    omeprazole (PRILOSEC) 40 MG capsule, Take 1 capsule (40 mg total) " by mouth once daily., Disp: 90 capsule, Rfl: 1    oxybutynin (DITROPAN-XL) 10 MG 24 hr tablet, Take 1 tablet (10 mg total) by mouth once daily., Disp: 90 tablet, Rfl: 1    simvastatin (ZOCOR) 10 MG tablet, Take 1 tablet (10 mg total) by mouth every evening., Disp: 90 tablet, Rfl: 1     Past Medical History:   Diagnosis Date    Arthritis     Breast cancer screening by mammogram 05/15/2024    [facility]    Depression     Diabetic eye exam 02/27/2023    Dr. Due[facility]as [facility]    Diabetic eye exam 05/17/2024    Dr. Dueñas [facility]    Hyperlipidemia     Hypothyroidism     Thyroid disease         Family History   Problem Relation Name Age of Onset    No Known Problems Mother      Arthritis Father      Heart disease Father      Cancer Father      No Known Problems Sister          Past Surgical History:   Procedure Laterality Date    ADENOIDECTOMY      CHOLECYSTECTOMY      HYSTERECTOMY      RADIOFREQUENCY THERMOCOAGULATION Bilateral 3/26/2024    Procedure: RADIOFREQUENCY THERMAL COAGULATION SI;  Surgeon: Radhika Neff MD;  Location: [facility];  Service: Pain Management;  Laterality: Bilateral;    TONSILLECTOMY      TOTAL THYROIDECTOMY          Review of Systems   Constitutional:  Negative for activity change and unexpected weight change.   HENT:  Positive for postnasal drip, rhinorrhea and sore throat. Negative for hearing loss and trouble swallowing.    Eyes:  Negative for discharge and visual disturbance.   Respiratory:  Positive for cough. Negative for chest tightness and wheezing.    Cardiovascular:  Negative for chest pain and palpitations.   Gastrointestinal:  Negative for blood in stool, constipation, diarrhea and vomiting.   Endocrine: Negative for polydipsia and polyuria.   Genitourinary:  Negative for difficulty urinating, dysuria, hematuria and menstrual problem.   Musculoskeletal:  Negative for arthralgias, joint swelling and neck pain.   Neurological:  Negative  "for weakness and headaches.   Psychiatric/Behavioral:  Negative for confusion and dysphoric mood.         /79 (BP Location: Left arm, Patient Position: Sitting)   Pulse 69   Temp 98.9 °F (37.2 °C) (Oral)   Resp 18   Ht 5' 8" (1.727 m)   Wt 84.4 kg (186 lb)   SpO2 96%   BMI 28.28 kg/m²      Physical Exam  HENT:      Head: Normocephalic and atraumatic.      Nose: Rhinorrhea present.   Cardiovascular:      Rate and Rhythm: Normal rate and regular rhythm.   Pulmonary:      Effort: Pulmonary effort is normal.      Breath sounds: Normal breath sounds.   Neurological:      Mental Status: She is alert and oriented to person, place, and time.   Psychiatric:         Mood and Affect: Mood normal.         Behavior: Behavior normal.          Assessment and Plan      ICD-10-CM ICD-9-CM   1. Type 2 diabetes mellitus without complication, with long-term current use of insulin  E11.9 250.00    Z79.4 V58.67   2. Recurrent major depressive disorder, remission status unspecified  F33.9 296.30   3. Lumbar radiculopathy  M54.16 724.4   4. Sacroiliitis  M46.1 720.2   5. Gastroesophageal reflux disease, unspecified whether esophagitis present  K21.9 530.81   6. Overactive bladder  N32.81 596.51   7. Mixed hyperlipidemia  E78.2 272.2   8. Acquired hypothyroidism  E03.9 244.9   9. Upper respiratory tract infection, unspecified type  J06.9 465.9        1. Type 2 diabetes mellitus without complication, with long-term current use of insulin  The current medical regimen is effective;  continue present plan and medications.  -     Hemoglobin A1C; Future; Expected date: 12/10/2024    2. Recurrent major depressive disorder, remission status unspecified  The current medical regimen is effective;  continue present plan and medications.  -     citalopram (CELEXA) 40 MG tablet; Take 1 tablet (40 mg total) by mouth once daily.  Dispense: 90 tablet; Refill: 1    3. Lumbar radiculopathy  The current medical regimen is effective;  continue " present plan and medications.  -     celecoxib (CELEBREX) 200 MG capsule; Take 1 capsule (200 mg total) by mouth once daily.  Dispense: 90 capsule; Refill: 1  -     gabapentin (NEURONTIN) 300 MG capsule; Take 2 capsules (600 mg total) by mouth 3 (three) times daily.  Dispense: 270 capsule; Refill: 1    4. Sacroiliitis  The current medical regimen is effective;  continue present plan and medications.  -     gabapentin (NEURONTIN) 300 MG capsule; Take 2 capsules (600 mg total) by mouth 3 (three) times daily.  Dispense: 270 capsule; Refill: 1    5. Gastroesophageal reflux disease, unspecified whether esophagitis present  The current medical regimen is effective;  continue present plan and medications.  -     omeprazole (PRILOSEC) 40 MG capsule; Take 1 capsule (40 mg total) by mouth once daily.  Dispense: 90 capsule; Refill: 1    6. Overactive bladder  The current medical regimen is effective;  continue present plan and medications.  -     oxybutynin (DITROPAN-XL) 10 MG 24 hr tablet; Take 1 tablet (10 mg total) by mouth once daily.  Dispense: 90 tablet; Refill: 1    7. Mixed hyperlipidemia  The current medical regimen is effective;  continue present plan and medications.  -     simvastatin (ZOCOR) 10 MG tablet; Take 1 tablet (10 mg total) by mouth every evening.  Dispense: 90 tablet; Refill: 1    8. Acquired hypothyroidism  The current medical regimen is effective;  continue present plan and medications.  -     levothyroxine (SYNTHROID) 100 MCG tablet; Take 1 tablet (100 mcg total) by mouth before breakfast.  Dispense: 90 tablet; Refill: 1    9. Upper respiratory tract infection, unspecified type  Patient will continue OTC meds. She will let us know if she does not improve.         Follow up in about 6 months (around 6/10/2025).

## 2024-12-20 DIAGNOSIS — E03.9 ACQUIRED HYPOTHYROIDISM: ICD-10-CM

## 2025-01-10 RX ORDER — LEVOTHYROXINE SODIUM 112 UG/1
112 TABLET ORAL
Qty: 90 TABLET | Refills: 1 | OUTPATIENT
Start: 2025-01-10

## 2025-03-05 ENCOUNTER — OFFICE VISIT (OUTPATIENT)
Dept: PAIN MEDICINE | Facility: CLINIC | Age: 60
End: 2025-03-05
Payer: COMMERCIAL

## 2025-03-05 VITALS
RESPIRATION RATE: 18 BRPM | DIASTOLIC BLOOD PRESSURE: 71 MMHG | BODY MASS INDEX: 29.4 KG/M2 | HEART RATE: 72 BPM | HEIGHT: 68 IN | WEIGHT: 194 LBS | SYSTOLIC BLOOD PRESSURE: 113 MMHG

## 2025-03-05 DIAGNOSIS — M54.16 LUMBAR RADICULOPATHY: Chronic | ICD-10-CM

## 2025-03-05 DIAGNOSIS — M46.1 SACROILIITIS: Primary | Chronic | ICD-10-CM

## 2025-03-05 PROCEDURE — 99214 OFFICE O/P EST MOD 30 MIN: CPT | Mod: S$PBB,,, | Performed by: PHYSICIAN ASSISTANT

## 2025-03-05 PROCEDURE — 99214 OFFICE O/P EST MOD 30 MIN: CPT | Mod: PBBFAC | Performed by: PHYSICIAN ASSISTANT

## 2025-03-05 PROCEDURE — 99999 PR PBB SHADOW E&M-EST. PATIENT-LVL IV: CPT | Mod: PBBFAC,,, | Performed by: PHYSICIAN ASSISTANT

## 2025-03-05 RX ORDER — CELECOXIB 200 MG/1
200 CAPSULE ORAL DAILY
Qty: 90 CAPSULE | Refills: 1 | Status: SHIPPED | OUTPATIENT
Start: 2025-03-05

## 2025-03-05 RX ORDER — HYDROCODONE BITARTRATE AND ACETAMINOPHEN 7.5; 325 MG/1; MG/1
1 TABLET ORAL EVERY 12 HOURS PRN
Qty: 60 TABLET | Refills: 0 | Status: SHIPPED | OUTPATIENT
Start: 2025-03-05

## 2025-03-05 RX ORDER — GABAPENTIN 300 MG/1
600 CAPSULE ORAL 3 TIMES DAILY
Qty: 270 CAPSULE | Refills: 1 | Status: SHIPPED | OUTPATIENT
Start: 2025-03-05 | End: 2025-06-03

## 2025-03-05 RX ORDER — METHOCARBAMOL 500 MG/1
500 TABLET, FILM COATED ORAL 3 TIMES DAILY PRN
Qty: 90 TABLET | Refills: 1 | Status: SHIPPED | OUTPATIENT
Start: 2025-03-05

## 2025-03-05 NOTE — PROGRESS NOTES
Subjective:         Patient ID: Ene Clemons is a 59 y.o. female.    Chief Complaint: Hip Pain (Patient is having bilateral hip pain.)      Pain  This is a chronic problem. The current episode started more than 1 year ago. The problem has been unchanged. Associated symptoms include arthralgias. Pertinent negatives include no anorexia, change in bowel habit, chest pain, chills, diaphoresis, fever, sore throat, swollen glands, urinary symptoms or vertigo.     Review of Systems   Constitutional:  Negative for activity change, appetite change, chills, diaphoresis, fever and unexpected weight change.   HENT:  Negative for drooling, ear discharge, ear pain, facial swelling, nosebleeds, sore throat, trouble swallowing, voice change and goiter.    Eyes:  Negative for photophobia, pain, discharge, redness and visual disturbance.   Respiratory:  Negative for apnea, choking, chest tightness, shortness of breath, wheezing and stridor.    Cardiovascular:  Negative for chest pain, palpitations and leg swelling.   Gastrointestinal:  Negative for abdominal distention, anorexia, change in bowel habit, diarrhea, rectal pain and fecal incontinence.   Endocrine: Negative for cold intolerance, heat intolerance, polydipsia, polyphagia and polyuria.   Genitourinary:  Negative for bladder incontinence, dysuria, flank pain, frequency and hot flashes.   Musculoskeletal:  Positive for arthralgias, back pain and leg pain.   Integumentary:  Negative for color change and pallor.   Allergic/Immunologic: Negative for immunocompromised state.   Neurological:  Negative for dizziness, vertigo, seizures, syncope, facial asymmetry, speech difficulty, light-headedness, memory loss and coordination difficulties.   Hematological:  Negative for adenopathy. Does not bruise/bleed easily.   Psychiatric/Behavioral:  Negative for agitation, behavioral problems, confusion, decreased concentration, dysphoric mood, hallucinations, self-injury and suicidal  ideas. The patient is not nervous/anxious and is not hyperactive.            Past Medical History:   Diagnosis Date    Arthritis     Breast cancer screening by mammogram 05/15/2024    [facility]    Depression     Diabetic eye exam 2023    Dr. Sp[facility]er [facility]    Diabetic eye exam 2024    Dr. Spenser [facility]    Hyperlipidemia     Hypothyroidism     Thyroid disease      Past Surgical History:   Procedure Laterality Date    ADENOIDECTOMY      CHOLECYSTECTOMY      HYSTERECTOMY      RADIOFREQUENCY THERMOCOAGULATION Bilateral 3/26/2024    Procedure: RADIOFREQUENCY THERMAL COAGULATION SI;  Surgeon: Radhika Neff MD;  Location: [facility];  Service: Pain Management;  Laterality: Bilateral;    TONSILLECTOMY      TOTAL THYROIDECTOMY       Social History     Socioeconomic History    Marital status:      Spouse name: Hector    Number of children: 0   Occupational History    Occupation: retired   Tobacco Use    Smoking status: Former     Current packs/day: 0.00     Average packs/day: 0.5 packs/day for 39.0 years (19.5 ttl pk-yrs)     Types: Cigarettes, Vaping with nicotine     Start date:      Quit date:      Years since quittin.1     Passive exposure: Past    Smokeless tobacco: Never    Tobacco comments:     Quit smoking last 2023. She vapes now   Substance and Sexual Activity    Alcohol use: Not Currently    Drug use: Never    Sexual activity: Yes     Partners: Male     Social Drivers of Health     Financial Resource Strain: High Risk (2024)    Overall Financial Resource Strain (CARDIA)     Difficulty of Paying Living Expenses: Hard   Food Insecurity: No Food Insecurity (2024)    Hunger Vital Sign     Worried About Running Out of Food in the Last Year: Never true     Ran Out of Food in the Last Year: Never true   Transportation Needs: No Transportation Needs (2024)    PRAPARE - Transportation     Lack of Transportation  "(Medical): No     Lack of Transportation (Non-Medical): No   Physical Activity: Insufficiently Active (4/23/2024)    Exercise Vital Sign     Days of Exercise per Week: 2 days     Minutes of Exercise per Session: 20 min   Stress: Stress Concern Present (4/23/2024)    [facility] - Occupational Stress Questionnaire     Feeling of Stress : To some extent   Housing Stability: Low Risk  (1/28/2022)    Housing Stability Vital Sign     Unable to Pay for Housing in the Last Year: No     Number of Places Lived in the Last Year: 0     Unstable Housing in the Last Year: No     Family History   Problem Relation Name Age of Onset    No Known Problems Mother      Arthritis Father      Heart disease Father      Cancer Father      No Known Problems Sister       Review of patient's allergies indicates:   Allergen Reactions    Doxycycline     Sulfa (sulfonamide antibiotics)         Objective:  Vitals:    03/05/25 0812 03/05/25 0814   BP: 113/71    Pulse: 72    Resp: 18    Weight: 88 kg (194 lb)    Height: 5' 8" (1.727 m)    PainSc:   4   4   PainLoc: Hip                Physical Exam  Vitals and nursing note reviewed. Exam conducted with a chaperone present.   Constitutional:       General: She is awake. She is not in acute distress.     Appearance: Normal appearance. She is not ill-appearing, toxic-appearing or diaphoretic.   HENT:      Head: Normocephalic and atraumatic.      Nose: Nose normal.      Mouth/Throat:      Mouth: Mucous membranes are moist.      Pharynx: Oropharynx is clear.   Eyes:      Conjunctiva/sclera: Conjunctivae normal.      Pupils: Pupils are equal, round, and reactive to light.   Cardiovascular:      Rate and Rhythm: Normal rate.   Pulmonary:      Effort: Pulmonary effort is normal. No respiratory distress.   Abdominal:      Palpations: Abdomen is soft.      Tenderness: There is no guarding.   Musculoskeletal:         General: Normal range of motion.      Cervical back: Normal range " of motion and neck supple. No rigidity.   Skin:     General: Skin is warm and dry.      Coloration: Skin is not jaundiced or pale.   Neurological:      General: No focal deficit present.      Mental Status: She is alert and oriented to person, place, and time. Mental status is at baseline.      Cranial Nerves: No cranial nerve deficit (II-XII).   Psychiatric:         Mood and Affect: Mood normal.         Behavior: Behavior normal. Behavior is cooperative.         Thought Content: Thought content normal.           FL Fluoro for Pain Management  See OP Notes for results.     IMPRESSION: See OP Notes for results.     This procedure was auto-finalized by: Virtual Radiologist       Office Visit on 12/10/2024   Component Date Value Ref Range Status    Hemoglobin A1C 12/10/2024 5.6  <=7.0 % Final    Estimated Average Glucose 12/10/2024 114  mg/dL Final         No orders of the defined types were placed in this encounter.      Requested Prescriptions     Signed Prescriptions Disp Refills    celecoxib (CELEBREX) 200 MG capsule 90 capsule 1     Sig: Take 1 capsule (200 mg total) by mouth once daily.    gabapentin (NEURONTIN) 300 MG capsule 270 capsule 1     Sig: Take 2 capsules (600 mg total) by mouth 3 (three) times daily.    HYDROcodone-acetaminophen (NORCO) 7.5-325 mg per tablet 60 tablet 0     Sig: Take 1 tablet by mouth every 12 (twelve) hours as needed for Pain.    methocarbamoL (ROBAXIN) 500 MG Tab 90 tablet 1     Sig: Take 1 tablet (500 mg total) by mouth 3 (three) times daily as needed.       Assessment:     1. Sacroiliitis    2. Lumbar radiculopathy                 X-ray lumbar spine [facility] December 6, 2023, no fracture noted multiple level degenerative changes mild anterior listhesis L4/5    X-ray bilateral hips /sacroiliac joint [facility] December 6, 2023 no fracture noted mild-to-moderate degenerative changes sacroiliac joint bilateral hips        Plan:      Last seen in clinic June 2024        Not  using narcotics on a daily basis, only p.r.n.        Physical therapy notes [facility] February 2024    She had bilateral sacroiliac RF TC March 26, 2024, patient had 80% relief after procedure, the procedure did help improve her level of function     Consider bilateral hip injections for bilateral hip pain and L3-4 epidural steroid injection for lumbar radiculopathy      She states her lower back pain/sacroiliac discomfort slowly returning she is considering repeating sacroiliac procedure     She is requesting refill medication    Continue Cymbalta, gabapentin, Celebrex on a daily basis     Continue hydrocodone p.r.n. last refill more than 1 year ago    Will continue home exercise program as directed     Follow-up 6 months, patient request    Dr. Neff March 2025    Bring original prescription medication bottles/container/box with labels to each visit

## 2025-03-17 DIAGNOSIS — M54.16 LUMBAR RADICULOPATHY: Chronic | ICD-10-CM

## 2025-03-17 DIAGNOSIS — M46.1 SACROILIITIS: Chronic | ICD-10-CM

## 2025-03-18 RX ORDER — GABAPENTIN 300 MG/1
CAPSULE ORAL
Refills: 1 | OUTPATIENT
Start: 2025-03-18

## 2025-04-14 ENCOUNTER — PATIENT MESSAGE (OUTPATIENT)
Dept: FAMILY MEDICINE | Facility: CLINIC | Age: 60
End: 2025-04-14
Payer: COMMERCIAL

## 2025-04-16 RX ORDER — OXYBUTYNIN CHLORIDE 15 MG/1
15 TABLET, EXTENDED RELEASE ORAL DAILY
Qty: 30 TABLET | Refills: 5 | Status: SHIPPED | OUTPATIENT
Start: 2025-04-16 | End: 2026-04-16

## 2025-05-09 DIAGNOSIS — E03.9 ACQUIRED HYPOTHYROIDISM: Chronic | ICD-10-CM

## 2025-05-09 RX ORDER — LEVOTHYROXINE SODIUM 100 UG/1
100 TABLET ORAL
Qty: 90 TABLET | Refills: 1 | Status: SHIPPED | OUTPATIENT
Start: 2025-05-09

## 2025-05-16 DIAGNOSIS — M46.1 SACROILIITIS: Chronic | ICD-10-CM

## 2025-05-16 DIAGNOSIS — M54.16 LUMBAR RADICULOPATHY: Chronic | ICD-10-CM

## 2025-05-19 RX ORDER — GABAPENTIN 300 MG/1
600 CAPSULE ORAL 3 TIMES DAILY
Qty: 270 CAPSULE | Refills: 1 | Status: SHIPPED | OUTPATIENT
Start: 2025-05-19 | End: 2025-05-21 | Stop reason: SDUPTHER

## 2025-05-21 ENCOUNTER — OFFICE VISIT (OUTPATIENT)
Dept: FAMILY MEDICINE | Facility: CLINIC | Age: 60
End: 2025-05-21
Payer: COMMERCIAL

## 2025-05-21 ENCOUNTER — RESULTS FOLLOW-UP (OUTPATIENT)
Dept: FAMILY MEDICINE | Facility: CLINIC | Age: 60
End: 2025-05-21

## 2025-05-21 VITALS
BODY MASS INDEX: 29.4 KG/M2 | SYSTOLIC BLOOD PRESSURE: 121 MMHG | HEIGHT: 68 IN | RESPIRATION RATE: 18 BRPM | OXYGEN SATURATION: 99 % | DIASTOLIC BLOOD PRESSURE: 71 MMHG | HEART RATE: 74 BPM | WEIGHT: 194 LBS | TEMPERATURE: 97 F

## 2025-05-21 DIAGNOSIS — E78.2 MIXED HYPERLIPIDEMIA: ICD-10-CM

## 2025-05-21 DIAGNOSIS — F33.9 RECURRENT MAJOR DEPRESSIVE DISORDER, REMISSION STATUS UNSPECIFIED: ICD-10-CM

## 2025-05-21 DIAGNOSIS — M54.16 LUMBAR RADICULOPATHY: Chronic | ICD-10-CM

## 2025-05-21 DIAGNOSIS — K21.9 GASTROESOPHAGEAL REFLUX DISEASE, UNSPECIFIED WHETHER ESOPHAGITIS PRESENT: ICD-10-CM

## 2025-05-21 DIAGNOSIS — Z79.4 TYPE 2 DIABETES MELLITUS WITHOUT COMPLICATION, WITH LONG-TERM CURRENT USE OF INSULIN: Primary | Chronic | ICD-10-CM

## 2025-05-21 DIAGNOSIS — Z12.11 SCREENING FOR COLORECTAL CANCER: ICD-10-CM

## 2025-05-21 DIAGNOSIS — Z12.12 SCREENING FOR COLORECTAL CANCER: ICD-10-CM

## 2025-05-21 DIAGNOSIS — N32.81 OVERACTIVE BLADDER: ICD-10-CM

## 2025-05-21 DIAGNOSIS — E03.9 ACQUIRED HYPOTHYROIDISM: Chronic | ICD-10-CM

## 2025-05-21 DIAGNOSIS — E11.9 TYPE 2 DIABETES MELLITUS WITHOUT COMPLICATION, WITH LONG-TERM CURRENT USE OF INSULIN: Primary | Chronic | ICD-10-CM

## 2025-05-21 DIAGNOSIS — M46.1 SACROILIITIS: Chronic | ICD-10-CM

## 2025-05-21 LAB
ALBUMIN SERPL BCP-MCNC: 4.1 G/DL (ref 3.4–4.8)
ALBUMIN/GLOB SERPL: 1.1 {RATIO}
ALP SERPL-CCNC: 90 U/L (ref 40–150)
ALT SERPL W P-5'-P-CCNC: 14 U/L
ANION GAP SERPL CALCULATED.3IONS-SCNC: 16 MMOL/L (ref 7–16)
AST SERPL W P-5'-P-CCNC: 26 U/L (ref 11–45)
BASOPHILS # BLD AUTO: 0.09 K/UL (ref 0–0.2)
BASOPHILS NFR BLD AUTO: 1.1 % (ref 0–1)
BILIRUB SERPL-MCNC: 0.3 MG/DL
BUN SERPL-MCNC: 18 MG/DL (ref 10–20)
BUN/CREAT SERPL: 21 (ref 6–20)
CALCIUM SERPL-MCNC: 9.6 MG/DL (ref 8.4–10.2)
CHLORIDE SERPL-SCNC: 106 MMOL/L (ref 98–107)
CHOLEST SERPL-MCNC: 192 MG/DL
CHOLEST/HDLC SERPL: 4.4 {RATIO}
CO2 SERPL-SCNC: 23 MMOL/L (ref 23–31)
CREAT SERPL-MCNC: 0.85 MG/DL (ref 0.55–1.02)
CREAT UR-MCNC: 105 MG/DL (ref 15–325)
DIFFERENTIAL METHOD BLD: ABNORMAL
EGFR (NO RACE VARIABLE) (RUSH/TITUS): 79 ML/MIN/1.73M2
EOSINOPHIL # BLD AUTO: 0.31 K/UL (ref 0–0.5)
EOSINOPHIL NFR BLD AUTO: 3.9 % (ref 1–4)
ERYTHROCYTE [DISTWIDTH] IN BLOOD BY AUTOMATED COUNT: 12.9 % (ref 11.5–14.5)
EST. AVERAGE GLUCOSE BLD GHB EST-MCNC: 123 MG/DL
GLOBULIN SER-MCNC: 3.6 G/DL (ref 2–4)
GLUCOSE SERPL-MCNC: 127 MG/DL (ref 82–115)
HBA1C MFR BLD HPLC: 5.9 %
HCT VFR BLD AUTO: 38.6 % (ref 38–47)
HDLC SERPL-MCNC: 44 MG/DL (ref 35–60)
HGB BLD-MCNC: 12.7 G/DL (ref 12–16)
IMM GRANULOCYTES # BLD AUTO: 0.07 K/UL (ref 0–0.04)
IMM GRANULOCYTES NFR BLD: 0.9 % (ref 0–0.4)
LDLC SERPL CALC-MCNC: 103 MG/DL
LDLC/HDLC SERPL: 2.3 {RATIO}
LYMPHOCYTES # BLD AUTO: 2.13 K/UL (ref 1–4.8)
LYMPHOCYTES NFR BLD AUTO: 26.5 % (ref 27–41)
MCH RBC QN AUTO: 29 PG (ref 27–31)
MCHC RBC AUTO-ENTMCNC: 32.9 G/DL (ref 32–36)
MCV RBC AUTO: 88.1 FL (ref 80–96)
MICROALBUMIN UR-MCNC: 0.6 MG/DL
MICROALBUMIN/CREAT RATIO PNL UR: 5.7 MG/G (ref 0–30)
MONOCYTES # BLD AUTO: 0.49 K/UL (ref 0–0.8)
MONOCYTES NFR BLD AUTO: 6.1 % (ref 2–6)
MPC BLD CALC-MCNC: 10.6 FL (ref 9.4–12.4)
NEUTROPHILS # BLD AUTO: 4.95 K/UL (ref 1.8–7.7)
NEUTROPHILS NFR BLD AUTO: 61.5 % (ref 53–65)
NONHDLC SERPL-MCNC: 148 MG/DL
NRBC # BLD AUTO: 0 X10E3/UL
NRBC, AUTO (.00): 0 %
PLATELET # BLD AUTO: 279 K/UL (ref 150–400)
POTASSIUM SERPL-SCNC: 4.6 MMOL/L (ref 3.5–5.1)
PROT SERPL-MCNC: 7.7 G/DL (ref 5.8–7.6)
RBC # BLD AUTO: 4.38 M/UL (ref 4.2–5.4)
SODIUM SERPL-SCNC: 140 MMOL/L (ref 136–145)
TRIGL SERPL-MCNC: 224 MG/DL (ref 37–140)
TSH SERPL DL<=0.005 MIU/L-ACNC: 0.84 UIU/ML (ref 0.35–4.94)
VLDLC SERPL-MCNC: 45 MG/DL
WBC # BLD AUTO: 8.04 K/UL (ref 4.5–11)

## 2025-05-21 PROCEDURE — 83036 HEMOGLOBIN GLYCOSYLATED A1C: CPT | Mod: ,,, | Performed by: CLINICAL MEDICAL LABORATORY

## 2025-05-21 PROCEDURE — 82043 UR ALBUMIN QUANTITATIVE: CPT | Mod: ,,, | Performed by: CLINICAL MEDICAL LABORATORY

## 2025-05-21 PROCEDURE — 80050 GENERAL HEALTH PANEL: CPT | Mod: ,,, | Performed by: CLINICAL MEDICAL LABORATORY

## 2025-05-21 PROCEDURE — 80061 LIPID PANEL: CPT | Mod: ,,, | Performed by: CLINICAL MEDICAL LABORATORY

## 2025-05-21 PROCEDURE — 82570 ASSAY OF URINE CREATININE: CPT | Mod: ,,, | Performed by: CLINICAL MEDICAL LABORATORY

## 2025-05-21 PROCEDURE — 99214 OFFICE O/P EST MOD 30 MIN: CPT | Mod: ,,, | Performed by: FAMILY MEDICINE

## 2025-05-21 RX ORDER — CELECOXIB 200 MG/1
200 CAPSULE ORAL DAILY
Qty: 90 CAPSULE | Refills: 1 | Status: SHIPPED | OUTPATIENT
Start: 2025-05-21

## 2025-05-21 RX ORDER — GABAPENTIN 300 MG/1
600 CAPSULE ORAL 3 TIMES DAILY
Qty: 270 CAPSULE | Refills: 1 | Status: SHIPPED | OUTPATIENT
Start: 2025-05-21 | End: 2025-08-19

## 2025-05-21 RX ORDER — LEVOTHYROXINE SODIUM 100 UG/1
100 TABLET ORAL
Qty: 90 TABLET | Refills: 1 | Status: SHIPPED | OUTPATIENT
Start: 2025-05-21

## 2025-05-21 RX ORDER — METHOCARBAMOL 500 MG/1
500 TABLET, FILM COATED ORAL 3 TIMES DAILY PRN
Qty: 90 TABLET | Refills: 1 | Status: SHIPPED | OUTPATIENT
Start: 2025-05-21

## 2025-05-21 RX ORDER — SEMAGLUTIDE 0.68 MG/ML
0.5 INJECTION, SOLUTION SUBCUTANEOUS
Qty: 9 ML | Refills: 1 | Status: SHIPPED | OUTPATIENT
Start: 2025-05-21

## 2025-05-21 RX ORDER — SIMVASTATIN 10 MG/1
10 TABLET, FILM COATED ORAL NIGHTLY
Qty: 90 TABLET | Refills: 1 | Status: SHIPPED | OUTPATIENT
Start: 2025-05-21 | End: 2026-05-21

## 2025-05-21 RX ORDER — CITALOPRAM 40 MG/1
40 TABLET ORAL DAILY
Qty: 90 TABLET | Refills: 1 | Status: SHIPPED | OUTPATIENT
Start: 2025-05-21

## 2025-05-21 RX ORDER — OXYBUTYNIN CHLORIDE 15 MG/1
15 TABLET, EXTENDED RELEASE ORAL DAILY
Qty: 90 TABLET | Refills: 1 | Status: SHIPPED | OUTPATIENT
Start: 2025-05-21 | End: 2026-05-21

## 2025-05-21 RX ORDER — OMEPRAZOLE 40 MG/1
40 CAPSULE, DELAYED RELEASE ORAL DAILY
Qty: 90 CAPSULE | Refills: 1 | Status: SHIPPED | OUTPATIENT
Start: 2025-05-21 | End: 2025-11-17

## 2025-05-21 NOTE — PROGRESS NOTES
"New Clinic Note    Een Clemons is a 59 y.o. female     CC:   Chief Complaint   Patient presents with    Health Maintenance     Hepatitis C Screening declined  TETANUS VACCINE declined  Shingles Vaccine(1 of 2) done at [facility] last fall  COVID-19 Vaccine(1 - 2024-25 season) decline  Colorectal Cancer Screening due on 11/26/2024 Schedule with Dr Spears please  Diabetes Urine Screening due on 04/26/2025  Foot Exam due on 04/26/2025  Lipid Panel due on 04/26/2025  Mammogram due on 05/15/2025 Will be done at [facility]  Diabetic Eye Exam due on 05/22/25 with Dr Spenser Lowery  Hemoglobin A1c due on 06/10/2025     Diabetes     Patient is here for routine 6 month check up. She is "fasting" for labs. Needs refills sent to [facility]. Health Maintenance reviewed. Needs C-Scope scheduled with Dr Spears.     Hyperlipidemia    Hypothyroidism    Anxiety    Depression    Gastroesophageal Reflux        Subjective    History of Present Illness HPI   Patient is for evaluation of chronic medical problems. Patient needs refills. Ene  is tolerating medications well without side effects.     Current Medications[1]     Past Medical History:   Diagnosis Date    Arthritis     Breast cancer screening by mammogram 05/15/2024    [facility]    Depression     Diabetic eye exam 02/27/2023    Dr. Spenser Lowery [facility]    Diabetic eye exam 05/17/2024    Dr. Dueñas [facility]    Hyperlipidemia     Hypothyroidism     Thyroid disease         Family History   Problem Relation Name Age of Onset    No Known Problems Mother      Arthritis Father      Heart disease Father      Cancer Father      No Known Problems Sister          Past Surgical History:   Procedure Laterality Date    ADENOIDECTOMY      CHOLECYSTECTOMY      HYSTERECTOMY      RADIOFREQUENCY THERMOCOAGULATION Bilateral 3/26/2024    Procedure: RADIOFREQUENCY THERMAL COAGULATION SI;  Surgeon: Radhika Neff MD;  Location: [facility];  Service: Pain " "Management;  Laterality: Bilateral;    TONSILLECTOMY      TOTAL THYROIDECTOMY          Review of Systems   Constitutional:  Negative for fatigue and fever.   HENT:  Negative for ear pain, postnasal drip, rhinorrhea and sinus pressure/congestion.    Respiratory:  Negative for cough and shortness of breath.    Cardiovascular:  Negative for chest pain.   Gastrointestinal:  Negative for abdominal pain, diarrhea, nausea and vomiting.   Genitourinary:  Negative for dysuria.   Neurological:  Negative for headaches.        /71 (BP Location: Left arm, Patient Position: Sitting)   Pulse 74   Temp 97.2 °F (36.2 °C) (Oral)   Resp 18   Ht 5' 8" (1.727 m)   Wt 88 kg (194 lb)   SpO2 99%   BMI 29.50 kg/m²      Physical Exam  HENT:      Head: Normocephalic and atraumatic.   Cardiovascular:      Rate and Rhythm: Normal rate and regular rhythm.   Pulmonary:      Effort: Pulmonary effort is normal.      Breath sounds: Normal breath sounds.   Neurological:      Mental Status: She is alert and oriented to person, place, and time.   Psychiatric:         Mood and Affect: Mood normal.         Behavior: Behavior normal.          Assessment and Plan      ICD-10-CM ICD-9-CM   1. Type 2 diabetes mellitus without complication, with long-term current use of insulin  E11.9 250.00    Z79.4 V58.67   2. Lumbar radiculopathy  M54.16 724.4   3. Recurrent major depressive disorder, remission status unspecified  F33.9 296.30   4. Sacroiliitis  M46.1 720.2   5. Gastroesophageal reflux disease, unspecified whether esophagitis present  K21.9 530.81   6. Mixed hyperlipidemia  E78.2 272.2   7. Acquired hypothyroidism  E03.9 244.9   8. Overactive bladder  N32.81 596.51   9. Screening for colorectal cancer  Z12.11 V76.51    Z12.12 V76.41        1. Type 2 diabetes mellitus without complication, with long-term current use of insulin  The current medical regimen is effective;  continue present plan and medications.  -     Hemoglobin A1C; Future; " Expected date: 05/21/2025  -     Microalbumin/Creatinine Ratio, Urine  -     semaglutide (OZEMPIC) 0.25 mg or 0.5 mg (2 mg/3 mL) pen injector; Inject 0.5 mg into the skin every 7 days.  Dispense: 9 mL; Refill: 1    2. Lumbar radiculopathy  The current medical regimen is effective;  continue present plan and medications.  -     celecoxib (CELEBREX) 200 MG capsule; Take 1 capsule (200 mg total) by mouth once daily.  Dispense: 90 capsule; Refill: 1  -     gabapentin (NEURONTIN) 300 MG capsule; Take 2 capsules (600 mg total) by mouth 3 (three) times daily.  Dispense: 270 capsule; Refill: 1  -     methocarbamoL (ROBAXIN) 500 MG Tab; Take 1 tablet (500 mg total) by mouth 3 (three) times daily as needed (muscle spasm).  Dispense: 90 tablet; Refill: 1    3. Recurrent major depressive disorder, remission status unspecified  The current medical regimen is effective;  continue present plan and medications.  -     citalopram (CELEXA) 40 MG tablet; Take 1 tablet (40 mg total) by mouth once daily.  Dispense: 90 tablet; Refill: 1    4. Sacroiliitis  The current medical regimen is effective;  continue present plan and medications.  -     gabapentin (NEURONTIN) 300 MG capsule; Take 2 capsules (600 mg total) by mouth 3 (three) times daily.  Dispense: 270 capsule; Refill: 1    5. Gastroesophageal reflux disease, unspecified whether esophagitis present  The current medical regimen is effective;  continue present plan and medications.  -     omeprazole (PRILOSEC) 40 MG capsule; Take 1 capsule (40 mg total) by mouth once daily.  Dispense: 90 capsule; Refill: 1    6. Mixed hyperlipidemia  The current medical regimen is effective;  continue present plan and medications.  -     simvastatin (ZOCOR) 10 MG tablet; Take 1 tablet (10 mg total) by mouth every evening.  Dispense: 90 tablet; Refill: 1  -     Comprehensive Metabolic Panel; Future; Expected date: 05/21/2025  -     CBC Auto Differential; Future; Expected date: 05/21/2025  -     Lipid  Panel; Future; Expected date: 05/21/2025    7. Acquired hypothyroidism  The current medical regimen is effective;  continue present plan and medications.  -     levothyroxine (SYNTHROID) 100 MCG tablet; Take 1 tablet (100 mcg total) by mouth before breakfast.  Dispense: 90 tablet; Refill: 1  -     TSH; Future; Expected date: 05/21/2025    8. Overactive bladder  The current medical regimen is effective;  continue present plan and medications.  -     oxybutynin (DITROPAN XL) 15 MG TR24; Take 1 tablet (15 mg total) by mouth once daily.  Dispense: 90 tablet; Refill: 1    9. Screening for colorectal cancer  -     Ambulatory referral/consult to General Surgery; Future; Expected date: 05/28/2025       She will schedule her own mammogram. Eye exam is scheduled for tomorrow.     Follow up in about 6 months (around 11/21/2025).            [1]   Current Outpatient Medications:     acyclovir (ZOVIRAX) 400 MG tablet, Take 1 tablet (400 mg total) by mouth 2 (two) times daily., Disp: 60 tablet, Rfl: 11    HYDROcodone-acetaminophen (NORCO) 7.5-325 mg per tablet, Take 1 tablet by mouth every 12 (twelve) hours as needed for Pain., Disp: 60 tablet, Rfl: 0    metFORMIN (GLUCOPHAGE) 1000 MG tablet, Take 1 tablet (1,000 mg total) by mouth 2 (two) times daily., Disp: 180 tablet, Rfl: 1    celecoxib (CELEBREX) 200 MG capsule, Take 1 capsule (200 mg total) by mouth once daily., Disp: 90 capsule, Rfl: 1    citalopram (CELEXA) 40 MG tablet, Take 1 tablet (40 mg total) by mouth once daily., Disp: 90 tablet, Rfl: 1    gabapentin (NEURONTIN) 300 MG capsule, Take 2 capsules (600 mg total) by mouth 3 (three) times daily., Disp: 270 capsule, Rfl: 1    levothyroxine (SYNTHROID) 100 MCG tablet, Take 1 tablet (100 mcg total) by mouth before breakfast., Disp: 90 tablet, Rfl: 1    methocarbamoL (ROBAXIN) 500 MG Tab, Take 1 tablet (500 mg total) by mouth 3 (three) times daily as needed (muscle spasm)., Disp: 90 tablet, Rfl: 1    omeprazole (PRILOSEC) 40  MG capsule, Take 1 capsule (40 mg total) by mouth once daily., Disp: 90 capsule, Rfl: 1    oxybutynin (DITROPAN XL) 15 MG TR24, Take 1 tablet (15 mg total) by mouth once daily., Disp: 90 tablet, Rfl: 1    semaglutide (OZEMPIC) 0.25 mg or 0.5 mg (2 mg/3 mL) pen injector, Inject 0.5 mg into the skin every 7 days., Disp: 9 mL, Rfl: 1    simvastatin (ZOCOR) 10 MG tablet, Take 1 tablet (10 mg total) by mouth every evening., Disp: 90 tablet, Rfl: 1

## 2025-05-22 ENCOUNTER — TELEPHONE (OUTPATIENT)
Dept: FAMILY MEDICINE | Facility: CLINIC | Age: 60
End: 2025-05-22
Payer: COMMERCIAL

## 2025-05-22 ENCOUNTER — PATIENT OUTREACH (OUTPATIENT)
Facility: HOSPITAL | Age: 60
End: 2025-05-22
Payer: COMMERCIAL

## 2025-05-22 ENCOUNTER — PATIENT MESSAGE (OUTPATIENT)
Dept: FAMILY MEDICINE | Facility: CLINIC | Age: 60
End: 2025-05-22
Payer: COMMERCIAL

## 2025-05-22 DIAGNOSIS — Z12.31 ENCOUNTER FOR SCREENING MAMMOGRAM FOR MALIGNANT NEOPLASM OF BREAST: Primary | ICD-10-CM

## 2025-05-22 LAB
LEFT EYE DM RETINOPATHY: NEGATIVE
RIGHT EYE DM RETINOPATHY: NEGATIVE

## 2025-05-22 NOTE — TELEPHONE ENCOUNTER
Needs yearly mammogram scheduled with [facility] in June (already scheduled) needs orders

## 2025-05-22 NOTE — LETTER
AUTHORIZATION FOR RELEASE OF   CONFIDENTIAL INFORMATION    Dear Dr. Lowery,    We are seeing Ene Clemons, date of birth 1965, in the clinic at [facility]. Lawanda Hargrove MD is the patient's PCP. Ene Clemons has an outstanding lab/procedure at the time we reviewed her chart. In order to help keep her health information updated, she has authorized us to request the following medical record(s):        (  )  MAMMOGRAM                                      (  )  COLONOSCOPY      (  )  PAP SMEAR                                          (  )  OUTSIDE LAB RESULTS     (  )  DEXA SCAN                                          ( x )  EYE EXAM            (  )  FOOT EXAM                                          (  )  ENTIRE RECORD     (  )  OUTSIDE IMMUNIZATIONS                 (  )  _______________         Please fax records to Maria L Abarca LPN Care Coordinator at [phone].      If you have any questions, please contact Maria L at [phone].           Patient Name: Ene Clemons  : 1965  Patient Phone #: [phone]

## 2025-05-22 NOTE — PROGRESS NOTES
Population Health Chart Review & Patient Outreach Details    Health Maintenance Topics Addressed and Outreach Outcomes / Actions Taken:  Diabetic Eye Exam [x] NAV sent to [facility].

## 2025-06-19 ENCOUNTER — PATIENT OUTREACH (OUTPATIENT)
Facility: HOSPITAL | Age: 60
End: 2025-06-19
Payer: COMMERCIAL

## 2025-06-19 NOTE — PROGRESS NOTES
Population Health Chart Review & Patient Outreach Details    Health Maintenance Topics Addressed and Outreach Outcomes / Actions Taken:  Diabetic Eye Exam [x] HM Updated with May 2025 Eye Exam (Dr. Lowery). History Updated.

## 2025-06-25 LAB — BCS RECOMMENDATION EXT: NORMAL

## 2025-06-30 DIAGNOSIS — M54.16 LUMBAR RADICULOPATHY: Chronic | ICD-10-CM

## 2025-06-30 DIAGNOSIS — M46.1 SACROILIITIS: Chronic | ICD-10-CM

## 2025-06-30 NOTE — TELEPHONE ENCOUNTER
Script was sent in for 270 caps of Neurontin to [facility]  mail order. Pharmacy sent a request for a 90 day supply which would be 540 caps.

## 2025-07-01 ENCOUNTER — PATIENT MESSAGE (OUTPATIENT)
Dept: FAMILY MEDICINE | Facility: CLINIC | Age: 60
End: 2025-07-01
Payer: COMMERCIAL

## 2025-07-01 DIAGNOSIS — E11.9 TYPE 2 DIABETES MELLITUS WITHOUT COMPLICATION, WITH LONG-TERM CURRENT USE OF INSULIN: Chronic | ICD-10-CM

## 2025-07-01 DIAGNOSIS — Z79.4 TYPE 2 DIABETES MELLITUS WITHOUT COMPLICATION, WITH LONG-TERM CURRENT USE OF INSULIN: Chronic | ICD-10-CM

## 2025-07-02 DIAGNOSIS — Z79.4 TYPE 2 DIABETES MELLITUS WITHOUT COMPLICATION, WITH LONG-TERM CURRENT USE OF INSULIN: Chronic | ICD-10-CM

## 2025-07-02 DIAGNOSIS — E11.9 TYPE 2 DIABETES MELLITUS WITHOUT COMPLICATION, WITH LONG-TERM CURRENT USE OF INSULIN: Chronic | ICD-10-CM

## 2025-07-02 DIAGNOSIS — Z12.11 SCREENING FOR MALIGNANT NEOPLASM OF COLON: Primary | ICD-10-CM

## 2025-07-02 RX ORDER — GABAPENTIN 300 MG/1
600 CAPSULE ORAL 3 TIMES DAILY
Qty: 540 CAPSULE | Refills: 1 | Status: SHIPPED | OUTPATIENT
Start: 2025-07-02 | End: 2025-09-30

## 2025-07-02 RX ORDER — SEMAGLUTIDE 0.68 MG/ML
0.5 INJECTION, SOLUTION SUBCUTANEOUS
Qty: 9 ML | Refills: 1 | Status: SHIPPED | OUTPATIENT
Start: 2025-07-02 | End: 2025-07-03 | Stop reason: SDUPTHER

## 2025-07-02 RX ORDER — SEMAGLUTIDE 0.68 MG/ML
0.5 INJECTION, SOLUTION SUBCUTANEOUS
Qty: 9 ML | Refills: 1 | Status: SHIPPED | OUTPATIENT
Start: 2025-07-02 | End: 2025-07-02 | Stop reason: SDUPTHER

## 2025-07-02 NOTE — TELEPHONE ENCOUNTER
Patient needs a refill on Ozempic sent to [facility] in [place]. This got sent to [facility] mail order instead. Also her insurance does not cover DR. Spears. She needs her colonoscopy rescheduled with Dr. Mobley in [place].

## 2025-07-03 ENCOUNTER — HOSPITAL ENCOUNTER (OUTPATIENT)
Dept: RADIOLOGY | Facility: HOSPITAL | Age: 60
Discharge: HOME | End: 2025-07-03
Attending: FAMILY MEDICINE
Payer: COMMERCIAL

## 2025-07-03 ENCOUNTER — OFFICE VISIT (OUTPATIENT)
Dept: FAMILY MEDICINE | Facility: CLINIC | Age: 60
End: 2025-07-03
Payer: COMMERCIAL

## 2025-07-03 ENCOUNTER — RESULTS FOLLOW-UP (OUTPATIENT)
Dept: FAMILY MEDICINE | Facility: CLINIC | Age: 60
End: 2025-07-03

## 2025-07-03 ENCOUNTER — PATIENT OUTREACH (OUTPATIENT)
Facility: HOSPITAL | Age: 60
End: 2025-07-03
Payer: COMMERCIAL

## 2025-07-03 VITALS
RESPIRATION RATE: 18 BRPM | WEIGHT: 198 LBS | BODY MASS INDEX: 30.01 KG/M2 | DIASTOLIC BLOOD PRESSURE: 67 MMHG | HEIGHT: 68 IN | TEMPERATURE: 98 F | SYSTOLIC BLOOD PRESSURE: 100 MMHG | OXYGEN SATURATION: 97 % | HEART RATE: 70 BPM

## 2025-07-03 DIAGNOSIS — E11.9 TYPE 2 DIABETES MELLITUS WITHOUT COMPLICATION, WITH LONG-TERM CURRENT USE OF INSULIN: Chronic | ICD-10-CM

## 2025-07-03 DIAGNOSIS — M54.2 CERVICAL PAIN (NECK): Primary | ICD-10-CM

## 2025-07-03 DIAGNOSIS — M54.2 CERVICAL PAIN (NECK): ICD-10-CM

## 2025-07-03 DIAGNOSIS — Z79.4 TYPE 2 DIABETES MELLITUS WITHOUT COMPLICATION, WITH LONG-TERM CURRENT USE OF INSULIN: Chronic | ICD-10-CM

## 2025-07-03 PROCEDURE — 72050 X-RAY EXAM NECK SPINE 4/5VWS: CPT | Mod: TC,PN

## 2025-07-03 PROCEDURE — 73030 X-RAY EXAM OF SHOULDER: CPT | Mod: TC,PN,RT

## 2025-07-03 PROCEDURE — 72050 X-RAY EXAM NECK SPINE 4/5VWS: CPT | Mod: 26,,, | Performed by: RADIOLOGY

## 2025-07-03 PROCEDURE — 73030 X-RAY EXAM OF SHOULDER: CPT | Mod: 26,RT,, | Performed by: RADIOLOGY

## 2025-07-03 RX ORDER — KETOROLAC TROMETHAMINE 30 MG/ML
30 INJECTION, SOLUTION INTRAMUSCULAR; INTRAVENOUS
Status: COMPLETED | OUTPATIENT
Start: 2025-07-03 | End: 2025-07-03

## 2025-07-03 RX ORDER — METHYLPREDNISOLONE ACETATE 40 MG/ML
40 INJECTION, SUSPENSION INTRA-ARTICULAR; INTRALESIONAL; INTRAMUSCULAR; SOFT TISSUE
Status: COMPLETED | OUTPATIENT
Start: 2025-07-03 | End: 2025-07-03

## 2025-07-03 RX ORDER — SEMAGLUTIDE 0.68 MG/ML
0.5 INJECTION, SOLUTION SUBCUTANEOUS
Qty: 9 ML | Refills: 1 | Status: SHIPPED | OUTPATIENT
Start: 2025-07-03

## 2025-07-03 RX ORDER — DEXAMETHASONE SODIUM PHOSPHATE 4 MG/ML
4 INJECTION, SOLUTION INTRA-ARTICULAR; INTRALESIONAL; INTRAMUSCULAR; INTRAVENOUS; SOFT TISSUE
Status: COMPLETED | OUTPATIENT
Start: 2025-07-03 | End: 2025-07-03

## 2025-07-03 RX ADMIN — DEXAMETHASONE SODIUM PHOSPHATE 4 MG: 4 INJECTION, SOLUTION INTRA-ARTICULAR; INTRALESIONAL; INTRAMUSCULAR; INTRAVENOUS; SOFT TISSUE at 11:07

## 2025-07-03 RX ADMIN — METHYLPREDNISOLONE ACETATE 40 MG: 40 INJECTION, SUSPENSION INTRA-ARTICULAR; INTRALESIONAL; INTRAMUSCULAR; SOFT TISSUE at 11:07

## 2025-07-03 RX ADMIN — KETOROLAC TROMETHAMINE 30 MG: 30 INJECTION, SOLUTION INTRAMUSCULAR; INTRAVENOUS at 11:07

## 2025-07-03 NOTE — PROGRESS NOTES
Population Health Chart Review & Patient Outreach Details    Health Maintenance Topics Addressed and Outreach Outcomes / Actions Taken:  Breast Cancer Screening [x] HM Updated with June 2025 Eye Exam ([facility]). History Updated.

## 2025-07-03 NOTE — TELEPHONE ENCOUNTER
Ozempic needs to be sent to pharmacy again. Yesterday received a message steffen transmission to pharmacy failed.
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
## 2025-07-07 ENCOUNTER — OFFICE VISIT (OUTPATIENT)
Dept: PAIN MEDICINE | Facility: CLINIC | Age: 60
End: 2025-07-07
Payer: COMMERCIAL

## 2025-07-07 ENCOUNTER — PATIENT MESSAGE (OUTPATIENT)
Dept: FAMILY MEDICINE | Facility: CLINIC | Age: 60
End: 2025-07-07
Payer: COMMERCIAL

## 2025-07-07 VITALS
WEIGHT: 195 LBS | HEIGHT: 68 IN | SYSTOLIC BLOOD PRESSURE: 141 MMHG | RESPIRATION RATE: 20 BRPM | HEART RATE: 74 BPM | DIASTOLIC BLOOD PRESSURE: 80 MMHG | BODY MASS INDEX: 29.55 KG/M2

## 2025-07-07 DIAGNOSIS — Z79.899 ENCOUNTER FOR LONG-TERM (CURRENT) USE OF MEDICATIONS: ICD-10-CM

## 2025-07-07 DIAGNOSIS — M46.1 SACROILIITIS: Chronic | ICD-10-CM

## 2025-07-07 DIAGNOSIS — M54.12 RADICULOPATHY, CERVICAL REGION: Primary | ICD-10-CM

## 2025-07-07 DIAGNOSIS — E78.2 MIXED HYPERLIPIDEMIA: ICD-10-CM

## 2025-07-07 DIAGNOSIS — M54.16 LUMBAR RADICULOPATHY: Chronic | ICD-10-CM

## 2025-07-07 DIAGNOSIS — F33.9 RECURRENT MAJOR DEPRESSIVE DISORDER, REMISSION STATUS UNSPECIFIED: ICD-10-CM

## 2025-07-07 LAB

## 2025-07-07 PROCEDURE — 99215 OFFICE O/P EST HI 40 MIN: CPT | Mod: PBBFAC | Performed by: PHYSICIAN ASSISTANT

## 2025-07-07 PROCEDURE — 80305 DRUG TEST PRSMV DIR OPT OBS: CPT | Mod: PBBFAC | Performed by: PHYSICIAN ASSISTANT

## 2025-07-07 PROCEDURE — 99214 OFFICE O/P EST MOD 30 MIN: CPT | Mod: S$PBB,,, | Performed by: PHYSICIAN ASSISTANT

## 2025-07-07 PROCEDURE — 99999PBSHW POCT URINE DRUG SCREEN PRESUMP: Mod: PBBFAC,,,

## 2025-07-07 PROCEDURE — 99999 PR PBB SHADOW E&M-EST. PATIENT-LVL V: CPT | Mod: PBBFAC,,, | Performed by: PHYSICIAN ASSISTANT

## 2025-07-07 RX ORDER — SIMVASTATIN 10 MG/1
10 TABLET, FILM COATED ORAL NIGHTLY
Qty: 90 TABLET | Refills: 1 | Status: SHIPPED | OUTPATIENT
Start: 2025-07-07 | End: 2026-07-07

## 2025-07-07 RX ORDER — HYDROCODONE BITARTRATE AND ACETAMINOPHEN 7.5; 325 MG/1; MG/1
1 TABLET ORAL EVERY 12 HOURS PRN
Qty: 24 TABLET | Refills: 0 | Status: SHIPPED | OUTPATIENT
Start: 2025-07-07

## 2025-07-07 RX ORDER — CITALOPRAM 40 MG/1
40 TABLET ORAL DAILY
Qty: 90 TABLET | Refills: 1 | Status: SHIPPED | OUTPATIENT
Start: 2025-07-07

## 2025-07-07 NOTE — PROGRESS NOTES
Subjective:         Patient ID: Ene Clemons is a 59 y.o. female.    Chief Complaint: Shoulder Pain and Neck Pain      Pain  This is a chronic problem. The current episode started more than 1 year ago. The problem has been waxing and waning. Associated symptoms include arthralgias. Pertinent negatives include no anorexia, change in bowel habit, chest pain, chills, diaphoresis, fever, sore throat, swollen glands, urinary symptoms or vertigo.     Review of Systems   Constitutional:  Negative for activity change, appetite change, chills, diaphoresis, fever and unexpected weight change.   HENT:  Negative for drooling, ear discharge, ear pain, facial swelling, nosebleeds, sore throat, trouble swallowing, voice change and goiter.    Eyes:  Negative for photophobia, pain, discharge, redness and visual disturbance.   Respiratory:  Negative for apnea, choking, chest tightness, shortness of breath, wheezing and stridor.    Cardiovascular:  Negative for chest pain, palpitations and leg swelling.   Gastrointestinal:  Negative for abdominal distention, anorexia, change in bowel habit, diarrhea, rectal pain and fecal incontinence.   Endocrine: Negative for cold intolerance, heat intolerance, polydipsia, polyphagia and polyuria.   Genitourinary:  Negative for bladder incontinence, dysuria, flank pain, frequency and hot flashes.   Musculoskeletal:  Positive for arthralgias, back pain and leg pain.   Integumentary:  Negative for color change and pallor.   Allergic/Immunologic: Negative for immunocompromised state.   Neurological:  Negative for dizziness, vertigo, seizures, syncope, facial asymmetry, speech difficulty, light-headedness, coordination difficulties and memory loss.   Hematological:  Negative for adenopathy. Does not bruise/bleed easily.   Psychiatric/Behavioral:  Negative for agitation, behavioral problems, confusion, decreased concentration, dysphoric mood, hallucinations, self-injury and suicidal ideas. The  patient is not nervous/anxious and is not hyperactive.            Past Medical History:   Diagnosis Date    Arthritis     Breast cancer screening by mammogram 05/15/2024    [facility]    Breast cancer screening by mammogram 2025    [facility]    Depression     Diabetic eye exam 2023    Dr. Due[place]as [facility] [place]    Diabetic eye exam 2024    Dr. Dueñas [facility] [place]    Diabetic eye exam 2025    Dr. Rell CRUZ [facility] [place]    Hyperlipidemia     Hypothyroidism     Thyroid disease      Past Surgical History:   Procedure Laterality Date    ADENOIDECTOMY      CHOLECYSTECTOMY      HYSTERECTOMY      RADIOFREQUENCY THERMOCOAGULATION Bilateral 3/26/2024    Procedure: RADIOFREQUENCY THERMAL COAGULATION SI;  Surgeon: Radhika Neff MD;  Location: [facility];  Service: Pain Management;  Laterality: Bilateral;    TONSILLECTOMY      TOTAL THYROIDECTOMY       Social History     Socioeconomic History    Marital status:      Spouse name: Hector    Number of children: 0   Occupational History    Occupation: retired   Tobacco Use    Smoking status: Former     Current packs/day: 0.00     Average packs/day: 0.5 packs/day for 39.0 years (19.5 ttl pk-yrs)     Types: Cigarettes, Vaping with nicotine     Start date:      Quit date:      Years since quittin.5     Passive exposure: Past    Smokeless tobacco: Never    Tobacco comments:     Quit smoking last 2023. She vapes now   Substance and Sexual Activity    Alcohol use: Not Currently    Drug use: Never    Sexual activity: Yes     Partners: Male     Social Drivers of Health     Financial Resource Strain: Low Risk  (2025)    Overall Financial Resource Strain (CARDIA)     Difficulty of Paying Living Expenses: Not very hard   Food Insecurity: No Food Insecurity (2025)    Hunger Vital Sign     Worried About Running Out of Food in the Last Year: Never true     Ran Out of Food in  "the Last Year: Never true   Transportation Needs: No Transportation Needs (7/2/2025)    PRAPARE - Transportation     Lack of Transportation (Medical): No     Lack of Transportation (Non-Medical): No   Physical Activity: Insufficiently Active (7/2/2025)    Exercise Vital Sign     Days of Exercise per Week: 2 days     Minutes of Exercise per Session: 20 min   Stress: No Stress Concern Present (7/2/2025)    [employer] - Occupational Stress Questionnaire     Feeling of Stress : Not at all   Housing Stability: Low Risk  (7/2/2025)    Housing Stability Vital Sign     Unable to Pay for Housing in the Last Year: No     Number of Times Moved in the Last Year: 0     Homeless in the Last Year: No     Family History   Problem Relation Name Age of Onset    No Known Problems Mother      Arthritis Father      Heart disease Father      Cancer Father      No Known Problems Sister       Review of patient's allergies indicates:   Allergen Reactions    Doxycycline     Sulfa (sulfonamide antibiotics)         Objective:  Vitals:    07/07/25 1259 07/07/25 1300   BP: (!) 141/80    Pulse: 74    Resp: 20    Weight: 88.5 kg (195 lb)    Height: 5' 8" (1.727 m)    PainSc:   8   8                 Physical Exam  Vitals and nursing note reviewed. Exam conducted with a chaperone present.   Constitutional:       General: She is awake. She is not in acute distress.     Appearance: Normal appearance. She is not ill-appearing, toxic-appearing or diaphoretic.   HENT:      Head: Normocephalic and atraumatic.      Nose: Nose normal.      Mouth/Throat:      Mouth: Mucous membranes are moist.      Pharynx: Oropharynx is clear.   Eyes:      Conjunctiva/sclera: Conjunctivae normal.      Pupils: Pupils are equal, round, and reactive to light.   Cardiovascular:      Rate and Rhythm: Normal rate.   Pulmonary:      Effort: Pulmonary effort is normal. No respiratory distress.   Abdominal:      Palpations: Abdomen is soft.      " Tenderness: There is no guarding.   Musculoskeletal:         General: Normal range of motion.      Cervical back: Normal range of motion and neck supple. No rigidity.      Lumbar back: Tenderness present.   Skin:     General: Skin is warm and dry.      Coloration: Skin is not jaundiced or pale.   Neurological:      General: No focal deficit present.      Mental Status: She is alert and oriented to person, place, and time. Mental status is at baseline.      Cranial Nerves: No cranial nerve deficit (II-XII).   Psychiatric:         Mood and Affect: Mood normal.         Behavior: Behavior normal. Behavior is cooperative.         Thought Content: Thought content normal.           X-ray Shoulder 2 or More Views Right  Narrative: EXAMINATION:  XR SHOULDER COMPLETE 2 OR MORE VIEWS RIGHT    CLINICAL HISTORY:  right shoulder pain, muscle tenderness, tingling of hands, sharp pain x 2 weeks;Cervicalgia    TECHNIQUE:  Two or three views of the right shoulder were performed.    COMPARISON:  None    FINDINGS:  Bones are well mineralized.  The glenohumeral joint and AC joint appear intact.  No fracture or dislocation is seen.  Mild degenerative changes at the AC joint.  No soft tissue abnormality.  Impression: No acute abnormality    Electronically signed by: Gino Mock MD  Date:    07/03/2025  Time:    13:59  X-Ray Cervical Spine Complete 5 view  Narrative: EXAMINATION:  XR CERVICAL SPINE COMPLETE 5 VIEW    CLINICAL HISTORY:  bilateral arm pain, neck pain, right shoulder pain.;. Cervicalgia    TECHNIQUE:  AP, Lateral, bilateral oblique and open mouth views of the cervical spine were performed.    COMPARISON:  Plain film from 11/30/2020    FINDINGS:  Vertebral body heights are maintained.  Grade 1 (3 mm) retrolisthesis of C5 on C6.  Odontoid is intact.  Intervertebral disc space narrowing of C5-6 and C6-7.  Multilevel facet arthropathy.  No soft tissue abnormality.  Impression: As above.    Electronically signed by: Juan A  MD Sebastián  Date:    07/03/2025  Time:    13:57       Patient Outreach on 07/03/2025   Component Date Value Ref Range Status    BCS Recommendation External 06/25/2025 Repeat mammogram in 1 year   Final   Patient Outreach on 06/19/2025   Component Date Value Ref Range Status    Left Eye DM Retinopathy 05/22/2025 Negative  Negative Final    Right Eye DM Retinopathy 05/22/2025 Negative  Negative Final   Office Visit on 05/21/2025   Component Date Value Ref Range Status    Creatinine, Urine 05/21/2025 105  15 - 325 mg/dL Final    Microalbumin 05/21/2025 0.6  <=3.0 mg/dL Final    Microalbumin/Creatinine Ratio 05/21/2025 5.7  0.0 - 30.0 mg/g Final    Sodium 05/21/2025 140  136 - 145 mmol/L Final    Potassium 05/21/2025 4.6  3.5 - 5.1 mmol/L Final    Chloride 05/21/2025 106  98 - 107 mmol/L Final    CO2 05/21/2025 23  23 - 31 mmol/L Final    Anion Gap 05/21/2025 16  7 - 16 mmol/L Final    Glucose 05/21/2025 127 (H)  82 - 115 mg/dL Final    BUN 05/21/2025 18  10 - 20 mg/dL Final    Creatinine 05/21/2025 0.85  0.55 - 1.02 mg/dL Final    BUN/Creatinine Ratio 05/21/2025 21 (H)  6 - 20 Final    Calcium 05/21/2025 9.6  8.4 - 10.2 mg/dL Final    Total Protein 05/21/2025 7.7 (H)  5.8 - 7.6 g/dL Final    Albumin 05/21/2025 4.1  3.4 - 4.8 g/dL Final    Globulin 05/21/2025 3.6  2.0 - 4.0 g/dL Final    A/G Ratio 05/21/2025 1.1   Final    Bilirubin, Total 05/21/2025 0.3  <=1.5 mg/dL Final    Alk Phos 05/21/2025 90  40 - 150 U/L Final    ALT 05/21/2025 14  <=55 U/L Final    AST 05/21/2025 26  11 - 45 U/L Final    eGFR 05/21/2025 79  >=60 mL/min/1.73m2 Final    Triglycerides 05/21/2025 224 (H)  37 - 140 mg/dL Final    Cholesterol 05/21/2025 192  <=200 mg/dL Final    HDL Cholesterol 05/21/2025 44  35 - 60 mg/dL Final    Cholesterol/HDL Ratio (Risk Factor) 05/21/2025 4.4   Final    Non-HDL 05/21/2025 148  mg/dL Final    LDL Calculated 05/21/2025 103  mg/dL Final    LDL/HDL 05/21/2025 2.3   Final    VLDL 05/21/2025 45  mg/dL Final     Hemoglobin A1C 05/21/2025 5.9  <=7.0 % Final    Estimated Average Glucose 05/21/2025 123  mg/dL Final    TSH 05/21/2025 0.842  0.350 - 4.940 uIU/mL Final    WBC 05/21/2025 8.04  4.50 - 11.00 K/uL Final    RBC 05/21/2025 4.38  4.20 - 5.40 M/uL Final    Hemoglobin 05/21/2025 12.7  12.0 - 16.0 g/dL Final    Hematocrit 05/21/2025 38.6  38.0 - 47.0 % Final    MCV 05/21/2025 88.1  80.0 - 96.0 fL Final    MCH 05/21/2025 29.0  27.0 - 31.0 pg Final    MCHC 05/21/2025 32.9  32.0 - 36.0 g/dL Final    RDW 05/21/2025 12.9  11.5 - 14.5 % Final    Platelet Count 05/21/2025 279  150 - 400 K/uL Final    MPV 05/21/2025 10.6  9.4 - 12.4 fL Final    Neutrophils % 05/21/2025 61.5  53.0 - 65.0 % Final    Lymphocytes % 05/21/2025 26.5 (L)  27.0 - 41.0 % Final    Monocytes % 05/21/2025 6.1 (H)  2.0 - 6.0 % Final    Eosinophils % 05/21/2025 3.9  1.0 - 4.0 % Final    Basophils % 05/21/2025 1.1 (H)  0.0 - 1.0 % Final    Immature Granulocytes % 05/21/2025 0.9 (H)  0.0 - 0.4 % Final    nRBC, Auto 05/21/2025 0.0  <=0.0 % Final    Neutrophils, Abs 05/21/2025 4.95  1.80 - 7.70 K/uL Final    Lymphocytes, Absolute 05/21/2025 2.13  1.00 - 4.80 K/uL Final    Monocytes, Absolute 05/21/2025 0.49  0.00 - 0.80 K/uL Final    Eosinophils, Absolute 05/21/2025 0.31  0.00 - 0.50 K/uL Final    Basophils, Absolute 05/21/2025 0.09  0.00 - 0.20 K/uL Final    Immature Granulocytes, Absolute 05/21/2025 0.07 (H)  0.00 - 0.04 K/uL Final    nRBC, Absolute 05/21/2025 0.00  <=0.00 x10e3/uL Final    Diff Type 05/21/2025 Auto   Final         Orders Placed This Encounter   Procedures    MRI Cervical Spine Without Contrast     Standing Status:   Future     Expected Date:   7/7/2025     Expiration Date:   7/7/2026     Does the patient have or ever had a pacemaker or a defibrillator (Note: Some facilities may not be able to schedule an MRI for patients with pacemakers and defibrillators. You should contact your local radiology dept to determine if this is the case.)?:   No      Does the patient have an aneurysm or surgical clip, pump, nerve/brain stimulator, middle/inner ear prosthesis, or other metal implant or foreign object (bullet, shrapnel)? If they have a card related to their implant, ask them to bring it. Issues related to the implant may cause the MRI to be delayed.:   No     Is the patient claustrophobic?:   No     Will the patient require po anxiolysis or sedation?:   No     Does the patient have any of the following conditions? Diabetes, History of Renal Disease or Hypertension requiring medical therapy?:   No     May the Radiologist modify the order per protocol to meet the clinical needs of the patient?:   Yes     Is this part of a Research Study?:   No     Recist criteria?:   No     Will this service be billed to a Worker's Comp policy?:   No     Does the patient have on a skin patch for medication with aluminized backing?:   No    Ambulatory Referral/Consult to Physical Therapy     Standing Status:   Future     Expected Date:   7/14/2025     Expiration Date:   8/7/2026     Referral Priority:   Routine     Referral Type:   Physical Therapy     Referral Reason:   Specialty Services Required     Requested Specialty:   Physical Therapy     Number of Visits Requested:   1    POCT Urine Drug Screen Presump     Interpretive Information:     Negative:  No drug detected at the cut off level.   Positive:  This result represents presumptive positive for the   tested drug, other substances may yield a positive response other   than the analyte of interest. This result should be utilized for   diagnostic purpose only. Confirmation testing will be performed upon physician request only.          Requested Prescriptions     Signed Prescriptions Disp Refills    HYDROcodone-acetaminophen (NORCO) 7.5-325 mg per tablet 24 tablet 0     Sig: Take 1 tablet by mouth every 12 (twelve) hours as needed for Pain.       Assessment:     1. Radiculopathy, cervical region    2. Sacroiliitis    3. Lumbar  radiculopathy    4. Encounter for long-term (current) use of medications                   X-ray lumbar spine [facility] December 6, 2023, no fracture noted multiple level degenerative changes mild anterior listhesis L4/5    X-ray bilateral hips /sacroiliac joint [facility] December 6, 2023 no fracture noted mild-to-moderate degenerative changes sacroiliac joint bilateral hips        Plan:      Last seen in clinic June 2024        Not using narcotics on a daily basis, only p.r.n.        Physical therapy notes [facility] February 2024    She had bilateral sacroiliac RF TC March 26, 2024, patient had 80% relief after procedure, the procedure did help improve her level of function     Consider bilateral hip injections for bilateral hip pain and L3-4 epidural steroid injection for lumbar radiculopathy      She states her lower back pain/sacroiliac discomfort slowly returning she is considering repeating sacroiliac procedure     She is requesting refill medication/Norco    Continue Cymbalta, gabapentin, Celebrex on a daily basis     Continue hydrocodone p.r.n.     Patient complaining of neck pain right arm pain numbness and tingling radicular in nature ongoing for more than 3 months getting worse with time     Primary care provider x-ray her cervical spine and right shoulder no fractures noted     Patient was given steroid injection primary care provider she states it helped for a couple days but pain has returned    She denies loss of bowel or bladder function difficulty using her hand     She states the pain is severe limiting her ability to use her upper extremity right side due to pain    Will continue home exercise program as directed     Will order MRI cervical spine no contrast cervical radiculopathy  MRI for consideration procedure/surgery    I have given the patient medically directed home exercise program    Patient has tried NSAIDs and neuromodulators (neurontin, lyrica, elavil, or cymbalta)    Start  physical therapy    Follow-up after MRI discuss options    Dr. Neff March 2025    Bring original prescription medication bottles/container/box with labels to each visit

## 2025-07-13 DIAGNOSIS — E11.9 TYPE 2 DIABETES MELLITUS WITHOUT COMPLICATION, WITH LONG-TERM CURRENT USE OF INSULIN: ICD-10-CM

## 2025-07-13 DIAGNOSIS — Z79.4 TYPE 2 DIABETES MELLITUS WITHOUT COMPLICATION, WITH LONG-TERM CURRENT USE OF INSULIN: ICD-10-CM

## 2025-07-14 RX ORDER — METFORMIN HYDROCHLORIDE 1000 MG/1
1000 TABLET ORAL 2 TIMES DAILY
Qty: 180 TABLET | Refills: 1 | Status: SHIPPED | OUTPATIENT
Start: 2025-07-14

## 2025-07-18 DIAGNOSIS — M54.16 LUMBAR RADICULOPATHY: Chronic | ICD-10-CM

## 2025-07-18 DIAGNOSIS — M46.1 SACROILIITIS: Chronic | ICD-10-CM

## 2025-07-18 RX ORDER — GABAPENTIN 300 MG/1
CAPSULE ORAL
Qty: 180 CAPSULE | Refills: 1 | Status: SHIPPED | OUTPATIENT
Start: 2025-07-18

## 2025-08-04 ENCOUNTER — PATIENT MESSAGE (OUTPATIENT)
Dept: PAIN MEDICINE | Facility: CLINIC | Age: 60
End: 2025-08-04
Payer: COMMERCIAL

## 2025-08-04 ENCOUNTER — PATIENT MESSAGE (OUTPATIENT)
Dept: FAMILY MEDICINE | Facility: CLINIC | Age: 60
End: 2025-08-04
Payer: COMMERCIAL

## 2025-08-04 DIAGNOSIS — M54.16 LUMBAR RADICULOPATHY: Chronic | ICD-10-CM

## 2025-08-04 DIAGNOSIS — F33.9 RECURRENT MAJOR DEPRESSIVE DISORDER, REMISSION STATUS UNSPECIFIED: ICD-10-CM

## 2025-08-04 DIAGNOSIS — E11.9 TYPE 2 DIABETES MELLITUS WITHOUT COMPLICATION, WITH LONG-TERM CURRENT USE OF INSULIN: ICD-10-CM

## 2025-08-04 DIAGNOSIS — E78.2 MIXED HYPERLIPIDEMIA: ICD-10-CM

## 2025-08-04 DIAGNOSIS — M46.1 SACROILIITIS: Chronic | ICD-10-CM

## 2025-08-04 DIAGNOSIS — K21.9 GASTROESOPHAGEAL REFLUX DISEASE, UNSPECIFIED WHETHER ESOPHAGITIS PRESENT: ICD-10-CM

## 2025-08-04 DIAGNOSIS — Z79.4 TYPE 2 DIABETES MELLITUS WITHOUT COMPLICATION, WITH LONG-TERM CURRENT USE OF INSULIN: ICD-10-CM

## 2025-08-04 DIAGNOSIS — E03.9 ACQUIRED HYPOTHYROIDISM: Chronic | ICD-10-CM

## 2025-08-04 RX ORDER — SIMVASTATIN 10 MG/1
10 TABLET, FILM COATED ORAL NIGHTLY
Qty: 90 TABLET | Refills: 1 | Status: SHIPPED | OUTPATIENT
Start: 2025-08-04 | End: 2026-08-04

## 2025-08-04 RX ORDER — OMEPRAZOLE 40 MG/1
40 CAPSULE, DELAYED RELEASE ORAL DAILY
Qty: 90 CAPSULE | Refills: 1 | Status: SHIPPED | OUTPATIENT
Start: 2025-08-04 | End: 2026-01-31

## 2025-08-04 RX ORDER — LEVOTHYROXINE SODIUM 100 UG/1
100 TABLET ORAL
Qty: 90 TABLET | Refills: 1 | Status: SHIPPED | OUTPATIENT
Start: 2025-08-04

## 2025-08-04 RX ORDER — CITALOPRAM 40 MG/1
40 TABLET ORAL DAILY
Qty: 90 TABLET | Refills: 1 | Status: SHIPPED | OUTPATIENT
Start: 2025-08-04

## 2025-08-04 RX ORDER — CELECOXIB 200 MG/1
200 CAPSULE ORAL DAILY
Qty: 90 CAPSULE | Refills: 1 | Status: SHIPPED | OUTPATIENT
Start: 2025-08-04

## 2025-08-04 RX ORDER — METFORMIN HYDROCHLORIDE 1000 MG/1
1000 TABLET ORAL 2 TIMES DAILY
Qty: 180 TABLET | Refills: 1 | Status: SHIPPED | OUTPATIENT
Start: 2025-08-04

## 2025-08-04 RX ORDER — SEMAGLUTIDE 0.68 MG/ML
0.5 INJECTION, SOLUTION SUBCUTANEOUS
Qty: 9 ML | Refills: 1 | Status: SHIPPED | OUTPATIENT
Start: 2025-08-04

## 2025-08-04 RX ORDER — GABAPENTIN 300 MG/1
CAPSULE ORAL
Qty: 540 CAPSULE | Refills: 1 | Status: SHIPPED | OUTPATIENT
Start: 2025-08-04

## 2025-08-04 NOTE — TELEPHONE ENCOUNTER
Patient has changed insurance companies. She needs all meds to be sent in to [facility] in [facility].

## 2025-08-18 ENCOUNTER — PATIENT MESSAGE (OUTPATIENT)
Dept: PAIN MEDICINE | Facility: CLINIC | Age: 60
End: 2025-08-18
Payer: COMMERCIAL

## (undated) DEVICE — SOL CONTINU-FLO SET 2 LAV

## (undated) DEVICE — CATH IV 22G X 1 AUTOGUARD

## (undated) DEVICE — DRAPE THREE-QUARTER 53X77IN

## (undated) DEVICE — GLOVE PROTEXIS PI SYN SURG 6.5

## (undated) DEVICE — SLIPPER FALL PREV YELLOW XLG

## (undated) DEVICE — TRAY NERVE BLOCK UNIV 10/CA

## (undated) DEVICE — APPLICATOR CHLORAPREP ORN 26ML

## (undated) DEVICE — KIT COOLED RF 100MM DBL PROBE

## (undated) DEVICE — Device

## (undated) DEVICE — ELECTRODE REM PLYHSV RETURN 9

## (undated) DEVICE — KIT IV START RUSH